# Patient Record
Sex: FEMALE | Race: BLACK OR AFRICAN AMERICAN | NOT HISPANIC OR LATINO | Employment: FULL TIME | URBAN - METROPOLITAN AREA
[De-identification: names, ages, dates, MRNs, and addresses within clinical notes are randomized per-mention and may not be internally consistent; named-entity substitution may affect disease eponyms.]

---

## 2018-12-27 ENCOUNTER — HOSPITAL ENCOUNTER (EMERGENCY)
Facility: CLINIC | Age: 28
Discharge: HOME | End: 2018-12-28
Attending: EMERGENCY MEDICINE | Admitting: EMERGENCY MEDICINE

## 2018-12-27 DIAGNOSIS — R06.02 SHORTNESS OF BREATH: ICD-10-CM

## 2018-12-27 DIAGNOSIS — Z3A.29 29 WEEKS GESTATION OF PREGNANCY: ICD-10-CM

## 2018-12-27 LAB
ALBUMIN SERPL-MCNC: 2.9 G/DL (ref 3.4–5)
ALBUMIN UR-MCNC: NEGATIVE MG/DL
ALP SERPL-CCNC: 116 U/L (ref 40–150)
ALT SERPL W P-5'-P-CCNC: 14 U/L (ref 0–50)
ANION GAP SERPL CALCULATED.3IONS-SCNC: 9 MMOL/L (ref 3–14)
APPEARANCE UR: CLEAR
AST SERPL W P-5'-P-CCNC: 18 U/L (ref 0–45)
BASOPHILS # BLD AUTO: 0 10E9/L (ref 0–0.2)
BASOPHILS NFR BLD AUTO: 0.2 %
BILIRUB SERPL-MCNC: 0.2 MG/DL (ref 0.2–1.3)
BILIRUB UR QL STRIP: NEGATIVE
BUN SERPL-MCNC: 7 MG/DL (ref 7–30)
CALCIUM SERPL-MCNC: 9.2 MG/DL (ref 8.5–10.1)
CHLORIDE SERPL-SCNC: 106 MMOL/L (ref 94–109)
CO2 SERPL-SCNC: 24 MMOL/L (ref 20–32)
COLOR UR AUTO: NORMAL
CREAT SERPL-MCNC: 0.46 MG/DL (ref 0.52–1.04)
D DIMER PPP FEU-MCNC: 1.7 UG/ML FEU (ref 0–0.5)
DIFFERENTIAL METHOD BLD: ABNORMAL
EOSINOPHIL # BLD AUTO: 0.2 10E9/L (ref 0–0.7)
EOSINOPHIL NFR BLD AUTO: 2.4 %
ERYTHROCYTE [DISTWIDTH] IN BLOOD BY AUTOMATED COUNT: 12.5 % (ref 10–15)
GFR SERPL CREATININE-BSD FRML MDRD: >90 ML/MIN/{1.73_M2}
GLUCOSE SERPL-MCNC: 84 MG/DL (ref 70–99)
GLUCOSE UR STRIP-MCNC: NEGATIVE MG/DL
HCT VFR BLD AUTO: 31.1 % (ref 35–47)
HGB BLD-MCNC: 10.3 G/DL (ref 11.7–15.7)
HGB UR QL STRIP: NEGATIVE
IMM GRANULOCYTES # BLD: 0 10E9/L (ref 0–0.4)
IMM GRANULOCYTES NFR BLD: 0.3 %
KETONES UR STRIP-MCNC: NEGATIVE MG/DL
LEUKOCYTE ESTERASE UR QL STRIP: NEGATIVE
LYMPHOCYTES # BLD AUTO: 2.1 10E9/L (ref 0.8–5.3)
LYMPHOCYTES NFR BLD AUTO: 31 %
MCH RBC QN AUTO: 30.4 PG (ref 26.5–33)
MCHC RBC AUTO-ENTMCNC: 33.1 G/DL (ref 31.5–36.5)
MCV RBC AUTO: 92 FL (ref 78–100)
MONOCYTES # BLD AUTO: 0.7 10E9/L (ref 0–1.3)
MONOCYTES NFR BLD AUTO: 10.4 %
NEUTROPHILS # BLD AUTO: 3.7 10E9/L (ref 1.6–8.3)
NEUTROPHILS NFR BLD AUTO: 55.7 %
NITRATE UR QL: NEGATIVE
NRBC # BLD AUTO: 0 10*3/UL
NRBC BLD AUTO-RTO: 0 /100
PH UR STRIP: 6.5 PH (ref 5–7)
PLATELET # BLD AUTO: 267 10E9/L (ref 150–450)
POTASSIUM SERPL-SCNC: 3.6 MMOL/L (ref 3.4–5.3)
PROT SERPL-MCNC: 6.9 G/DL (ref 6.8–8.8)
RBC # BLD AUTO: 3.39 10E12/L (ref 3.8–5.2)
SODIUM SERPL-SCNC: 139 MMOL/L (ref 133–144)
SOURCE: NORMAL
SP GR UR STRIP: 1 (ref 1–1.03)
UROBILINOGEN UR STRIP-MCNC: NORMAL MG/DL (ref 0–2)
WBC # BLD AUTO: 6.7 10E9/L (ref 4–11)

## 2018-12-27 PROCEDURE — 96360 HYDRATION IV INFUSION INIT: CPT

## 2018-12-27 PROCEDURE — 93005 ELECTROCARDIOGRAM TRACING: CPT

## 2018-12-27 PROCEDURE — 85379 FIBRIN DEGRADATION QUANT: CPT | Performed by: EMERGENCY MEDICINE

## 2018-12-27 PROCEDURE — 80053 COMPREHEN METABOLIC PANEL: CPT | Performed by: EMERGENCY MEDICINE

## 2018-12-27 PROCEDURE — 85025 COMPLETE CBC W/AUTO DIFF WBC: CPT | Performed by: EMERGENCY MEDICINE

## 2018-12-27 PROCEDURE — 99285 EMERGENCY DEPT VISIT HI MDM: CPT | Mod: 25

## 2018-12-27 PROCEDURE — 81003 URINALYSIS AUTO W/O SCOPE: CPT | Performed by: EMERGENCY MEDICINE

## 2018-12-27 PROCEDURE — 99285 EMERGENCY DEPT VISIT HI MDM: CPT | Mod: Z6 | Performed by: EMERGENCY MEDICINE

## 2018-12-27 NOTE — ED AVS SNAPSHOT
[facility], Emergency Department  [address]  [facility] [address]  Phone:  [phone]  Fax:  [phone]                                    Jenae Renee   MRN: [id number]    Department:  [facility], Emergency Department   Date of Visit:  12/27/2018           After Visit Summary Signature Page    I have received my discharge instructions, and my questions have been answered. I have discussed any challenges I see with this plan with the nurse or doctor.    ..........................................................................................................................................  Patient/Patient Representative Signature      ..........................................................................................................................................  Patient Representative Print Name and Relationship to Patient    ..................................................               ................................................  Date                                   Time    ..........................................................................................................................................  Reviewed by Signature/Title    ...................................................              ..............................................  Date                                               Time          22EPIC Rev 08/18

## 2018-12-28 ENCOUNTER — APPOINTMENT (OUTPATIENT)
Dept: CT IMAGING | Facility: CLINIC | Age: 28
End: 2018-12-28
Attending: EMERGENCY MEDICINE

## 2018-12-28 VITALS
BODY MASS INDEX: 26.73 KG/M2 | TEMPERATURE: 96.5 F | RESPIRATION RATE: 18 BRPM | OXYGEN SATURATION: 100 % | SYSTOLIC BLOOD PRESSURE: 110 MMHG | WEIGHT: 181 LBS | DIASTOLIC BLOOD PRESSURE: 65 MMHG | HEART RATE: 76 BPM

## 2018-12-28 LAB — INTERPRETATION ECG - MUSE: NORMAL

## 2018-12-28 PROCEDURE — 71260 CT THORAX DX C+: CPT

## 2018-12-28 PROCEDURE — 25000125 ZZHC RX 250: Performed by: EMERGENCY MEDICINE

## 2018-12-28 PROCEDURE — 25000128 H RX IP 250 OP 636: Performed by: EMERGENCY MEDICINE

## 2018-12-28 RX ORDER — IOPAMIDOL 755 MG/ML
100 INJECTION, SOLUTION INTRAVASCULAR ONCE
Status: COMPLETED | OUTPATIENT
Start: 2018-12-28 | End: 2018-12-28

## 2018-12-28 RX ADMIN — IOPAMIDOL 64 ML: 755 INJECTION, SOLUTION INTRAVENOUS at 01:03

## 2018-12-28 RX ADMIN — SODIUM CHLORIDE 1000 ML: 9 INJECTION, SOLUTION INTRAVENOUS at 00:24

## 2018-12-28 RX ADMIN — SODIUM CHLORIDE 84 ML: 9 INJECTION, SOLUTION INTRAVENOUS at 01:02

## 2018-12-28 NOTE — ED PROVIDER NOTES
[facility] [place] ([facility])     2018    History     Chief Complaint   Patient presents with     Shortness of Breath     reports was a passenger in a car and started having intermittent shortness of breath 30 minutes ago, 28 weeks pregnant     HPI  Jenae Renee is a 28 year old female  who is approximately 29w1d  pregnant (reported LMP of 18)  The patient states that over the last week, she has been having some intermittent episodes of shortness of breath associated with chest pressure anteriorly.  Patient states these episodes have been lasting approximately 20 minutes and then resolve on their own.  Patient states she did not think much of it because she thought it was just part of being pregnant.  The patient states she has had no vaginal bleeding, no change in fetal activity, and no significant lower extremity swelling, but states that she recently traveled via airplane to [facility].  Patient states that she went to [facility], 2 months ago, and just returned in the last week on a long flight.  The patient denies any fevers but states that she has developed a cough over the past few days as well.  Patient denies any shortness of breath at rest.    This part of the medical record was transcribed by Holland Otoole Scribe, from a dictation done by Kg Dow MD.     PAST MEDICAL HISTORY  Past Medical History:   Diagnosis Date     Ulcer, gastric, acute     Duodenal ulcer.     PAST SURGICAL HISTORY  History reviewed. No pertinent surgical history.     FAMILY HISTORY  History reviewed. No pertinent family history.     SOCIAL HISTORY  Social History     Tobacco Use     Smoking status: Never Smoker     Smokeless tobacco: Never Used   Substance Use Topics     Alcohol use: No     MEDICATIONS    Dose / Directions   PRENATAL VITAMIN PO      Dose:  1 tablet  Take 1 tablet by mouth daily  Refills:  0              ALLERGIES  Allergies   Allergen Reactions     Nsaids  Other (See Comments)     States she had a duodenal ulcer and her MD told her never to take NSAIDS.         I have reviewed the Medications, Allergies, Past Medical and Surgical History, and Social History in the Epic system.    Review of Systems   All other systems reviewed and are negative.      Physical Exam   BP: 111/67  Pulse: 74  Temp: 96  F (35.6  C)  Resp: 16  Weight: 82.1 kg (181 lb)  SpO2: 99 %      Physical Exam   Constitutional: She is oriented to person, place, and time. She appears well-nourished.   Alert and conversant   HENT:   Head: Atraumatic.   Eyes: EOM are normal. Pupils are equal, round, and reactive to light.   Neck: Neck supple. No JVD present.   Cardiovascular: Regular rhythm.   No murmur heard.  Pulmonary/Chest: Breath sounds normal. She has no wheezes. She has no rales.   Abdominal: Soft.   Fundal height consistent with 30 weeks    Fetal heart tones 150 by nursing personnel   Musculoskeletal: She exhibits no tenderness or deformity.   Trace edema bilaterally   Neurological: She is alert and oriented to person, place, and time.   Grossly intact and symmetric    No clonus   Skin: Skin is warm.   Psychiatric: She has a normal mood and affect.   Nursing note and vitals reviewed.      ED Course        Procedures          Patient's EKG revealed a normal sinus rhythm at a rate of 70 with a NY interval 0.158 and a QRS duration 0.086.  The patient had a normal axis with no acute ST or T wave changes significant for ischemia.    Results for orders placed or performed during the hospital encounter of 12/27/18   CBC with platelets differential   Result Value Ref Range    WBC 6.7 4.0 - 11.0 10e9/L    RBC Count 3.39 (L) 3.8 - 5.2 10e12/L    Hemoglobin 10.3 (L) 11.7 - 15.7 g/dL    Hematocrit 31.1 (L) 35.0 - 47.0 %    MCV 92 78 - 100 fl    MCH 30.4 26.5 - 33.0 pg    MCHC 33.1 31.5 - 36.5 g/dL    RDW 12.5 10.0 - 15.0 %    Platelet Count 267 150 - 450 10e9/L    Diff Method Automated Method     % Neutrophils  55.7 %    % Lymphocytes 31.0 %    % Monocytes 10.4 %    % Eosinophils 2.4 %    % Basophils 0.2 %    % Immature Granulocytes 0.3 %    Nucleated RBCs 0 0 /100    Absolute Neutrophil 3.7 1.6 - 8.3 10e9/L    Absolute Lymphocytes 2.1 0.8 - 5.3 10e9/L    Absolute Monocytes 0.7 0.0 - 1.3 10e9/L    Absolute Eosinophils 0.2 0.0 - 0.7 10e9/L    Absolute Basophils 0.0 0.0 - 0.2 10e9/L    Abs Immature Granulocytes 0.0 0 - 0.4 10e9/L    Absolute Nucleated RBC 0.0    Comprehensive metabolic panel   Result Value Ref Range    Sodium 139 133 - 144 mmol/L    Potassium 3.6 3.4 - 5.3 mmol/L    Chloride 106 94 - 109 mmol/L    Carbon Dioxide 24 20 - 32 mmol/L    Anion Gap 9 3 - 14 mmol/L    Glucose 84 70 - 99 mg/dL    Urea Nitrogen 7 7 - 30 mg/dL    Creatinine 0.46 (L) 0.52 - 1.04 mg/dL    GFR Estimate >90 >60 mL/min/[1.73_m2]    GFR Estimate If Black >90 >60 mL/min/[1.73_m2]    Calcium 9.2 8.5 - 10.1 mg/dL    Bilirubin Total 0.2 0.2 - 1.3 mg/dL    Albumin 2.9 (L) 3.4 - 5.0 g/dL    Protein Total 6.9 6.8 - 8.8 g/dL    Alkaline Phosphatase 116 40 - 150 U/L    ALT 14 0 - 50 U/L    AST 18 0 - 45 U/L   D dimer quantitative   Result Value Ref Range    D Dimer 1.7 (H) 0.0 - 0.50 ug/ml FEU   UA reflex to Microscopic and Culture   Result Value Ref Range    Color Urine Straw     Appearance Urine Clear     Glucose Urine Negative NEG^Negative mg/dL    Bilirubin Urine Negative NEG^Negative    Ketones Urine Negative NEG^Negative mg/dL    Specific Gravity Urine 1.004 1.003 - 1.035    Blood Urine Negative NEG^Negative    pH Urine 6.5 5.0 - 7.0 pH    Protein Albumin Urine Negative NEG^Negative mg/dL    Urobilinogen mg/dL Normal 0.0 - 2.0 mg/dL    Nitrite Urine Negative NEG^Negative    Leukocyte Esterase Urine Negative NEG^Negative    Source Midstream Urine        Labs Ordered and Resulted from Time of ED Arrival Up to the Time of Departure from the ED   CBC WITH PLATELETS DIFFERENTIAL - Abnormal; Notable for the following components:       Result Value     RBC Count 3.39 (*)     Hemoglobin 10.3 (*)     Hematocrit 31.1 (*)     All other components within normal limits   COMPREHENSIVE METABOLIC PANEL - Abnormal; Notable for the following components:    Creatinine 0.46 (*)     Albumin 2.9 (*)     All other components within normal limits   D DIMER QUANTITATIVE - Abnormal; Notable for the following components:    D Dimer 1.7 (*)     All other components within normal limits   UA MACROSCOPIC WITH REFLEX TO MICRO AND CULTURE   PULSE OXIMETRY NURSING   CARDIAC CONTINUOUS MONITORING   PERIPHERAL IV CATHETER   FETAL HEART RATE            Assessments & Plan (with Medical Decision Making)     I have reviewed the nursing notes.    While a positive d-dimer in a pregnant female is not necessarily indicative of pulmonary embolus, the d-dimer was not negative.  Combined with the patient's risk factors of pregnancy and a long with flight recently, patient will undergo chest CT pulmonary angiogram.  The risk benefit analysis was discussed with the patient.  Should this be negative the most likely etiology is esophageal reflux and the patient will be treated for this.  The case will be signed out one my partners will check the results of the CT and if negative the patient will be sent home with the instructions below.  If the chest CT is positive for any significant pathology, patient's disposition/treatment will be changed appropriately.    I have reviewed the findings, diagnosis, plan and need for follow up with the patient.       Medication List      Started    ranitidine 150 MG tablet  Commonly known as:  ZANTAC  150 mg, Oral, 2 TIMES DAILY            Final diagnoses:   Shortness of breath - possible GERD     Start Zantac and try to sleep with your head elevated 30 degrees.    Continue your prenatal vitamins    Eat smaller but more frequent meals.    Please make an appointment to follow up with Your Primary Care Provider or our [facility] (phone:  [phone]) in one week for recheck.    Routine discharge instructions were given for this diagnosis.    Kg Dow MD    12/27/2018   [facility], [place], EMERGENCY DEPARTMENT     Kg Dow MD  12/28/18 0108

## 2018-12-28 NOTE — DISCHARGE INSTRUCTIONS
Start Zantac and try to sleep with your head elevated 30 degrees.    Continue your prenatal vitamins    Eat smaller but more frequent meals.    Please make an appointment to follow up with Your Primary Care Provider or our [facility] (phone: [phone]) in one week for recheck.

## 2020-08-04 ENCOUNTER — HOSPITAL ENCOUNTER (EMERGENCY)
Facility: CLINIC | Age: 30
Discharge: HOME | End: 2020-08-05
Attending: EMERGENCY MEDICINE | Admitting: EMERGENCY MEDICINE
Payer: COMMERCIAL

## 2020-08-04 DIAGNOSIS — O21.9 NAUSEA AND VOMITING IN PREGNANCY: ICD-10-CM

## 2020-08-04 LAB
BASOPHILS # BLD AUTO: 0 10E9/L (ref 0–0.2)
BASOPHILS NFR BLD AUTO: 0.3 %
DIFFERENTIAL METHOD BLD: NORMAL
EOSINOPHIL # BLD AUTO: 0.1 10E9/L (ref 0–0.7)
EOSINOPHIL NFR BLD AUTO: 1.7 %
ERYTHROCYTE [DISTWIDTH] IN BLOOD BY AUTOMATED COUNT: 12.2 % (ref 10–15)
HCT VFR BLD AUTO: 35.8 % (ref 35–47)
HGB BLD-MCNC: 11.8 G/DL (ref 11.7–15.7)
IMM GRANULOCYTES # BLD: 0 10E9/L (ref 0–0.4)
IMM GRANULOCYTES NFR BLD: 0.3 %
LYMPHOCYTES # BLD AUTO: 2.8 10E9/L (ref 0.8–5.3)
LYMPHOCYTES NFR BLD AUTO: 41.8 %
MCH RBC QN AUTO: 30.5 PG (ref 26.5–33)
MCHC RBC AUTO-ENTMCNC: 33 G/DL (ref 31.5–36.5)
MCV RBC AUTO: 93 FL (ref 78–100)
MONOCYTES # BLD AUTO: 0.7 10E9/L (ref 0–1.3)
MONOCYTES NFR BLD AUTO: 10.4 %
NEUTROPHILS # BLD AUTO: 3 10E9/L (ref 1.6–8.3)
NEUTROPHILS NFR BLD AUTO: 45.5 %
NRBC # BLD AUTO: 0 10*3/UL
NRBC BLD AUTO-RTO: 0 /100
PLATELET # BLD AUTO: 271 10E9/L (ref 150–450)
RBC # BLD AUTO: 3.87 10E12/L (ref 3.8–5.2)
WBC # BLD AUTO: 6.6 10E9/L (ref 4–11)

## 2020-08-04 PROCEDURE — 99284 EMERGENCY DEPT VISIT MOD MDM: CPT | Mod: 25 | Performed by: EMERGENCY MEDICINE

## 2020-08-04 PROCEDURE — 25800030 ZZH RX IP 258 OP 636

## 2020-08-04 PROCEDURE — 81025 URINE PREGNANCY TEST: CPT | Performed by: EMERGENCY MEDICINE

## 2020-08-04 PROCEDURE — 80048 BASIC METABOLIC PNL TOTAL CA: CPT | Performed by: FAMILY MEDICINE

## 2020-08-04 PROCEDURE — 96374 THER/PROPH/DIAG INJ IV PUSH: CPT | Performed by: EMERGENCY MEDICINE

## 2020-08-04 PROCEDURE — 96361 HYDRATE IV INFUSION ADD-ON: CPT | Performed by: EMERGENCY MEDICINE

## 2020-08-04 PROCEDURE — 99284 EMERGENCY DEPT VISIT MOD MDM: CPT | Mod: Z6 | Performed by: EMERGENCY MEDICINE

## 2020-08-04 PROCEDURE — 25000128 H RX IP 250 OP 636: Performed by: FAMILY MEDICINE

## 2020-08-04 PROCEDURE — 83735 ASSAY OF MAGNESIUM: CPT | Performed by: FAMILY MEDICINE

## 2020-08-04 PROCEDURE — 84702 CHORIONIC GONADOTROPIN TEST: CPT | Performed by: FAMILY MEDICINE

## 2020-08-04 PROCEDURE — 85025 COMPLETE CBC W/AUTO DIFF WBC: CPT | Performed by: FAMILY MEDICINE

## 2020-08-04 RX ORDER — SODIUM CHLORIDE 9 MG/ML
INJECTION, SOLUTION INTRAVENOUS
Status: COMPLETED
Start: 2020-08-04 | End: 2020-08-05

## 2020-08-04 RX ORDER — ONDANSETRON 2 MG/ML
4 INJECTION INTRAMUSCULAR; INTRAVENOUS
Status: COMPLETED | OUTPATIENT
Start: 2020-08-04 | End: 2020-08-04

## 2020-08-04 RX ADMIN — SODIUM CHLORIDE 1000 ML: 9 INJECTION, SOLUTION INTRAVENOUS at 23:14

## 2020-08-04 RX ADMIN — Medication 1000 ML: at 23:14

## 2020-08-04 RX ADMIN — ONDANSETRON 4 MG: 2 INJECTION INTRAMUSCULAR; INTRAVENOUS at 23:14

## 2020-08-04 ASSESSMENT — ENCOUNTER SYMPTOMS
DYSURIA: 0
DIARRHEA: 0
NAUSEA: 1
FEVER: 0
ABDOMINAL PAIN: 1
VOMITING: 1
CHILLS: 0
SHORTNESS OF BREATH: 0
SORE THROAT: 0
FREQUENCY: 0
COUGH: 0

## 2020-08-04 NOTE — ED AVS SNAPSHOT
[facility], [place], Emergency Department  [address]  [facility] [address]  Phone:  [phone]  Fax:  [phone]                                    Jenae Renee   MRN: [id number]    Department:  [facility], Emergency Department   Date of Visit:  8/4/2020           After Visit Summary Signature Page    I have received my discharge instructions, and my questions have been answered. I have discussed any challenges I see with this plan with the nurse or doctor.    ..........................................................................................................................................  Patient/Patient Representative Signature      ..........................................................................................................................................  Patient Representative Print Name and Relationship to Patient    ..................................................               ................................................  Date                                   Time    ..........................................................................................................................................  Reviewed by Signature/Title    ...................................................              ..............................................  Date                                               Time          22EPIC Rev 08/18

## 2020-08-05 ENCOUNTER — TELEPHONE (OUTPATIENT)
Dept: OBGYN | Facility: CLINIC | Age: 30
End: 2020-08-05

## 2020-08-05 VITALS
BODY MASS INDEX: 29.11 KG/M2 | HEART RATE: 80 BPM | TEMPERATURE: 98.5 F | RESPIRATION RATE: 16 BRPM | OXYGEN SATURATION: 100 % | SYSTOLIC BLOOD PRESSURE: 114 MMHG | WEIGHT: 197.1 LBS | DIASTOLIC BLOOD PRESSURE: 61 MMHG

## 2020-08-05 DIAGNOSIS — O20.0 THREATENED ABORTION: Primary | ICD-10-CM

## 2020-08-05 LAB
ALBUMIN UR-MCNC: 30 MG/DL
ANION GAP SERPL CALCULATED.3IONS-SCNC: 4 MMOL/L (ref 3–14)
APPEARANCE UR: CLEAR
B-HCG SERPL-ACNC: ABNORMAL IU/L (ref 0–5)
BILIRUB UR QL STRIP: NEGATIVE
BUN SERPL-MCNC: 9 MG/DL (ref 7–30)
CALCIUM SERPL-MCNC: 9.3 MG/DL (ref 8.5–10.1)
CHLORIDE SERPL-SCNC: 110 MMOL/L (ref 94–109)
CO2 SERPL-SCNC: 24 MMOL/L (ref 20–32)
COLOR UR AUTO: YELLOW
CREAT SERPL-MCNC: 0.48 MG/DL (ref 0.52–1.04)
GFR SERPL CREATININE-BSD FRML MDRD: >90 ML/MIN/{1.73_M2}
GLUCOSE SERPL-MCNC: 82 MG/DL (ref 70–99)
GLUCOSE UR STRIP-MCNC: NEGATIVE MG/DL
HCG UR QL: POSITIVE
HGB UR QL STRIP: NEGATIVE
INTERNAL QC OK POCT: YES
KETONES UR STRIP-MCNC: 5 MG/DL
LEUKOCYTE ESTERASE UR QL STRIP: NEGATIVE
MAGNESIUM SERPL-MCNC: 2.4 MG/DL (ref 1.6–2.3)
MUCOUS THREADS #/AREA URNS LPF: PRESENT /LPF
NITRATE UR QL: NEGATIVE
PH UR STRIP: 6 PH (ref 5–7)
POTASSIUM SERPL-SCNC: 4 MMOL/L (ref 3.4–5.3)
RBC #/AREA URNS AUTO: 0 /HPF (ref 0–2)
SODIUM SERPL-SCNC: 138 MMOL/L (ref 133–144)
SOURCE: ABNORMAL
SP GR UR STRIP: 1.03 (ref 1–1.03)
SQUAMOUS #/AREA URNS AUTO: 2 /HPF (ref 0–1)
UROBILINOGEN UR STRIP-MCNC: 2 MG/DL (ref 0–2)
WBC #/AREA URNS AUTO: 3 /HPF (ref 0–5)

## 2020-08-05 PROCEDURE — 81001 URINALYSIS AUTO W/SCOPE: CPT | Performed by: EMERGENCY MEDICINE

## 2020-08-05 RX ORDER — PYRIDOXINE HCL (VITAMIN B6) 25 MG
25 TABLET ORAL EVERY 8 HOURS PRN
Qty: 30 TABLET | Refills: 0 | Status: SHIPPED | OUTPATIENT
Start: 2020-08-05 | End: 2020-09-24

## 2020-08-05 NOTE — DISCHARGE INSTRUCTIONS
You have been seen in the emergency department today for nausea and vomiting in pregnancy.  This is very common in early pregnancy.  We have given you prescriptions for pyridoxine and doxylamine which are the first-line treatment for nausea and vomiting in pregnancy.  You are not significantly dehydrated according to your lab test.  We have offered to do a pelvic exam which you have declined.  We have offered to do an ultrasound which you have declined.    At this point we advised that you call your clinic tomorrow and make an appointment to be evaluated through your clinic.  Certainly if you are noticing abdominal pain, increased bleeding, or other new concerns, come back to the emergency department at which point we can reevaluate you.

## 2020-08-05 NOTE — ED NOTES
Writer attempted to take patient to ultrasound and writer informed patient that it would be a male technician doing the ultrasound. Patient then refused due to ultrasound technician being male. Patient was informed that there were no other female technicians and patient verbalized understanding that she would be getting discharged if refusal of ultrasound.

## 2020-08-05 NOTE — TELEPHONE ENCOUNTER
Received call from Jenae who was trying to make appointment to establish prenatal care with us but mentioned she is having pink discharge so she was forwarded to the nurse.    Jenae reports she had a miscarriage on 6/18 that resolved with bleeding. Per [facility], she had bhcg 6/26 that was <1. She did not have a menses after that.  She went to ED yesterday for nausea/vomiting in pregnancy but declined ultrasound due to not having a female ultrasound tech available.  Denies pain.  Her bchg was 54,000+.  Rh+.    Discussed with Dr. Amezquita who advised ultrasound now. No need for second bhcg. Will develop plan of care when know what ultrasound shows (normal vs miscarriage vs molar).  Gave this information to Jenae who agreed with plan and was scheduled for tomorrow morning.    If ultrasound normal, will need to be scheduled for regular prenatal care.

## 2020-08-05 NOTE — ED PROVIDER NOTES
"    [facility] EMERGENCY DEPARTMENT ([facility])     2020    History     Chief Complaint   Patient presents with     Nausea & Vomiting     LMP , 2 weeks ago found out she is pregnant, for the past week she has been unable to keep anything down     Vaginal Bleeding     has noticed \" pink watery discharge, no blood for the past day\"     The history is provided by the patient and medical records.     Jenae Renee is a 29 year old female who presents to the Emergency Department today complaining of nausea and vomiting.  Patient reports that she had a positive urine pregnancy test approximately 2 weeks ago.  She is estimating that she is approximately 7 weeks pregnant.  Her last menstrual period was on , however that period was a miscarriage of a prior pregnancy.  She had not had a period since that time.  For the past 2 weeks she has had significant nausea and vomiting and for the past week states she has been unable to keep anything down.  She has vomited 2 times today.  She has constant nausea all day long.  She has noticed decrease in urination as well.  She has some upper abdominal pain which feels muscular to her which she believes is due to vomiting.  She has no lower abdominal pain.  She has no cramping.  She noticed some very light pink discharge earlier today which is now gone.  She denies dysuria or urinary frequency.  No fever or chills.  No sore throat or nasal congestion.  No cough or shortness of breath.  No chest pain.  No diarrhea, she does have problems with constipation.  Patient is .    This part of the medical record was transcribed by Holland Leon Scribariel, from a dictation done by Genia Emery MD.     PAST MEDICAL HISTORY:   Past Medical History:   Diagnosis Date     Ulcer, gastric, acute     Duodenal ulcer.       PAST SURGICAL HISTORY: No past surgical history on file.    Past medical history, past surgical history, medications, and allergies were " reviewed with the patient. Additional pertinent items: None    FAMILY HISTORY: No family history on file.    SOCIAL HISTORY:   Social History     Tobacco Use     Smoking status: Never Smoker     Smokeless tobacco: Never Used   Substance Use Topics     Alcohol use: No     Social history was reviewed with the patient. Additional pertinent items: None      Patient's Medications   New Prescriptions    No medications on file   Previous Medications    FOLIC ACID PO    Take 400 mcg by mouth daily    PRENATAL VIT-FE FUMARATE-FA (PRENATAL VITAMIN PO)    Take 1 tablet by mouth daily   Modified Medications    No medications on file   Discontinued Medications    No medications on file          Allergies   Allergen Reactions     Nsaids Other (See Comments)     States she had a duodenal ulcer and her MD told her never to take NSAIDS.        Review of Systems   Constitutional: Negative for chills and fever.   HENT: Negative for congestion and sore throat.    Respiratory: Negative for cough and shortness of breath.    Cardiovascular: Negative for chest pain.   Gastrointestinal: Positive for abdominal pain (upper), nausea and vomiting. Negative for diarrhea.   Genitourinary: Positive for decreased urine volume and vaginal discharge (resolved). Negative for dysuria and frequency.   All other systems reviewed and are negative.    A complete review of systems was performed with pertinent positives and negatives noted in the HPI, and all other systems negative.    Physical Exam   BP: 116/71  Pulse: 69  Heart Rate: 88  Temp: 98  F (36.7  C)  Resp: 16  Weight: 89.4 kg (197 lb 1.6 oz)  SpO2: 100 %      Physical Exam  Vitals signs and nursing note reviewed.   Constitutional:       General: She is not in acute distress.     Appearance: She is well-developed. She is not diaphoretic.      Comments: Alert, cooperative, NAD   HENT:      Head: Normocephalic and atraumatic.   Eyes:      Conjunctiva/sclera: Conjunctivae normal.      Pupils: Pupils  are equal, round, and reactive to light.   Cardiovascular:      Rate and Rhythm: Normal rate and regular rhythm.      Heart sounds: Normal heart sounds.   Pulmonary:      Effort: Pulmonary effort is normal. No respiratory distress.      Breath sounds: Normal breath sounds. No wheezing or rales.   Abdominal:      General: Bowel sounds are normal. There is no distension.      Palpations: Abdomen is soft.      Tenderness: There is no abdominal tenderness.      Comments: Very slight epigastric TTP without guarding or rebound   Genitourinary:     Comments: Patient declined pelvic exam  Skin:     General: Skin is warm and dry.   Neurological:      Mental Status: She is alert and oriented to person, place, and time.          Procedures                           Results for orders placed or performed during the hospital encounter of 08/04/20   HCG quantitative pregnancy     Status: Abnormal   Result Value Ref Range    HCG Quantitative Serum 54,719 (H) 0 - 5 IU/L   UA with Microscopic reflex to Culture     Status: Abnormal    Specimen: Urine clean catch; Midstream Urine   Result Value Ref Range    Color Urine Yellow     Appearance Urine Clear     Glucose Urine Negative NEG^Negative mg/dL    Bilirubin Urine Negative NEG^Negative    Ketones Urine 5 (A) NEG^Negative mg/dL    Specific Gravity Urine 1.030 1.003 - 1.035    Blood Urine Negative NEG^Negative    pH Urine 6.0 5.0 - 7.0 pH    Protein Albumin Urine 30 (A) NEG^Negative mg/dL    Urobilinogen mg/dL 2.0 0.0 - 2.0 mg/dL    Nitrite Urine Negative NEG^Negative    Leukocyte Esterase Urine Negative NEG^Negative    Source Midstream Urine     WBC Urine 3 0 - 5 /HPF    RBC Urine 0 0 - 2 /HPF    Squamous Epithelial /HPF Urine 2 (H) 0 - 1 /HPF    Mucous Urine Present (A) NEG^Negative /LPF   Basic metabolic panel     Status: Abnormal   Result Value Ref Range    Sodium 138 133 - 144 mmol/L    Potassium 4.0 3.4 - 5.3 mmol/L    Chloride 110 (H) 94 - 109 mmol/L    Carbon Dioxide 24 20 -  32 mmol/L    Anion Gap 4 3 - 14 mmol/L    Glucose 82 70 - 99 mg/dL    Urea Nitrogen 9 7 - 30 mg/dL    Creatinine 0.48 (L) 0.52 - 1.04 mg/dL    GFR Estimate >90 >60 mL/min/[1.73_m2]    GFR Estimate If Black >90 >60 mL/min/[1.73_m2]    Calcium 9.3 8.5 - 10.1 mg/dL   Magnesium     Status: Abnormal   Result Value Ref Range    Magnesium 2.4 (H) 1.6 - 2.3 mg/dL   CBC with platelets differential     Status: None   Result Value Ref Range    WBC 6.6 4.0 - 11.0 10e9/L    RBC Count 3.87 3.8 - 5.2 10e12/L    Hemoglobin 11.8 11.7 - 15.7 g/dL    Hematocrit 35.8 35.0 - 47.0 %    MCV 93 78 - 100 fl    MCH 30.5 26.5 - 33.0 pg    MCHC 33.0 31.5 - 36.5 g/dL    RDW 12.2 10.0 - 15.0 %    Platelet Count 271 150 - 450 10e9/L    Diff Method Automated Method     % Neutrophils 45.5 %    % Lymphocytes 41.8 %    % Monocytes 10.4 %    % Eosinophils 1.7 %    % Basophils 0.3 %    % Immature Granulocytes 0.3 %    Nucleated RBCs 0 0 /100    Absolute Neutrophil 3.0 1.6 - 8.3 10e9/L    Absolute Lymphocytes 2.8 0.8 - 5.3 10e9/L    Absolute Monocytes 0.7 0.0 - 1.3 10e9/L    Absolute Eosinophils 0.1 0.0 - 0.7 10e9/L    Absolute Basophils 0.0 0.0 - 0.2 10e9/L    Abs Immature Granulocytes 0.0 0 - 0.4 10e9/L    Absolute Nucleated RBC 0.0    hCG qual urine POCT     Status: Abnormal   Result Value Ref Range    HCG Qual Urine Positive neg    Internal QC OK Yes        Medications   dextrose 5% and 0.45% NaCl 1,000 mL with Infuvite Adult 10 mL, thiamine 100 mg, folic acid 1 mg infusion ( Intravenous Not Given 8/5/20 0016)   ondansetron (ZOFRAN) injection 4 mg (4 mg Intravenous Given 8/4/20 0091)   0.9% sodium chloride BOLUS (0 mLs Intravenous Stopped 8/5/20 0014)             Assessments & Plan (with Medical Decision Making)   Patient presents for the above above complaints.  On my evaluation she is alert, cooperative, no acute distress.  Vital signs are within normal limits.  Abdominal exam shows very minimal epigastric tenderness to palpation.    Likely she is  experiencing nausea and vomiting consistent with early pregnancy.  Rather doubt significant problems such as ectopic pregnancy at this point.  Metabolic derangement needs to be considered given the fact that she has been reporting she is not keeping anything down.  We did establish IV access and we did draw blood for laboratory analysis.  CBC is within normal limits, BMP is within normal limits, Magnesium 2.4.  UA is not suggestive of infection and UPT positive, beta-hCG is 54,719.  Patient was given IV fluids including normal saline here in the emergency department.  She was given a dose of Zofran for antiemetic purposes.  We did order a banana bag which the patient has declined at this point.  Upon reassessment she is feeling improved though she does reports that she started to notice some increasing pink discharge which had stopped and then started here again.  She denies any elgin bleeding and denies any cramping.  I offered to do a pelvic exam which she declined.  I offered to do ultrasound to evaluate for ectopic pregnancy versus subchorionic hemorrhage or impending spontaneous AB.  She initially agreed and then ultimately declined because the ultrasound tech who is on-call tonight is male.  Patient will be discharged at this time and she can certainly follow-up with her clinic.  If she is noticing worsening symptoms she should come back to the emergency department.    This part of the medical record was transcribed by Kassy Jara, Medical Scribe, from a dictation done by Genia Emery MD.     I have reviewed the nursing notes.    I have reviewed the findings, diagnosis, plan and need for follow up with the patient.    New Prescriptions    No medications on file       Final diagnoses:   Nausea and vomiting in pregnancy       8/4/2020   [facility], [place], EMERGENCY DEPARTMENT     Genia Emery MD  08/05/20 0106

## 2020-08-06 ENCOUNTER — TELEPHONE (OUTPATIENT)
Dept: FAMILY MEDICINE | Facility: CLINIC | Age: 30
End: 2020-08-06

## 2020-08-06 ENCOUNTER — ANCILLARY PROCEDURE (OUTPATIENT)
Dept: ULTRASOUND IMAGING | Facility: CLINIC | Age: 30
End: 2020-08-06
Attending: OBSTETRICS & GYNECOLOGY
Payer: COMMERCIAL

## 2020-08-06 DIAGNOSIS — O20.0 THREATENED ABORTION: ICD-10-CM

## 2020-08-06 DIAGNOSIS — O21.9 NAUSEA AND VOMITING IN PREGNANCY: Primary | ICD-10-CM

## 2020-08-06 PROCEDURE — 76817 TRANSVAGINAL US OBSTETRIC: CPT

## 2020-08-06 NOTE — TELEPHONE ENCOUNTER
Reason for call:  Patient reporting a symptom    Symptom or request: vomiting concerns     Duration (how long have symptoms been present): About a week and a half    Have you been treated for this before? No    Additional comments: Patient is wondering what she should do for her vomiting concerns and what she should take. She would like to speak to a nurse to discuss this. Patient went to the women's clinic at the [facility] for a pregnancy confirmation yesterday. I informed patient she would have to call OBGYN and she has already contacted them over at [place] and they told her to call our clinic to establish care. Patient states they just need to discuss her vomiting concerns and that will establish care at a later date. She is wondering what over the counter medications she can take to help the vomiting subside.    Phone Number patient can be reached at:  Cell number on file:    Telephone Information:   Mobile [phone]       Best Time:  As soon as possible     Can we leave a detailed message on this number:  YES    Call taken on 8/6/2020 at 4:33 PM by Christopher Browning

## 2020-08-06 NOTE — TELEPHONE ENCOUNTER
"She is scheduled for prenatal intake and a repeat US on 8/27/20.    Her US today indicates she is about 7 weeks' pregnant.    I see B6 and doxylamine on Epic med list.    I called patient, she says she has never seen anyone here, is transferring care from [facility].   Says she was in the ER 2 days ago and got IV fluids for vomiting.   Says she has tried the B6 and unisom for 3 days but still not keeping anything down.   States she last urinated \"a little bit\" about 6 hours ago, denies feeling dizzy, is feeling a bit weak but states she is not feeling as bad as she was when she was in ER.   She does not want to go to ER again as she has another baby to take care of and hopes she can get something else for her nausea.    She says the zofran given in the ER helped.    Pharmacy selected.   She has yet to establish care with primary care here, is scheduled for OB intake but assume that is RN?    Routed to provider who ordered the recent US to address possible Rx; also routed to RD OB triage in case this needs to go to an alternate provider who may be available aarti.    Izabel Rubio, FILOMENA  [facility]          "

## 2020-08-07 RX ORDER — ONDANSETRON 4 MG/1
4 TABLET, FILM COATED ORAL EVERY 8 HOURS PRN
Qty: 30 TABLET | Refills: 1 | Status: SHIPPED | OUTPATIENT
Start: 2020-08-07 | End: 2020-09-24

## 2020-08-07 NOTE — TELEPHONE ENCOUNTER
Spoke with Jenae and informed her will send zofran to pharmacy - she requested pill and not dissolvable.  Advised that when she is feeling better to hydrate. If feeling dizzy or light headed, go to ED.  Sometime popscicles/ice chips go down easier and can try that for added hydration.  Informed that since ultrasound was normal, no need for repeat and upcoming ultrasound will be cancelled.  She indicated understanding to all and agreed with plan.

## 2020-08-27 ENCOUNTER — OFFICE VISIT (OUTPATIENT)
Dept: OBGYN | Facility: CLINIC | Age: 30
End: 2020-08-27
Attending: MIDWIFE
Payer: COMMERCIAL

## 2020-08-27 VITALS
HEIGHT: 69 IN | BODY MASS INDEX: 27.27 KG/M2 | HEART RATE: 73 BPM | SYSTOLIC BLOOD PRESSURE: 120 MMHG | DIASTOLIC BLOOD PRESSURE: 82 MMHG | WEIGHT: 184.1 LBS

## 2020-08-27 DIAGNOSIS — Z34.80 SUPERVISION OF OTHER NORMAL PREGNANCY: Primary | ICD-10-CM

## 2020-08-27 DIAGNOSIS — R11.2 NAUSEA AND VOMITING, INTRACTABILITY OF VOMITING NOT SPECIFIED, UNSPECIFIED VOMITING TYPE: ICD-10-CM

## 2020-08-27 LAB
ABO + RH BLD: NORMAL
ABO + RH BLD: NORMAL
ALBUMIN SERPL-MCNC: 3.6 G/DL (ref 3.4–5)
ALP SERPL-CCNC: 51 U/L (ref 40–150)
ALT SERPL W P-5'-P-CCNC: 46 U/L (ref 0–50)
ANION GAP SERPL CALCULATED.3IONS-SCNC: 8 MMOL/L (ref 3–14)
AST SERPL W P-5'-P-CCNC: 28 U/L (ref 0–45)
BASOPHILS # BLD AUTO: 0 10E9/L (ref 0–0.2)
BASOPHILS NFR BLD AUTO: 0.4 %
BILIRUB SERPL-MCNC: 0.5 MG/DL (ref 0.2–1.3)
BLD GP AB SCN SERPL QL: NORMAL
BLOOD BANK CMNT PATIENT-IMP: NORMAL
BUN SERPL-MCNC: 8 MG/DL (ref 7–30)
CALCIUM SERPL-MCNC: 9.8 MG/DL (ref 8.5–10.1)
CHLORIDE SERPL-SCNC: 108 MMOL/L (ref 94–109)
CO2 SERPL-SCNC: 23 MMOL/L (ref 20–32)
CREAT SERPL-MCNC: 0.47 MG/DL (ref 0.52–1.04)
DIFFERENTIAL METHOD BLD: NORMAL
EOSINOPHIL # BLD AUTO: 0.1 10E9/L (ref 0–0.7)
EOSINOPHIL NFR BLD AUTO: 1.3 %
ERYTHROCYTE [DISTWIDTH] IN BLOOD BY AUTOMATED COUNT: 12.1 % (ref 10–15)
GFR SERPL CREATININE-BSD FRML MDRD: >90 ML/MIN/{1.73_M2}
GLUCOSE SERPL-MCNC: 87 MG/DL (ref 70–99)
HCT VFR BLD AUTO: 36.8 % (ref 35–47)
HGB BLD-MCNC: 12.4 G/DL (ref 11.7–15.7)
IMM GRANULOCYTES # BLD: 0 10E9/L (ref 0–0.4)
IMM GRANULOCYTES NFR BLD: 0.2 %
LYMPHOCYTES # BLD AUTO: 2 10E9/L (ref 0.8–5.3)
LYMPHOCYTES NFR BLD AUTO: 37.5 %
MCH RBC QN AUTO: 30.5 PG (ref 26.5–33)
MCHC RBC AUTO-ENTMCNC: 33.7 G/DL (ref 31.5–36.5)
MCV RBC AUTO: 90 FL (ref 78–100)
MONOCYTES # BLD AUTO: 0.6 10E9/L (ref 0–1.3)
MONOCYTES NFR BLD AUTO: 10.3 %
NEUTROPHILS # BLD AUTO: 2.7 10E9/L (ref 1.6–8.3)
NEUTROPHILS NFR BLD AUTO: 50.3 %
NRBC # BLD AUTO: 0 10*3/UL
NRBC BLD AUTO-RTO: 0 /100
PLATELET # BLD AUTO: 280 10E9/L (ref 150–450)
POTASSIUM SERPL-SCNC: 3.9 MMOL/L (ref 3.4–5.3)
PROT SERPL-MCNC: 7.9 G/DL (ref 6.8–8.8)
RBC # BLD AUTO: 4.07 10E12/L (ref 3.8–5.2)
SODIUM SERPL-SCNC: 139 MMOL/L (ref 133–144)
SPECIMEN EXP DATE BLD: NORMAL
T4 FREE SERPL-MCNC: 1.34 NG/DL (ref 0.76–1.46)
TSH SERPL DL<=0.005 MIU/L-ACNC: 0.19 MU/L (ref 0.4–4)
WBC # BLD AUTO: 5.3 10E9/L (ref 4–11)

## 2020-08-27 PROCEDURE — 36415 COLL VENOUS BLD VENIPUNCTURE: CPT | Performed by: MIDWIFE

## 2020-08-27 PROCEDURE — 86850 RBC ANTIBODY SCREEN: CPT | Performed by: MIDWIFE

## 2020-08-27 PROCEDURE — 84439 ASSAY OF FREE THYROXINE: CPT | Performed by: MIDWIFE

## 2020-08-27 PROCEDURE — 82306 VITAMIN D 25 HYDROXY: CPT | Performed by: MIDWIFE

## 2020-08-27 PROCEDURE — 80053 COMPREHEN METABOLIC PANEL: CPT | Performed by: MIDWIFE

## 2020-08-27 PROCEDURE — 86762 RUBELLA ANTIBODY: CPT | Performed by: MIDWIFE

## 2020-08-27 PROCEDURE — G0463 HOSPITAL OUTPT CLINIC VISIT: HCPCS

## 2020-08-27 PROCEDURE — 86780 TREPONEMA PALLIDUM: CPT | Performed by: MIDWIFE

## 2020-08-27 PROCEDURE — 87340 HEPATITIS B SURFACE AG IA: CPT | Performed by: MIDWIFE

## 2020-08-27 PROCEDURE — 84443 ASSAY THYROID STIM HORMONE: CPT | Performed by: MIDWIFE

## 2020-08-27 PROCEDURE — 87086 URINE CULTURE/COLONY COUNT: CPT | Performed by: MIDWIFE

## 2020-08-27 PROCEDURE — 86901 BLOOD TYPING SEROLOGIC RH(D): CPT | Performed by: MIDWIFE

## 2020-08-27 PROCEDURE — 85025 COMPLETE CBC W/AUTO DIFF WBC: CPT | Performed by: MIDWIFE

## 2020-08-27 PROCEDURE — 87389 HIV-1 AG W/HIV-1&-2 AB AG IA: CPT | Performed by: MIDWIFE

## 2020-08-27 PROCEDURE — 86900 BLOOD TYPING SEROLOGIC ABO: CPT | Performed by: MIDWIFE

## 2020-08-27 PROCEDURE — 86803 HEPATITIS C AB TEST: CPT | Performed by: MIDWIFE

## 2020-08-27 RX ORDER — DEXTROSE, SODIUM CHLORIDE, SODIUM LACTATE, POTASSIUM CHLORIDE, AND CALCIUM CHLORIDE 5; .6; .31; .03; .02 G/100ML; G/100ML; G/100ML; G/100ML; G/100ML
1000 INJECTION, SOLUTION INTRAVENOUS ONCE
Status: CANCELLED | OUTPATIENT
Start: 2020-08-27

## 2020-08-27 RX ORDER — METOCLOPRAMIDE 10 MG/1
10 TABLET ORAL
Qty: 30 TABLET | Refills: 1 | Status: SHIPPED | OUTPATIENT
Start: 2020-08-27 | End: 2020-09-24

## 2020-08-27 RX ORDER — ONDANSETRON 2 MG/ML
4 INJECTION INTRAMUSCULAR; INTRAVENOUS ONCE
Status: CANCELLED | OUTPATIENT
Start: 2020-08-27

## 2020-08-27 ASSESSMENT — MIFFLIN-ST. JEOR: SCORE: 1614.45

## 2020-08-27 NOTE — PATIENT INSTRUCTIONS
"Discharge Instructions for COVID-19 Patients  You have--or may have--COVID-19. Please follow the instructions listed below.   If you have a weakened immune system, discuss with your doctor any other actions you need to take.  How can I protect others?  If you have symptoms (fever, cough, body aches or trouble breathing):    Stay home and away from others (self-isolate) until:  ? At least 10 days have passed since your symptoms started. And   ? You've had no fever--and no medicine that reduces fever--for 1 full day (24 hours). And   ? Your other symptoms have resolved (gotten better).  If you don't show symptoms, but testing showed that you have COVID-19:    Stay home and away from others (self-isolate) until at least 10 days have passed since the date of your first positive COVID-19 test.  During this time    Stay in your own room, even for meals. Use your own bathroom if you can.    Stay away from others in your home. No hugging, kissing or shaking hands. No visitors.    Don't go to work, school or anywhere else.    Clean \"high touch\" surfaces often (doorknobs, counters, handles). Use household cleaning spray or wipes. You'll find a full list of  on the EPA website: www.epa.gov/pesticide-registration/list-n-disinfectants-use-against-sars-cov-2.    Cover your mouth and nose with a mask or other face covering to avoid spreading germs.    Wash your hands and face often. Use soap and water.    Caregivers in these groups are at risk for severe illness due to COVID-19:  ? People 65 years and older  ? People who live in a nursing home or long-term care facility  ? People with chronic disease (lung, heart, cancer, diabetes, kidney, liver, immunologic)  ? People who have a weakened immune system, including those who:    Are in cancer treatment    Take medicine that weakens the immune system, such as corticosteroids    Had a bone marrow or organ transplant    Have an immune deficiency    Have poorly controlled HIV or " AIDS    Are obese (body mass index of 40 or higher)    Smoke regularly    Caregivers should wear gloves while washing dishes, handling laundry and cleaning bedrooms and bathrooms.    Use caution when washing and drying laundry: Don't shake dirty laundry and use the warmest water setting that you can.    For more tips on managing your health at home, go to www.cdc.gov/coronavirus/2019-ncov/downloads/10Things.pdf.  How can I take care of myself at home?  1. Get lots of rest. Drink extra fluids (unless a doctor has told you not to).  2. Take Tylenol (acetaminophen) for fever or pain. If you have liver or kidney problems, ask your family doctor if it's okay to take Tylenol.     Adults can take either:  ? 650 mg (two 325 mg pills) every 4 to 6 hours, or   ? 1,000 mg (two 500 mg pills) every 8 hours as needed.  ? Note: Don't take more than 3,000 mg in one day. Acetaminophen is found in many medicines (both prescribed and over-the-counter medicines). Read all labels to be sure you don't take too much.   For children, check the Tylenol bottle for the right dose. The dose is based on the child's age or weight.  3. If you have other health problems (like cancer, heart failure, an organ transplant or severe kidney disease): Call your specialty clinic if you don't feel better in the next 2 days.  4. Know when to call 911. Emergency warning signs include:  ? Trouble breathing or shortness of breath  ? Pain or pressure in the chest that doesn't go away  ? Feeling confused like you haven't felt before, or not being able to wake up  ? Bluish-colored lips or face  5. Your doctor may have prescribed a blood thinner medicine. Follow their instructions.  Where can I get more information?     [facility] - About COVID-19: [email or web address]    CDC - What to Do If You're Sick: www.cdc.gov/coronavirus/2019-ncov/about/steps-when-sick.html    CDC - Ending Home Isolation:  www.cdc.gov/coronavirus/2019-ncov/hcp/disposition-in-home-patients.html    CDC - Caring for Someone: www.cdc.gov/coronavirus/2019-ncov/if-you-are-sick/care-for-someone.html    [facility] - Interim Guidance for Hospital Discharge to Home: www.[facility].[place]/diseases/coronavirus/hcp/hospdischarge.pdf    [facility] clinical trials (COVID-19 research studies): clinicalaffairs.[facility]-clinical-trials    Below are the COVID-19 hotlines at the [place] Department of Health ([facility]). Interpreters are available.  ? For health questions: Call 683-052-1667 or 1-670.447.8908 (7 a.m. to 7 p.m.)  ? For questions about schools and childcare: Call 944-896-8372 or 1-952.631.3743 (7 a.m. to 7 p.m.)    For informational purposes only. Not to replace the advice of your health care provider. Clinically reviewed by the Infection Prevention Team. Copyright   2020 [facility]. All rights reserved. [employer] [id number] - REV 08/04/20.

## 2020-08-27 NOTE — LETTER
2020       RE: Jenae Renee  [address]  [place] [address]     Dear Colleague,    Thank you for referring your patient, Jenae Renee, to the [facility] at [facility]. Please see a copy of my visit note below.    S OB Intake note  Subjective   29 year old woman presents to clinic for initiation of OB care. Patient's last menstrual period was 2020.  at 10w0d by Estimated Date of Delivery: Mar 25, 2021 based on LMP. Reviewed dating ultrasound. Pregnancy is planned.      Symptoms since LMP include nausea and vomiting she is not keeping much down including fluids are difficult  Weight loss 12 #   She has been to the ED twice for IV fluids   . Patient has tried these relief measures: Zofran, diet modification, small frequent meals, increased rest and increased fluids.    - Genetic/Infection questionnaire completed, risks include sister cleft lip palate.  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 Pt  does not have a recent known exposure to Parvo or CMV so IgG/IgM testing WILL NOT be ordered.   Have you traveled during the pregnancy?No  Have your sexual partner(s) travelled during the pregnancy?No      - Current Medications PNV, reglan     Current Outpatient Medications   Medication Sig Dispense Refill     metoclopramide (REGLAN) 10 MG tablet Take 1 tablet (10 mg) by mouth 4 times daily (before meals and nightly) 30 tablet 1     doxylamine (UNISOM) 25 MG TABS tablet Take 0.5 tablets (12.5 mg) by mouth every 8 hours as needed (nausea) 30 each 0     FOLIC ACID PO Take 400 mcg by mouth daily       ondansetron (ZOFRAN) 4 MG tablet Take 1 tablet (4 mg) by mouth every 8 hours as needed for nausea or vomiting 30 tablet 1     Prenatal Vit-Fe Fumarate-FA (PRENATAL VITAMIN PO) Take 1 tablet by mouth daily       pyridOXINE (VITAMIN B6) 25 MG tablet Take 1 tablet (25 mg) by mouth every 8 hours as needed (nausea) 30 tablet 0         - Co-morbids  nause and vomitnig with weight loss    Past Medical History:   Diagnosis Date     Ulcer, gastric, acute     Duodenal ulcer.     - Risk for GDM -  has First degree relative with GDM or DM  WILL NOT have an early GCT and possible Hgb A1C    - High Risk for Pre E-  Has No known risk factors of High risk for Pre E so WILL NOT start low dose aspirin (81mg) starting between 12 and 28 weeks to prevent early onset preeclampsia.    - Moderate risk - has moderate risk factors for Pre E including Family history of preeclampsia (mother or sister)     Since she meets one of the moderate risk facrtors for Pre E -   so WILL NOT consider starting low dose aspirin (81mg) starting between 12 and 28 weeks to prevent early onset preeclampsia.    - The patient  does not have a history of spontaneous  birth so  WILL NOT consider progesterone starting at 16-20 weeks and/or serial transvaginal cervical length ultrasounds from 16-24 weeks.     -The patient does not have a history of immunosuppresion or HIV so Toxoplasma IgG/IgM WILL NOT be ordered.     PERSONAL/SOCIAL HISTORY    lives with their family.  Employment: Full time. Her job involves homemaker   History of anxiety or depression  no  Additional items: None    Objective  -VS: reviewed and within normal limits   -General appearance: no acute distress, patient is comfortable   NEUROLOGICAL/PSYCHIATRIC   - Orientated x3,   -Mood and affect: : normal     Assessment/Plan  Jenae was seen today for prenatal care.    Diagnoses and all orders for this visit:    Nausea and vomiting, intractability of vomiting not specified, unspecified vomiting type  -     metoclopramide (REGLAN) 10 MG tablet; Take 1 tablet (10 mg) by mouth 4 times daily (before meals and nightly)    Supervision of other normal pregnancy  -     metoclopramide (REGLAN) 10 MG tablet; Take 1 tablet (10 mg) by mouth 4 times daily (before meals and nightly)        29 year old  10w0d weeks of pregnancy with DENA  of Mar 25, 2021 by LMP of Patient's last menstrual period was 06/18/2020.. Ultrasound confirms.   Outpatient Encounter Medications as of 8/27/2020   Medication Sig Dispense Refill     metoclopramide (REGLAN) 10 MG tablet Take 1 tablet (10 mg) by mouth 4 times daily (before meals and nightly) 30 tablet 1     doxylamine (UNISOM) 25 MG TABS tablet Take 0.5 tablets (12.5 mg) by mouth every 8 hours as needed (nausea) 30 each 0     FOLIC ACID PO Take 400 mcg by mouth daily       ondansetron (ZOFRAN) 4 MG tablet Take 1 tablet (4 mg) by mouth every 8 hours as needed for nausea or vomiting 30 tablet 1     Prenatal Vit-Fe Fumarate-FA (PRENATAL VITAMIN PO) Take 1 tablet by mouth daily       pyridOXINE (VITAMIN B6) 25 MG tablet Take 1 tablet (25 mg) by mouth every 8 hours as needed (nausea) 30 tablet 0     No facility-administered encounter medications on file as of 8/27/2020.     No orders of the defined types were placed in this encounter.                No orders of the defined types were placed in this encounter.      IV home infusion center contacted per RN therapy plan placed for IV fluids    Pt sent to lab   - Oriented to Practice, types of care, and how to reach a provider.  Pt prefers MD team  - Patient received 1st trimester new OB education packet complete with aide of The Expectant Family booklet including information on genetic screening test options.  - Patient desires 1st trimester screening which was ordered.  - Educational handout on the prevention of infections diseases during pregnancy provided.  - Patient was encouraged to start prenatal vitamins as tolerated.    - Patient was sent to lab for routine OB labs including .   -- Pregnancy concerns to be addressed by provider at new OB exam include: none.    Pt to RTO for NOB visit in 3 weeks and prn if questions or concerns    JANELLE Farah CNM

## 2020-08-27 NOTE — PROGRESS NOTES
[facility] OB Intake note  Subjective   29 year old woman presents to clinic for initiation of OB care. Patient's last menstrual period was 2020.  at 10w0d by Estimated Date of Delivery: Mar 25, 2021 based on LMP. Reviewed dating ultrasound. Pregnancy is planned.      Symptoms since LMP include nausea and vomiting she is not keeping much down including fluids are difficult  Weight loss 12 #   She has been to the ED twice for IV fluids   . Patient has tried these relief measures: Zofran, diet modification, small frequent meals, increased rest and increased fluids.    - Genetic/Infection questionnaire completed, risks include sister cleft lip palate.  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 Pt  does not have a recent known exposure to Parvo or CMV so IgG/IgM testing WILL NOT be ordered.   Have you traveled during the pregnancy?No  Have your sexual partner(s) travelled during the pregnancy?No      - Current Medications PNV, reglan     Current Outpatient Medications   Medication Sig Dispense Refill     metoclopramide (REGLAN) 10 MG tablet Take 1 tablet (10 mg) by mouth 4 times daily (before meals and nightly) 30 tablet 1     doxylamine (UNISOM) 25 MG TABS tablet Take 0.5 tablets (12.5 mg) by mouth every 8 hours as needed (nausea) 30 each 0     FOLIC ACID PO Take 400 mcg by mouth daily       ondansetron (ZOFRAN) 4 MG tablet Take 1 tablet (4 mg) by mouth every 8 hours as needed for nausea or vomiting 30 tablet 1     Prenatal Vit-Fe Fumarate-FA (PRENATAL VITAMIN PO) Take 1 tablet by mouth daily       pyridOXINE (VITAMIN B6) 25 MG tablet Take 1 tablet (25 mg) by mouth every 8 hours as needed (nausea) 30 tablet 0         - Co-morbids nause and vomitnig with weight loss    Past Medical History:   Diagnosis Date     Ulcer, gastric, acute     Duodenal ulcer.     - Risk for GDM -  has First degree relative with GDM or DM  WILL NOT have an early GCT and possible Hgb A1C    - High Risk for Pre E-  Has No known risk factors of  High risk for Pre E so WILL NOT start low dose aspirin (81mg) starting between 12 and 28 weeks to prevent early onset preeclampsia.    - Moderate risk - has moderate risk factors for Pre E including Family history of preeclampsia (mother or sister)     Since she meets one of the moderate risk facrtors for Pre E -   so WILL NOT consider starting low dose aspirin (81mg) starting between 12 and 28 weeks to prevent early onset preeclampsia.    - The patient  does not have a history of spontaneous  birth so  WILL NOT consider progesterone starting at 16-20 weeks and/or serial transvaginal cervical length ultrasounds from 16-24 weeks.     -The patient does not have a history of immunosuppresion or HIV so Toxoplasma IgG/IgM WILL NOT be ordered.     PERSONAL/SOCIAL HISTORY    lives with their family.  Employment: Full time. Her job involves homemaker   History of anxiety or depression  no  Additional items: None    Objective  -VS: reviewed and within normal limits   -General appearance: no acute distress, patient is comfortable   NEUROLOGICAL/PSYCHIATRIC   - Orientated x3,   -Mood and affect: : normal     Assessment/Plan  Jenae was seen today for prenatal care.    Diagnoses and all orders for this visit:    Nausea and vomiting, intractability of vomiting not specified, unspecified vomiting type  -     metoclopramide (REGLAN) 10 MG tablet; Take 1 tablet (10 mg) by mouth 4 times daily (before meals and nightly)    Supervision of other normal pregnancy  -     metoclopramide (REGLAN) 10 MG tablet; Take 1 tablet (10 mg) by mouth 4 times daily (before meals and nightly)        29 year old  10w0d weeks of pregnancy with DENA of Mar 25, 2021 by LMP of Patient's last menstrual period was 2020.. Ultrasound confirms.   Outpatient Encounter Medications as of 2020   Medication Sig Dispense Refill     metoclopramide (REGLAN) 10 MG tablet Take 1 tablet (10 mg) by mouth 4 times daily (before meals and  nightly) 30 tablet 1     doxylamine (UNISOM) 25 MG TABS tablet Take 0.5 tablets (12.5 mg) by mouth every 8 hours as needed (nausea) 30 each 0     FOLIC ACID PO Take 400 mcg by mouth daily       ondansetron (ZOFRAN) 4 MG tablet Take 1 tablet (4 mg) by mouth every 8 hours as needed for nausea or vomiting 30 tablet 1     Prenatal Vit-Fe Fumarate-FA (PRENATAL VITAMIN PO) Take 1 tablet by mouth daily       pyridOXINE (VITAMIN B6) 25 MG tablet Take 1 tablet (25 mg) by mouth every 8 hours as needed (nausea) 30 tablet 0     No facility-administered encounter medications on file as of 8/27/2020.     No orders of the defined types were placed in this encounter.                No orders of the defined types were placed in this encounter.      IV home infusion center contacted per RN therapy plan placed for IV fluids    Pt sent to lab   - Oriented to Practice, types of care, and how to reach a provider.  Pt prefers MD team  - Patient received 1st trimester new OB education packet complete with aide of The Expectant Family booklet including information on genetic screening test options.  - Patient desires 1st trimester screening which was ordered.  - Educational handout on the prevention of infections diseases during pregnancy provided.  - Patient was encouraged to start prenatal vitamins as tolerated.    - Patient was sent to lab for routine OB labs including .   -- Pregnancy concerns to be addressed by provider at new OB exam include: none.    Pt to RTO for NOB visit in 3 weeks and prn if questions or concerns    JANELLE Farah CNM

## 2020-08-28 ENCOUNTER — TRANSCRIBE ORDERS (OUTPATIENT)
Dept: MATERNAL FETAL MEDICINE | Facility: CLINIC | Age: 30
End: 2020-08-28

## 2020-08-28 DIAGNOSIS — O26.90 PREGNANCY RELATED CONDITION, ANTEPARTUM: Primary | ICD-10-CM

## 2020-08-28 PROBLEM — E05.90 SUBCLINICAL HYPERTHYROIDISM: Status: ACTIVE | Noted: 2020-08-28

## 2020-08-28 LAB
BACTERIA SPEC CULT: NORMAL
DEPRECATED CALCIDIOL+CALCIFEROL SERPL-MC: 10 UG/L (ref 20–75)
HBV SURFACE AG SERPL QL IA: NONREACTIVE
HCV AB SERPL QL IA: NONREACTIVE
HIV 1+2 AB+HIV1 P24 AG SERPL QL IA: NONREACTIVE
Lab: NORMAL
RUBV IGG SERPL IA-ACNC: 15 IU/ML
SPECIMEN SOURCE: NORMAL
T PALLIDUM AB SER QL: NONREACTIVE

## 2020-08-28 NOTE — RESULT ENCOUNTER NOTE
Informed patient of low TSH result and plan to recheck in 4-6 weeks. Pt expressed understanding and agrees with plan    Lambert Waddell RN on 8/28/2020 at 12:52 PM

## 2020-08-31 ENCOUNTER — HOME INFUSION (PRE-WILLOW HOME INFUSION) (OUTPATIENT)
Dept: PHARMACY | Facility: CLINIC | Age: 30
End: 2020-08-31

## 2020-08-31 ENCOUNTER — TELEPHONE (OUTPATIENT)
Dept: OBGYN | Facility: CLINIC | Age: 30
End: 2020-08-31

## 2020-08-31 NOTE — TELEPHONE ENCOUNTER
Cee from [facility] calling for infusion  orders.  Noted with Cee that orders were placed last week for infusion - noted in therapy    She states she was not aware of this.     She will now check to see if pts insurance has been determined

## 2020-09-01 ENCOUNTER — HOME INFUSION (PRE-WILLOW HOME INFUSION) (OUTPATIENT)
Dept: PHARMACY | Facility: CLINIC | Age: 30
End: 2020-09-01

## 2020-09-01 NOTE — PROGRESS NOTES
This is a recent snapshot of the patient's [facility] medical record.  For current drug dose and complete information and questions, call [phone] or In [facility] pool, fv home infusion ([id number])  CSN Number:  [id number]

## 2020-09-02 ENCOUNTER — HOME INFUSION (PRE-WILLOW HOME INFUSION) (OUTPATIENT)
Dept: PHARMACY | Facility: CLINIC | Age: 30
End: 2020-09-02

## 2020-09-02 ENCOUNTER — MEDICAL CORRESPONDENCE (OUTPATIENT)
Dept: HEALTH INFORMATION MANAGEMENT | Facility: CLINIC | Age: 30
End: 2020-09-02

## 2020-09-02 NOTE — PROGRESS NOTES
This is a recent snapshot of the patient's [facility] medical record.  For current drug dose and complete information and questions, call [phone] or In Basket pool, fv home infusion ([id number])  CSN Number:  [id number]

## 2020-09-03 NOTE — PROGRESS NOTES
This is a recent snapshot of the patient's [facility] medical record.  For current drug dose and complete information and questions, call [phone] or In Basket pool, fv home infusion ([id number])  CSN Number:  [id number]

## 2020-09-08 ENCOUNTER — HOME INFUSION (PRE-WILLOW HOME INFUSION) (OUTPATIENT)
Dept: PHARMACY | Facility: CLINIC | Age: 30
End: 2020-09-08

## 2020-09-09 ENCOUNTER — HOME INFUSION (PRE-WILLOW HOME INFUSION) (OUTPATIENT)
Dept: PHARMACY | Facility: CLINIC | Age: 30
End: 2020-09-09

## 2020-09-10 NOTE — PROGRESS NOTES
This is a recent snapshot of the patient's [facility] medical record.  For current drug dose and complete information and questions, call [phone] or In Basket pool, fv home infusion ([id number])  CSN Number:  [id number]

## 2020-09-11 NOTE — PROGRESS NOTES
This is a recent snapshot of the patient's [facility] medical record.  For current drug dose and complete information and questions, call [phone] or In Basket pool, fv home infusion ([id number])  CSN Number:  [id number]

## 2020-09-14 ENCOUNTER — HOME INFUSION (PRE-WILLOW HOME INFUSION) (OUTPATIENT)
Dept: PHARMACY | Facility: CLINIC | Age: 30
End: 2020-09-14

## 2020-09-15 ENCOUNTER — HOME INFUSION (PRE-WILLOW HOME INFUSION) (OUTPATIENT)
Dept: PHARMACY | Facility: CLINIC | Age: 30
End: 2020-09-15

## 2020-09-15 NOTE — PROGRESS NOTES
This is a recent snapshot of the patient's [facility] medical record.  For current drug dose and complete information and questions, call [phone] or In Basket pool, fv home infusion ([id number])  CSN Number:  [id number]

## 2020-09-17 NOTE — PROGRESS NOTES
This is a recent snapshot of the patient's [facility] medical record.  For current drug dose and complete information and questions, call [phone] or In Basket pool, fv home infusion ([id number])  CSN Number:  [id number]

## 2020-09-22 ENCOUNTER — HOME INFUSION (PRE-WILLOW HOME INFUSION) (OUTPATIENT)
Dept: PHARMACY | Facility: CLINIC | Age: 30
End: 2020-09-22

## 2020-09-24 ENCOUNTER — OFFICE VISIT (OUTPATIENT)
Dept: OBGYN | Facility: CLINIC | Age: 30
End: 2020-09-24
Attending: OBSTETRICS & GYNECOLOGY
Payer: COMMERCIAL

## 2020-09-24 ENCOUNTER — HOME INFUSION (PRE-WILLOW HOME INFUSION) (OUTPATIENT)
Dept: PHARMACY | Facility: CLINIC | Age: 30
End: 2020-09-24

## 2020-09-24 VITALS — HEIGHT: 69 IN | BODY MASS INDEX: 26.32 KG/M2 | WEIGHT: 177.7 LBS

## 2020-09-24 DIAGNOSIS — K59.01 SLOW TRANSIT CONSTIPATION: ICD-10-CM

## 2020-09-24 DIAGNOSIS — O09.892 SUPERVISION OF OTHER HIGH RISK PREGNANCIES, SECOND TRIMESTER: Primary | ICD-10-CM

## 2020-09-24 DIAGNOSIS — O21.9 NAUSEA AND VOMITING IN PREGNANCY: ICD-10-CM

## 2020-09-24 RX ORDER — SENNA AND DOCUSATE SODIUM 50; 8.6 MG/1; MG/1
1-2 TABLET, FILM COATED ORAL 2 TIMES DAILY PRN
Qty: 60 TABLET | Refills: 3 | Status: SHIPPED | OUTPATIENT
Start: 2020-09-24 | End: 2021-03-17

## 2020-09-24 RX ORDER — ONDANSETRON 4 MG/1
4 TABLET, FILM COATED ORAL EVERY 8 HOURS PRN
Qty: 30 TABLET | Refills: 1 | Status: SHIPPED | OUTPATIENT
Start: 2020-09-24 | End: 2021-03-17

## 2020-09-24 ASSESSMENT — MIFFLIN-ST. JEOR: SCORE: 1580.42

## 2020-09-24 NOTE — LETTER
2020       RE: Jenae Renee  [address]  [place] [address]     Dear Colleague,    Thank you for referring your patient, Jenae Renee, to the [facility] at [facility]. Please see a copy of my visit note below.    S:  Overall feeling some improvement in her nausea but keeping little solid food down.  She is getting 3L a week of IV fluid.  Zofran is helping but it is making her very constipated-having 1 BM a week. Her level II is scheduled on 10/23.    O: see flow    A:  31 y/o  at 14+0 weeks with ongoing n/v of pregnancy.  Slow improvement over the last few weeks.    P:  Continue to attempt PO hydration and work on increasing intake of solid food.  Scheduled new OB in the next 2 weeks, level II 10/23 as planned.  Rx for senna-s sent (declines Miralax or a suppository).      Kim Farley MD, FACOG

## 2020-09-24 NOTE — PROGRESS NOTES
S:  Overall feeling some improvement in her nausea but keeping little solid food down.  She is getting 3L a week of IV fluid.  Zofran is helping but it is making her very constipated-having 1 BM a week. Her level II is scheduled on 10/23.    O: see flow    A:  29 y/o  at 14+0 weeks with ongoing n/v of pregnancy.  Slow improvement over the last few weeks.    P:  Continue to attempt PO hydration and work on increasing intake of solid food.  Scheduled new OB in the next 2 weeks, level II 10/23 as planned.  Rx for senna-s sent (declines Miralax or a suppository).      Kim Farley MD, FACOG

## 2020-09-24 NOTE — PROGRESS NOTES
This is a recent snapshot of the patient's [facility] medical record.  For current drug dose and complete information and questions, call [phone] or In Basket pool, fv home infusion ([id number])  CSN Number:  [id number]

## 2020-09-25 NOTE — PROGRESS NOTES
This is a recent snapshot of the patient's [facility] medical record.  For current drug dose and complete information and questions, call [phone] or In Basket pool, fv home infusion ([id number])  CSN Number:  [id number]

## 2020-09-28 ENCOUNTER — HOME INFUSION (PRE-WILLOW HOME INFUSION) (OUTPATIENT)
Dept: PHARMACY | Facility: CLINIC | Age: 30
End: 2020-09-28

## 2020-09-29 ENCOUNTER — HOME INFUSION (PRE-WILLOW HOME INFUSION) (OUTPATIENT)
Dept: PHARMACY | Facility: CLINIC | Age: 30
End: 2020-09-29

## 2020-09-29 NOTE — PROGRESS NOTES
This is a recent snapshot of the patient's [facility] medical record.  For current drug dose and complete information and questions, call [phone] or In Basket pool, fv home infusion ([id number])  CSN Number:  [id number]

## 2020-10-01 NOTE — PROGRESS NOTES
This is a recent snapshot of the patient's [facility] medical record.  For current drug dose and complete information and questions, call [phone] or In Basket pool, fv home infusion ([id number])  CSN Number:  [id number]

## 2020-10-06 ENCOUNTER — HOME INFUSION (PRE-WILLOW HOME INFUSION) (OUTPATIENT)
Dept: PHARMACY | Facility: CLINIC | Age: 30
End: 2020-10-06

## 2020-10-06 ENCOUNTER — TELEPHONE (OUTPATIENT)
Dept: OBGYN | Facility: CLINIC | Age: 30
End: 2020-10-06

## 2020-10-06 NOTE — TELEPHONE ENCOUNTER
Hello!    Patient is requesting discharge from home infusion services at this time.     She has a follow up appointment this afternoon.      Please let me know if it is appropriate to proceed with discharge at this time.    Thank you!  Rachell Sarabia, PharmD [facility]   Ph: [phone]  Fax: [phone]

## 2020-10-07 NOTE — PROGRESS NOTES
This is a recent snapshot of the patient's [facility] medical record.  For current drug dose and complete information and questions, call [phone] or In Basket pool, fv home infusion ([id number])  CSN Number:  [id number]

## 2020-10-08 ENCOUNTER — TELEPHONE (OUTPATIENT)
Dept: OBGYN | Facility: CLINIC | Age: 30
End: 2020-10-08

## 2020-10-08 NOTE — TELEPHONE ENCOUNTER
Received call from [facility] stating that patient wishes to discontinue IV infusions.    Last clinic visit was 2 weeks prior and patient reported N&V, but she was able to keep a little solid food down.     Called patient to verify current symptoms and I&O. No answer. Left message asking patient to call back or send [employer] message with update.    JANELLE Archibald, LEANNAM

## 2020-10-08 NOTE — TELEPHONE ENCOUNTER
Hello!   home infusion nurse last saw Jenae 9/28/20.  Per the RN documentation, Pt reports feeling a little better, has intermittent nausea with 1-2 emesis per day.  Pt reports drinking about 1.5 cups of water daily and is trying to increase that amount.  Reports tolerating a little bit more food than previously.    Patient declined RN visit this week for placement of peripheral IV line to administer IV fluids.

## 2020-10-12 ENCOUNTER — HOME INFUSION (PRE-WILLOW HOME INFUSION) (OUTPATIENT)
Dept: PHARMACY | Facility: CLINIC | Age: 30
End: 2020-10-12

## 2020-10-12 ENCOUNTER — TELEPHONE (OUTPATIENT)
Dept: OBGYN | Facility: CLINIC | Age: 30
End: 2020-10-12

## 2020-10-12 NOTE — TELEPHONE ENCOUNTER
Per Shana, pharmacist at  home infusion, patient is no longer interested in home infusion. Attempt by Paty Eubanks to call patient to discuss on 10/8 but no return call from patient. Gave verbal order OK to discontinue home infusion if that is patient's desire.

## 2020-10-12 NOTE — TELEPHONE ENCOUNTER
----- Message from Virginia Nobles RN sent at 10/12/2020 11:27 AM CDT -----  Regarding: IV infusion  Shana ([phone]) from [facility] calling regarding discontinuing patient from services.  Wants to know if Asila has been called?

## 2020-10-13 NOTE — PROGRESS NOTES
This is a recent snapshot of the patient's [facility] medical record.  For current drug dose and complete information and questions, call [phone] or In Basket pool, fv home infusion ([id number])  CSN Number:  [id number]

## 2020-10-21 ENCOUNTER — PRE VISIT (OUTPATIENT)
Dept: MATERNAL FETAL MEDICINE | Facility: CLINIC | Age: 30
End: 2020-10-21

## 2020-10-23 ENCOUNTER — HOSPITAL ENCOUNTER (OUTPATIENT)
Dept: ULTRASOUND IMAGING | Facility: CLINIC | Age: 30
End: 2020-10-23
Attending: MIDWIFE
Payer: COMMERCIAL

## 2020-10-23 ENCOUNTER — OFFICE VISIT (OUTPATIENT)
Dept: MATERNAL FETAL MEDICINE | Facility: CLINIC | Age: 30
End: 2020-10-23
Attending: MIDWIFE
Payer: COMMERCIAL

## 2020-10-23 DIAGNOSIS — Z82.79 FAMILY HISTORY OF CONGENITAL ANOMALIES: ICD-10-CM

## 2020-10-23 DIAGNOSIS — O26.90 PREGNANCY RELATED CONDITION, ANTEPARTUM: ICD-10-CM

## 2020-10-23 PROCEDURE — 99207 PR NO CHARGE LOS: CPT | Performed by: OBSTETRICS & GYNECOLOGY

## 2020-10-23 PROCEDURE — 76811 OB US DETAILED SNGL FETUS: CPT

## 2020-10-23 PROCEDURE — 76811 OB US DETAILED SNGL FETUS: CPT | Mod: 26 | Performed by: OBSTETRICS & GYNECOLOGY

## 2020-10-23 NOTE — PROGRESS NOTES
Please refer to ultrasound report under 'Imaging' Studies of 'Chart Review' tabs.    Mukesh Barker M.D.

## 2020-11-16 ENCOUNTER — OFFICE VISIT (OUTPATIENT)
Dept: OBGYN | Facility: CLINIC | Age: 30
End: 2020-11-16
Attending: ADVANCED PRACTICE MIDWIFE
Payer: COMMERCIAL

## 2020-11-16 VITALS
WEIGHT: 187.4 LBS | HEART RATE: 83 BPM | BODY MASS INDEX: 27.76 KG/M2 | HEIGHT: 69 IN | SYSTOLIC BLOOD PRESSURE: 106 MMHG | DIASTOLIC BLOOD PRESSURE: 72 MMHG

## 2020-11-16 DIAGNOSIS — K21.00 GASTROESOPHAGEAL REFLUX DISEASE WITH ESOPHAGITIS WITHOUT HEMORRHAGE: ICD-10-CM

## 2020-11-16 DIAGNOSIS — D50.8 OTHER IRON DEFICIENCY ANEMIA: ICD-10-CM

## 2020-11-16 DIAGNOSIS — O92.29 BREAST PAIN IN PREGNANCY: ICD-10-CM

## 2020-11-16 DIAGNOSIS — N64.4 BREAST PAIN IN PREGNANCY: ICD-10-CM

## 2020-11-16 DIAGNOSIS — Z34.80 SUPERVISION OF OTHER NORMAL PREGNANCY: Primary | ICD-10-CM

## 2020-11-16 DIAGNOSIS — K59.01 SLOW TRANSIT CONSTIPATION: ICD-10-CM

## 2020-11-16 PROBLEM — Z34.03 ENCOUNTER FOR SUPERVISION OF NORMAL FIRST PREGNANCY IN THIRD TRIMESTER: Status: ACTIVE | Noted: 2019-03-04

## 2020-11-16 LAB — HEMOGLOBIN: 9.3 G/DL (ref 11.7–15.7)

## 2020-11-16 PROCEDURE — 85018 HEMOGLOBIN: CPT | Performed by: ADVANCED PRACTICE MIDWIFE

## 2020-11-16 PROCEDURE — 99212 OFFICE O/P EST SF 10 MIN: CPT | Performed by: ADVANCED PRACTICE MIDWIFE

## 2020-11-16 RX ORDER — ASPIRIN 81 MG
100 TABLET, DELAYED RELEASE (ENTERIC COATED) ORAL DAILY
Qty: 60 TABLET | Refills: 0 | Status: SHIPPED | OUTPATIENT
Start: 2020-11-16 | End: 2021-01-15

## 2020-11-16 RX ORDER — LANOLIN ALCOHOL/MO/W.PET/CERES
1000 CREAM (GRAM) TOPICAL DAILY
Qty: 60 TABLET | Refills: 0 | Status: SHIPPED | OUTPATIENT
Start: 2020-11-16 | End: 2021-01-15

## 2020-11-16 ASSESSMENT — MIFFLIN-ST. JEOR: SCORE: 1624.67

## 2020-11-16 ASSESSMENT — PAIN SCALES - GENERAL: PAINLEVEL: NO PAIN (0)

## 2020-11-16 NOTE — PROGRESS NOTES
"Subjective:      30 year old  at 21w4d presents for a routine prenatal appointment.       Denies vaginal bleeding or leakage of fluid.  Denies contractions or cramping.    Endorses fetal movement. She has increasing breast pain in the past two weeks that is worrying her on the outer aspect of her right breast and behind that nipple and on the inner aspect of the left breast. This is very different from the breast pain she had with her other pregnancy. She has tried changing bras and it is not helping.   No HA, visual changes, RUQ or epigastric pain.   The patient presents with the following concerns: lots of heartburn, would like this.    Level II US  results reviewed and follow-up imaging recommended. do to suboptimal view of heart.   Offered MSAFP - declines.     Objective:  Vitals:    20 1547   BP: 106/72   Pulse: 83   Weight: 85 kg (187 lb 6.4 oz)   Height: 1.753 m (5' 9.02\")     See OB flowsheet    Assessment/Plan     Encounter Diagnoses   Name Primary?     Supervision of other normal pregnancy Yes     Gastroesophageal reflux disease with esophagitis without hemorrhage      No orders of the defined types were placed in this encounter.    Orders Placed This Encounter   Medications     omeprazole (PRILOSEC) 20 MG DR capsule     Sig: Take 1 capsule (20 mg) by mouth 2 times daily     Dispense:  60 capsule     Refill:  0     - Reviewed total weight gain, applauded she has surpassed PPW encouraged continued healthy diet and exercise.     - Prilosec to help with acid reflux.    - Referral to breast center for pain on the outter aspect of right breast and behind the right nipple and in the medial aspect of left breast.   - Rx for iron, vit c and b12 d/t low hbg today, also reviewed iron rich food handout and provided it to pt.   - Reviewed why/how to contact provider.    Patient education/orders or handouts today:  PTL signs/symptoms, Fetal movement, Plan for EOB visit w labs and will schedule her followup " US with MFM today.    Return to clinic in 4 weeks and prn if questions or concerns.     Rachell Mcadams CNM

## 2020-11-16 NOTE — LETTER
"2020       RE: Jenae Renee  [address]  [place] [address]     Dear Colleague,    Thank you for referring your patient, Jenae Renee, to the [facility] [place] at [facility]. Please see a copy of my visit note below.    Subjective:      30 year old  at 21w4d presents for a routine prenatal appointment.       Denies vaginal bleeding or leakage of fluid.  Denies contractions or cramping.    Endorses fetal movement. She has increasing breast pain in the past two weeks that is worrying her on the outer aspect of her right breast and behind that nipple and on the inner aspect of the left breast. This is very different from the breast pain she had with her other pregnancy. She has tried changing bras and it is not helping.   No HA, visual changes, RUQ or epigastric pain.   The patient presents with the following concerns: lots of heartburn, would like this.    Level II US  results reviewed and follow-up imaging recommended. do to suboptimal view of heart.   Offered MSAFP - declines.     Objective:  Vitals:    20 1547   BP: 106/72   Pulse: 83   Weight: 85 kg (187 lb 6.4 oz)   Height: 1.753 m (5' 9.02\")     See OB flowsheet    Assessment/Plan     Encounter Diagnoses   Name Primary?     Supervision of other normal pregnancy Yes     Gastroesophageal reflux disease with esophagitis without hemorrhage      No orders of the defined types were placed in this encounter.    Orders Placed This Encounter   Medications     omeprazole (PRILOSEC) 20 MG DR capsule     Sig: Take 1 capsule (20 mg) by mouth 2 times daily     Dispense:  60 capsule     Refill:  0     - Reviewed total weight gain, applauded she has surpassed PPW encouraged continued healthy diet and exercise.     - Prilosec to help with acid reflux.    - Referral to breast center for pain on the outter aspect of right breast and behind the right nipple and in the medial aspect of " left breast.   - Rx for iron, vit c and b12 d/t low hbg today, also reviewed iron rich food handout and provided it to pt.   - Reviewed why/how to contact provider.    Patient education/orders or handouts today:  PTL signs/symptoms, Fetal movement, Plan for EOB visit w labs and will schedule her followup US with MFM today.    Return to clinic in 4 weeks and prn if questions or concerns.     Racehll Mcadams CNM

## 2020-11-17 ENCOUNTER — PATIENT OUTREACH (OUTPATIENT)
Dept: ONCOLOGY | Facility: CLINIC | Age: 30
End: 2020-11-17

## 2020-11-17 NOTE — PROGRESS NOTES
New Patient Oncology Nurse Navigator Note     Referring provider: Rachell Mcadams CNM    Referring Clinic/Organization: [facility]     Referred to: Gyn/Onc  Breast Center    Requested provider (if applicable): First available - did not specify     Referral Received: 11/16/20     Evaluation for : right breast pain during pregnancy     Clinical History (per Nurse review of records provided):      Patient 21 weeks pregnant. Reviewed provider note: She has increasing breast pain in the past two weeks that is worrying her on the outer aspect of her right breast and behind that nipple and on the inner aspect of the left breast. This is very different from the breast pain she had with her other pregnancy. She has tried changing bras and it is not helping.     No imaging ordered by referring provider.    Clinical Assessment / Barriers to Care (Per Nurse):    None      Records Location: [facility]     Records Needed:     None    Additional testing needed prior to consult:     Per breast center.    Referral updates and Plan:     Referred to DULCE Olivarez, with possible imaging to follow appt.      Helena Agustin, RN, BSN, OCN    [facility]  [phone]

## 2020-11-18 ENCOUNTER — TELEPHONE (OUTPATIENT)
Dept: OBGYN | Facility: CLINIC | Age: 30
End: 2020-11-18

## 2020-11-18 NOTE — LETTER
"    November 18, 2020       TO: Jenae Renee  [address]  [place] [address]         Dear Ms. Renee,    Our records indicate that you have not scheduled an appointment for a consult, as recommended by Rachell Mcadams. If you wish to schedule within ealth, we have several options to help you schedule your appointment:      Call [phone]    Visit our website at [email or web address] and click \"I Want To\" (in upper right) and select Request an Appointment    Request an appointment via [employer] at [email or web address]     If you have chosen to schedule elsewhere or if you have already made an appointment, please disregard this letter.    If you have any questions or concerns regarding the information above, please contact the [facility] at [phone].      Sincerely,      [facility]                  "

## 2020-11-18 NOTE — TELEPHONE ENCOUNTER
Oncology/Surgical Oncology Referral Request:     Specialty Requested: Medical Oncology     Referring Provider: Rachell Mcadams CNM    Referring Clinic/Organization: [facility]    Records location: [facility]     Requested Provider (if specified): Not Specified       Called pt x 2 LVM. Sending Letter.

## 2020-11-19 PROBLEM — D64.9 ANEMIA: Status: ACTIVE | Noted: 2020-11-19

## 2021-02-02 ENCOUNTER — PRENATAL OFFICE VISIT (OUTPATIENT)
Dept: OBGYN | Facility: CLINIC | Age: 31
End: 2021-02-02
Payer: COMMERCIAL

## 2021-02-02 VITALS
SYSTOLIC BLOOD PRESSURE: 122 MMHG | DIASTOLIC BLOOD PRESSURE: 73 MMHG | BODY MASS INDEX: 30.58 KG/M2 | TEMPERATURE: 98 F | HEART RATE: 95 BPM | WEIGHT: 207.19 LBS

## 2021-02-02 DIAGNOSIS — Z23 NEED FOR TDAP VACCINATION: ICD-10-CM

## 2021-02-02 DIAGNOSIS — Z34.03 ENCOUNTER FOR SUPERVISION OF NORMAL FIRST PREGNANCY IN THIRD TRIMESTER: Primary | ICD-10-CM

## 2021-02-02 DIAGNOSIS — R30.0 DYSURIA: ICD-10-CM

## 2021-02-02 LAB
ALBUMIN UR-MCNC: NEGATIVE MG/DL
APPEARANCE UR: CLEAR
BILIRUB UR QL STRIP: NEGATIVE
COLOR UR AUTO: NORMAL
GLUCOSE UR STRIP-MCNC: NEGATIVE MG/DL
HGB UR QL STRIP: NEGATIVE
KETONES UR STRIP-MCNC: NEGATIVE MG/DL
LEUKOCYTE ESTERASE UR QL STRIP: NEGATIVE
NITRATE UR QL: NEGATIVE
PH UR STRIP: 7 PH (ref 5–7)
SOURCE: NORMAL
SP GR UR STRIP: 1 (ref 1–1.03)
UROBILINOGEN UR STRIP-MCNC: NORMAL MG/DL (ref 0–2)

## 2021-02-02 PROCEDURE — 99207 PR PRENATAL VISIT: CPT | Performed by: ADVANCED PRACTICE MIDWIFE

## 2021-02-02 PROCEDURE — 81003 URINALYSIS AUTO W/O SCOPE: CPT | Performed by: ADVANCED PRACTICE MIDWIFE

## 2021-02-02 RX ORDER — OMEPRAZOLE 10 MG/1
20 CAPSULE, DELAYED RELEASE ORAL DAILY
COMMUNITY
End: 2021-02-22

## 2021-02-02 NOTE — PROGRESS NOTES
Transfer of care from [place].   Was seen at [facility] once in between.   Has concerns about bulging in her vagina and urination and pressure. No contra/lof./vb  Discussed use of belly band.   Exam is normal aside from sequela of female circ.   Will get urine to r/o UTI  Had COVID in first trimester.  Will get ultrasound for growth.  Discussed make up of clinic here.   RTC 2 weeks.   Margoth Montalvo, JANELLE, CNM

## 2021-02-22 ENCOUNTER — OFFICE VISIT (OUTPATIENT)
Dept: OBGYN | Facility: CLINIC | Age: 31
End: 2021-02-22
Attending: ADVANCED PRACTICE MIDWIFE
Payer: COMMERCIAL

## 2021-02-22 VITALS
HEIGHT: 69 IN | HEART RATE: 105 BPM | SYSTOLIC BLOOD PRESSURE: 96 MMHG | WEIGHT: 212 LBS | BODY MASS INDEX: 31.4 KG/M2 | DIASTOLIC BLOOD PRESSURE: 68 MMHG

## 2021-02-22 DIAGNOSIS — O09.93 HIGH-RISK PREGNANCY IN THIRD TRIMESTER: Primary | ICD-10-CM

## 2021-02-22 DIAGNOSIS — U07.1 CLINICAL DIAGNOSIS OF COVID-19: ICD-10-CM

## 2021-02-22 PROBLEM — Z34.80 ENCOUNTER FOR SUPERVISION OF NORMAL PREGNANCY IN MULTIGRAVIDA: Status: ACTIVE | Noted: 2020-08-27

## 2021-02-22 PROBLEM — Z34.03 ENCOUNTER FOR SUPERVISION OF NORMAL FIRST PREGNANCY IN THIRD TRIMESTER: Status: RESOLVED | Noted: 2019-03-04 | Resolved: 2021-02-22

## 2021-02-22 PROBLEM — R11.2 NAUSEA AND VOMITING, INTRACTABILITY OF VOMITING NOT SPECIFIED, UNSPECIFIED VOMITING TYPE: Status: RESOLVED | Noted: 2020-08-27 | Resolved: 2021-02-22

## 2021-02-22 PROCEDURE — 99207 PR PRENATAL VISIT: CPT | Performed by: ADVANCED PRACTICE MIDWIFE

## 2021-02-22 PROCEDURE — G0463 HOSPITAL OUTPT CLINIC VISIT: HCPCS

## 2021-02-22 RX ORDER — FERROUS GLUCONATE 324(38)MG
TABLET ORAL
Status: CANCELLED | OUTPATIENT
Start: 2021-02-22

## 2021-02-22 RX ORDER — FERROUS SULFATE 325(65) MG
325 TABLET ORAL
Qty: 60 TABLET | Refills: 1 | Status: SHIPPED | OUTPATIENT
Start: 2021-02-22 | End: 2021-04-29

## 2021-02-22 RX ORDER — FERROUS GLUCONATE 324(38)MG
TABLET ORAL
COMMUNITY
Start: 2020-04-09 | End: 2021-03-17

## 2021-02-22 RX ORDER — OMEPRAZOLE 10 MG/1
20 CAPSULE, DELAYED RELEASE ORAL DAILY
Qty: 60 CAPSULE | Refills: 1 | Status: SHIPPED | OUTPATIENT
Start: 2021-02-22 | End: 2021-03-05

## 2021-02-22 ASSESSMENT — MIFFLIN-ST. JEOR: SCORE: 1736.01

## 2021-02-22 ASSESSMENT — PAIN SCALES - GENERAL: PAINLEVEL: NO PAIN (0)

## 2021-02-22 NOTE — LETTER
"2021       RE: Jenae Renee  [address]  [place] [address]     Dear Colleague,    Thank you for referring your patient, Jenae Renee, to the [facility] [place] at [facility]. Please see a copy of my visit note below.    Subjective:      30 year old  at 35w4d presents for a routine prenatal appointment.         Denies vaginal bleeding,  leakage of fluid, or change in vaginal discharge.  Denies contractions.  Endorses fetal movement.    No HA, visual changes, RUQ.  Endorses wrapping epigastric pain, onset 2x weeks ago. She reports that the pain is equal bilaterally and believes it is musculoskeletal from fetal growth.    Patient concerns: Reports palpitations and tachycardia. Endorses hx of iron deficiency anemia. Hg of 9.3 on , reports resolving Hg after iron supplementation at [facility] approx 2 months ago. Is taking 2 iron supplement tablets daily per prescription. Denies bleeding from vagina, mouth, in stool. Reports pressure/bulging in vagina unchanged from last visit. Nausea and emesis improved with IV fluids and zofran. Feeling well overall.    Also reports at [facility] that she normal G&C screen, glucose challenge, and thyroid panel. History hypothyroid in first trimester.    History of uncomplicated covid in first trimester with no residual symptoms.    Objective:  Vitals:    21 1116   BP: 96/68   Pulse: 105   Weight: 96.2 kg (212 lb)   Height: 1.753 m (5' 9\")    See OB flowsheet    FHR: 156  Fundal height: 36    Assessment/Plan     Encounter Diagnoses   Name Primary?     High-risk pregnancy in third trimester Yes     Clinical diagnosis of COVID-19      Orders Placed This Encounter   Procedures     US OB >14 Weeks Follow Up     Orders Placed This Encounter   Medications     ferrous gluconate (FERGON) 324 (38 Fe) MG tablet     Sig: TAKE 1 TABLET(324 MG) BY MOUTH THREE TIMES DAILY WITH MEALS     omeprazole " (PRILOSEC) 10 MG DR capsule     Sig: Take 2 capsules (20 mg) by mouth daily     Dispense:  60 capsule     Refill:  1     ferrous sulfate (FEROSUL) 325 (65 Fe) MG tablet     Sig: Take 1 tablet (325 mg) by mouth daily (with breakfast)     Dispense:  60 tablet     Refill:  1           GBS screening: -- Plan in 1 week at f/u  Labor signs discussed. Reinforced daily fetal movement counts.  Reviewed why/how to contact provider if headache/visual changes/RUQ or epigastric pain, decreased fetal movement, vaginal bleeding, leakage of fluid.  Advised use of 1 iron supplement daily--evidence doesn't support more  Will recheck Hg at 1 week followup   Return to clinic in 1 week and prn if questions or concerns.       I, Waldemar Nunn, am serving as a scribe; to document services personally performed by  Janine Lebron based on data collection and the provider's statements to me.     Waldemar Nunn MS3    I agree with the PFSH and ROS as completed by KARLY Lopez except for changes made by me. The remainder of the encounter was performed by me and scribed by Waldemar Nunn MS3. The scribed note accurately reflects my personal services and decisions made by me.   JANELLE Glass CNM

## 2021-02-22 NOTE — PROGRESS NOTES
"Subjective:      30 year old  at 35w4d presents for a routine prenatal appointment.         Denies vaginal bleeding,  leakage of fluid, or change in vaginal discharge.  Denies contractions.  Endorses fetal movement.    No HA, visual changes, RUQ.  Endorses wrapping epigastric pain, onset 2x weeks ago. She reports that the pain is equal bilaterally and believes it is musculoskeletal from fetal growth.    Patient concerns: Reports palpitations and tachycardia. Endorses hx of iron deficiency anemia. Hg of 9.3 on , reports resolving Hg after iron supplementation at [facility] approx 2 months ago. Is taking 2 iron supplement tablets daily per prescription. Denies bleeding from vagina, mouth, in stool. Reports pressure/bulging in vagina unchanged from last visit. Nausea and emesis improved with IV fluids and zofran. Feeling well overall.    Also reports at [facility] that she normal G&C screen, glucose challenge, and thyroid panel. History hypothyroid in first trimester.    History of uncomplicated covid in first trimester with no residual symptoms.    Objective:  Vitals:    21 1116   BP: 96/68   Pulse: 105   Weight: 96.2 kg (212 lb)   Height: 1.753 m (5' 9\")    See OB flowsheet    FHR: 156  Fundal height: 36    Assessment/Plan     Encounter Diagnoses   Name Primary?     High-risk pregnancy in third trimester Yes     Clinical diagnosis of COVID-19      Orders Placed This Encounter   Procedures     US OB >14 Weeks Follow Up     Orders Placed This Encounter   Medications     ferrous gluconate (FERGON) 324 (38 Fe) MG tablet     Sig: TAKE 1 TABLET(324 MG) BY MOUTH THREE TIMES DAILY WITH MEALS     omeprazole (PRILOSEC) 10 MG DR capsule     Sig: Take 2 capsules (20 mg) by mouth daily     Dispense:  60 capsule     Refill:  1     ferrous sulfate (FEROSUL) 325 (65 Fe) MG tablet     Sig: Take 1 tablet (325 mg) by mouth daily (with breakfast)     Dispense:  60 tablet     Refill:  1           GBS screening: -- Plan in 1 " week at f/u  Labor signs discussed. Reinforced daily fetal movement counts.  Reviewed why/how to contact provider if headache/visual changes/RUQ or epigastric pain, decreased fetal movement, vaginal bleeding, leakage of fluid.  Advised use of 1 iron supplement daily--evidence doesn't support more  Will recheck Hg at 1 week followup   Return to clinic in 1 week and prn if questions or concerns.       I, Waldemar Nunn, am serving as a scribe; to document services personally performed by  Janine Lebron based on data collection and the provider's statements to me.     Waldemar Nunn MS3    I agree with the PFSH and ROS as completed by Waldemar Nunn MS3 except for changes made by me. The remainder of the encounter was performed by me and scribed by Waldemar Nunn MS3. The scribed note accurately reflects my personal services and decisions made by me.   JANELLE Glass CNM

## 2021-02-25 ENCOUNTER — TELEPHONE (OUTPATIENT)
Dept: OBGYN | Facility: CLINIC | Age: 31
End: 2021-02-25

## 2021-02-25 DIAGNOSIS — K21.00 GASTROESOPHAGEAL REFLUX DISEASE WITH ESOPHAGITIS WITHOUT HEMORRHAGE: Primary | ICD-10-CM

## 2021-03-03 ENCOUNTER — ANCILLARY PROCEDURE (OUTPATIENT)
Dept: ULTRASOUND IMAGING | Facility: CLINIC | Age: 31
End: 2021-03-03
Attending: ADVANCED PRACTICE MIDWIFE
Payer: COMMERCIAL

## 2021-03-03 DIAGNOSIS — U07.1 CLINICAL DIAGNOSIS OF COVID-19: ICD-10-CM

## 2021-03-03 PROBLEM — O36.63X0 EXCESSIVE FETAL GROWTH AFFECTING MANAGEMENT OF PREGNANCY IN THIRD TRIMESTER: Status: ACTIVE | Noted: 2021-03-03

## 2021-03-03 PROCEDURE — 76816 OB US FOLLOW-UP PER FETUS: CPT

## 2021-03-03 PROCEDURE — 76816 OB US FOLLOW-UP PER FETUS: CPT | Mod: 26 | Performed by: OBSTETRICS & GYNECOLOGY

## 2021-03-05 ENCOUNTER — OFFICE VISIT (OUTPATIENT)
Dept: OBGYN | Facility: CLINIC | Age: 31
End: 2021-03-05
Attending: ADVANCED PRACTICE MIDWIFE
Payer: COMMERCIAL

## 2021-03-05 VITALS — BODY MASS INDEX: 32.24 KG/M2 | HEIGHT: 69 IN | WEIGHT: 217.7 LBS

## 2021-03-05 DIAGNOSIS — O09.93 HIGH-RISK PREGNANCY IN THIRD TRIMESTER: Primary | ICD-10-CM

## 2021-03-05 PROCEDURE — 87653 STREP B DNA AMP PROBE: CPT | Performed by: OBSTETRICS & GYNECOLOGY

## 2021-03-05 PROCEDURE — 99207 PR PRENATAL VISIT: CPT | Performed by: OBSTETRICS & GYNECOLOGY

## 2021-03-05 PROCEDURE — G0463 HOSPITAL OUTPT CLINIC VISIT: HCPCS

## 2021-03-05 ASSESSMENT — MIFFLIN-ST. JEOR: SCORE: 1761.86

## 2021-03-05 NOTE — PROGRESS NOTES
31 yo  at 37+1 weeks here for SALINA  Doing well.   Patient Active Problem List   Diagnosis     Multip CNM     Subclinical hyperthyroidism     Anemia     Need for Tdap vaccination     Overweight (BMI 25.0-29.9)     Preventative health care     Tinihsan howard     Clinical diagnosis of COVID-19     LGA at 36 weeks 90th %ile and AC >97th %ile     Pregnancy complicated by:  Late transfer  LGA    Briefly discussed delivery risks given EFW and AC especially in setting of one previous pre-term delivery of 5lbs.     States labor when fast once active labor began. IOL for nonreassuring fetal status, 3 days induction, 4 hours painful labor.   Jeanie Amezquita MD

## 2021-03-05 NOTE — LETTER
3/5/2021       RE: Jenae Renee  [address]  [place] [address]     Dear Colleague,    Thank you for referring your patient, Jenae Renee, to the [facility] [place] at [facility]. Please see a copy of my visit note below.    29 yo  at 37+1 weeks here for SALINA  Doing well.   Patient Active Problem List   Diagnosis     Multip CNM     Subclinical hyperthyroidism     Anemia     Need for Tdap vaccination     Overweight (BMI 25.0-29.9)     Preventative health care     Tinea cruris     Clinical diagnosis of COVID-19     LGA at 36 weeks 90th %ile and AC >97th %ile     Pregnancy complicated by:  Late transfer  LGA    Briefly discussed delivery risks given EFW and AC especially in setting of one previous pre-term delivery of 5lbs.     States labor when fast once active labor began. IOL for nonreassuring fetal status, 3 days induction, 4 hours painful labor.   Jeanie Amezquita MD

## 2021-03-06 LAB
GP B STREP DNA SPEC QL NAA+PROBE: POSITIVE
SPECIMEN SOURCE: ABNORMAL

## 2021-03-08 ENCOUNTER — TELEPHONE (OUTPATIENT)
Dept: OBGYN | Facility: CLINIC | Age: 31
End: 2021-03-08

## 2021-03-08 NOTE — TELEPHONE ENCOUNTER
Received call from patient through emergency line with concern for onset of labor at 37w4d.     Patient states she noted a clear mucousy discharge around 12:00 pm. This caused some wetness in her underwear but patient denies gush of fluid. States that she noted some increase in rectal pressure following squatting down to the floor earlier today.     She denies painful contractions. States baby is active. Has noted some painless uterine tightening late in this pregnancy and does not think that she is noticing any more frequent tightening or that it is associated with an increase in rectal pressure.     Patient states she has concern for fast labor as this happened to her mother and sister and at last exam she was told baby was low in pelvis.    Advised patient that her current symptoms of clear mucous discharge and pressure could be start of labor. Also could be consistent with baby low in the pelvis and pressure associated with this. Advised patient that if she is comfortable, she can monitor these symptoms.     Discussed signs of labor and ROM. Patient states she is comfortable monitoring for painful contractions, LOF, and will have a low threshold for seeking evaluation if pelvic pressure continues.

## 2021-03-10 ENCOUNTER — OFFICE VISIT (OUTPATIENT)
Dept: OBGYN | Facility: CLINIC | Age: 31
End: 2021-03-10
Attending: ADVANCED PRACTICE MIDWIFE
Payer: COMMERCIAL

## 2021-03-10 VITALS
BODY MASS INDEX: 31.68 KG/M2 | HEIGHT: 69 IN | HEART RATE: 87 BPM | SYSTOLIC BLOOD PRESSURE: 102 MMHG | WEIGHT: 213.9 LBS | DIASTOLIC BLOOD PRESSURE: 69 MMHG

## 2021-03-10 DIAGNOSIS — O36.63X0 EXCESSIVE FETAL GROWTH AFFECTING MANAGEMENT OF PREGNANCY IN THIRD TRIMESTER, SINGLE OR UNSPECIFIED FETUS: ICD-10-CM

## 2021-03-10 DIAGNOSIS — Z34.80 ENCOUNTER FOR SUPERVISION OF NORMAL PREGNANCY IN MULTIGRAVIDA: Primary | ICD-10-CM

## 2021-03-10 DIAGNOSIS — O99.013 ANEMIA DURING PREGNANCY IN THIRD TRIMESTER: ICD-10-CM

## 2021-03-10 DIAGNOSIS — E05.90 SUBCLINICAL HYPERTHYROIDISM: ICD-10-CM

## 2021-03-10 DIAGNOSIS — O99.820 GBS (GROUP B STREPTOCOCCUS CARRIER), +RV CULTURE, CURRENTLY PREGNANT: ICD-10-CM

## 2021-03-10 LAB — HEMOGLOBIN: 10.8 G/DL (ref 11.7–15.7)

## 2021-03-10 PROCEDURE — 99207 PR PRENATAL VISIT: CPT | Performed by: ADVANCED PRACTICE MIDWIFE

## 2021-03-10 PROCEDURE — G0463 HOSPITAL OUTPT CLINIC VISIT: HCPCS

## 2021-03-10 PROCEDURE — 85018 HEMOGLOBIN: CPT | Performed by: ADVANCED PRACTICE MIDWIFE

## 2021-03-10 ASSESSMENT — MIFFLIN-ST. JEOR: SCORE: 1744.62

## 2021-03-10 NOTE — PROGRESS NOTES
"Subjective:     30 year old  at 37w6d presents for routine prenatal visit.   no vaginal bleeding or leakage of fluid.  +Irregular contractions.  + fetal movement.      No HA, visual changes, RUQ or epigastric pain.   No s/s of COVID, wearing a mask.     Patient concerns:   Feeling well overall.  - lost mucus plug last week, now just discharge. No concerns for leakage of fluid.   - last IOL was for fetal distress, plans to start doing daily fetal movement counts.   - Prefers MD care in labor  - Desires SVE today.    - Aware of GBS positive, open to IV PCN in labor.   - Open to Iron check today, prefers POCT Hemoglobin instead of full CBC at lab.      Objective:  Vitals:    03/10/21 1010   BP: 102/69   BP Location: Left arm   Patient Position: Chair   Pulse: 87   Weight: 97 kg (213 lb 14.4 oz)   Height: 1.753 m (5' 9\")    See OB flowsheet  1cm/50/-1 posterior, medium    Assessment/Plan     Encounter Diagnoses   Name Primary?     Encounter for supervision of normal pregnancy in multigravida - [facility] MD pt Yes     Excessive fetal growth affecting management of pregnancy in third trimester, single or unspecified fetus      Subclinical hyperthyroidism      GBS (group B Streptococcus carrier), +RV culture, currently pregnant      Anemia during pregnancy in third trimester      Orders Placed This Encounter   Procedures     Hemoglobin POCT       -  Reinforced COVID-19 precautions including: social distancing of at least 6 feet, avoiding gatherings with large numbers of people, frequent hand hygiene, avoiding discretionary travel, avoiding touching of face, and cleaning and disinfecting frequently touched surfaces daily.  Encouraged household members to follow the same guidelines.    - If suspected exposure to COVID-19 or onset of fever and symptoms, such as cough or difficulty breathing visit [email or web address] promptly to be directed to the appropriate care and, if pregnant, call [phone] to notify your healthcare team. "       - Reinforced current hospital policy restricting visitors to one healthy adult (age >18) on labor and delivery, postpartum, and in the NICU.   - Reviewed routine COVID testing at time of hospital admission or just prior to scheduled procedure including induction of labor or . Discussed need for patient and partner to wear mask at hospital while staff is in the room. Reviewed option to use of nitrous oxide and have waterbirth if COVID negative.     - Reviewed why/how to contact provider if headache/visual changes/RUQ or epigastric pain, decreased fetal movement, vaginal bleeding, leakage of fluid or strong/regular contractions.   Patient education/orders or handouts today:Sign/symptoms of labor and When to call for labor or other concerns  Return to clinic in 1 week and prn if questions or concerns.  - scheduled already [facility] Dr Milan Early, JANELLE RAMM

## 2021-03-12 ENCOUNTER — HOSPITAL ENCOUNTER (EMERGENCY)
Facility: CLINIC | Age: 31
Discharge: HOME | End: 2021-03-12
Attending: EMERGENCY MEDICINE | Admitting: EMERGENCY MEDICINE
Payer: COMMERCIAL

## 2021-03-12 ENCOUNTER — NURSE TRIAGE (OUTPATIENT)
Dept: NURSING | Facility: CLINIC | Age: 31
End: 2021-03-12

## 2021-03-12 ENCOUNTER — APPOINTMENT (OUTPATIENT)
Dept: ULTRASOUND IMAGING | Facility: CLINIC | Age: 31
End: 2021-03-12
Attending: EMERGENCY MEDICINE
Payer: COMMERCIAL

## 2021-03-12 ENCOUNTER — APPOINTMENT (OUTPATIENT)
Dept: GENERAL RADIOLOGY | Facility: CLINIC | Age: 31
End: 2021-03-12
Attending: EMERGENCY MEDICINE
Payer: COMMERCIAL

## 2021-03-12 VITALS
WEIGHT: 213 LBS | BODY MASS INDEX: 31.45 KG/M2 | SYSTOLIC BLOOD PRESSURE: 124 MMHG | OXYGEN SATURATION: 98 % | RESPIRATION RATE: 16 BRPM | DIASTOLIC BLOOD PRESSURE: 77 MMHG | HEART RATE: 90 BPM | TEMPERATURE: 99.1 F

## 2021-03-12 DIAGNOSIS — O99.891 MATERNAL BLOOD TRANSFUSION: ICD-10-CM

## 2021-03-12 DIAGNOSIS — M79.671 RIGHT FOOT PAIN: ICD-10-CM

## 2021-03-12 DIAGNOSIS — M79.661 PAIN OF RIGHT LOWER LEG: ICD-10-CM

## 2021-03-12 DIAGNOSIS — Z3A.38 38 WEEKS GESTATION OF PREGNANCY: ICD-10-CM

## 2021-03-12 PROCEDURE — 99284 EMERGENCY DEPT VISIT MOD MDM: CPT | Mod: 25

## 2021-03-12 PROCEDURE — 250N000013 HC RX MED GY IP 250 OP 250 PS 637: Performed by: EMERGENCY MEDICINE

## 2021-03-12 PROCEDURE — 93971 EXTREMITY STUDY: CPT | Mod: RT

## 2021-03-12 PROCEDURE — 73630 X-RAY EXAM OF FOOT: CPT | Mod: RT

## 2021-03-12 PROCEDURE — 99284 EMERGENCY DEPT VISIT MOD MDM: CPT | Performed by: EMERGENCY MEDICINE

## 2021-03-12 RX ORDER — ACETAMINOPHEN 325 MG/1
975 TABLET ORAL ONCE
Status: COMPLETED | OUTPATIENT
Start: 2021-03-12 | End: 2021-03-12

## 2021-03-12 RX ORDER — CYCLOBENZAPRINE HCL 10 MG
10 TABLET ORAL 3 TIMES DAILY PRN
Qty: 30 TABLET | Refills: 1 | Status: SHIPPED | OUTPATIENT
Start: 2021-03-12 | End: 2021-04-29

## 2021-03-12 RX ADMIN — ACETAMINOPHEN 975 MG: 325 TABLET, FILM COATED ORAL at 02:10

## 2021-03-12 ASSESSMENT — ENCOUNTER SYMPTOMS
ABDOMINAL PAIN: 0
MYALGIAS: 1
SHORTNESS OF BREATH: 0
FEVER: 0

## 2021-03-12 NOTE — ED PROVIDER NOTES
History     Chief Complaint   Patient presents with     Foot Pain     Root foot pain moving up leg. Intermittent leg pain on calf.     HPI  Jenae Renee is a 30 year old female who presents to us with a chief complaint of right foot and right calf pain.  The pain came on suddenly while the patient was laying her bed while on her phone.  She notes no obvious trauma and has been walking on it without issues previously.  No previous symptoms like this.  Patient is 38 weeks pregnant.  No history of blood clots.  No other known medical issues.    I have reviewed the Medications, Allergies, Past Medical and Surgical History, and Social History in the [employer] system.  PAST MEDICAL HISTORY:   Past Medical History:   Diagnosis Date     Ulcer, gastric, acute     Duodenal ulcer.       PAST SURGICAL HISTORY: History reviewed. No pertinent surgical history.    Past medical history, past surgical history, medications, and allergies were reviewed with the patient. Additional pertinent items: None    FAMILY HISTORY:   Family History   Problem Relation Age of Onset     Diabetes Father      Hypertension Father        SOCIAL HISTORY:   Social History     Tobacco Use     Smoking status: Never Smoker     Smokeless tobacco: Never Used   Substance Use Topics     Alcohol use: No     Social history was reviewed with the patient. Additional pertinent items: None          Discharge Medication List as of 3/12/2021  3:40 AM      START taking these medications    Details   cyclobenzaprine (FLEXERIL) 10 MG tablet Take 1 tablet (10 mg) by mouth 3 times daily as needed for muscle spasms, Disp-30 tablet, R-1, Local Print         CONTINUE these medications which have NOT CHANGED    Details   ferrous sulfate (FEROSUL) 325 (65 Fe) MG tablet Take 1 tablet (325 mg) by mouth daily (with breakfast), Disp-60 tablet, R-1, E-Prescribe      omeprazole (PRILOSEC) 20 MG DR capsule Take 1 capsule (20 mg) by mouth daily, Disp-90 capsule, R-3, E-Prescribe       Prenatal Vit-Fe Fumarate-FA (PRENATAL VITAMIN PO) Take 1 tablet by mouth daily, Historical      ferrous gluconate (FERGON) 324 (38 Fe) MG tablet TAKE 1 TABLET(324 MG) BY MOUTH THREE TIMES DAILY WITH MEALS, Historical      ondansetron (ZOFRAN) 4 MG tablet Take 1 tablet (4 mg) by mouth every 8 hours as needed for nausea or vomiting, Disp-30 tablet,R-1, E-Prescribe      SENNA-docusate sodium (SENNA S) 8.6-50 MG tablet Take 1-2 tablets by mouth 2 times daily as needed (conmstipation), Disp-60 tablet,R-3, E-Prescribe                Allergies   Allergen Reactions     Nsaids Other (See Comments)     States she had a duodenal ulcer and her MD told her never to take NSAIDS.        Review of Systems   Constitutional: Negative for fever.   Respiratory: Negative for shortness of breath.    Cardiovascular: Negative for chest pain.   Gastrointestinal: Negative for abdominal pain.   Musculoskeletal: Positive for myalgias.   All other systems reviewed and are negative.      Physical Exam   BP: 124/77  Pulse: 90  Temp: 99.1  F (37.3  C)  Resp: 16  Weight: 96.6 kg (213 lb)  SpO2: 98 %      Physical Exam  Vitals signs and nursing note reviewed.   Constitutional:       General: She is not in acute distress.     Appearance: She is well-developed.   HENT:      Head: Normocephalic.   Eyes:      Extraocular Movements: Extraocular movements intact.   Neck:      Musculoskeletal: Neck supple.   Pulmonary:      Effort: No respiratory distress.   Abdominal:      Comments: Gravid uterus consistent with stated pregnancy status   Musculoskeletal:         General: No deformity.      Comments: Mild right calf tenderness, distal right foot tenderness.  Pain on active and passive range of motion of the ankle joint.  Mild erythema of the foot.  Pulses normal intact   Skin:     General: Skin is dry.   Neurological:      Mental Status: She is alert.      Comments: alert   Psychiatric:         Behavior: Behavior normal.         ED Course         Procedures           Results for orders placed or performed during the hospital encounter of 03/12/21 (from the past 24 hour(s))   US Lower Extremity Venous Duplex Right    Narrative    EXAM: US LOWER EXTREMITY VENOUS DUPLEX RIGHT  LOCATION: [facility]  DATE/TIME: 3/12/2021 2:30 AM    INDICATION: Calf and foot pain. Pregnancy.  COMPARISON: None.  TECHNIQUE: Venous Duplex ultrasound of the right lower extremity with and without compression, augmentation and duplex. Color flow and spectral Doppler with waveform analysis performed.    FINDINGS: Exam includes the common femoral, femoral, popliteal, and contralateral common femoral veins as well as segmentally visualized deep calf veins and greater saphenous vein.     RIGHT: No deep vein thrombosis. No superficial thrombophlebitis. No popliteal cyst.      Impression    IMPRESSION:  1.  No deep venous thrombosis in the right lower extremity.   XR Foot Right G/E 3 Views    Narrative    EXAM: XR FOOT RT G/E 3 VW  LOCATION: [facility]  DATE/TIME: 3/12/2021 2:50 AM    INDICATION: Foot pain on dorsiflexion.  COMPARISON: None.      Impression    IMPRESSION: Normal joint spaces and alignment. No fracture.     Medications   acetaminophen (TYLENOL) tablet 975 mg (975 mg Oral Given 3/12/21 0210)      Results for orders placed or performed during the hospital encounter of 03/12/21   US Lower Extremity Venous Duplex Right     Status: None    Narrative    EXAM: US LOWER EXTREMITY VENOUS DUPLEX RIGHT  LOCATION: [facility]  DATE/TIME: 3/12/2021 2:30 AM    INDICATION: Calf and foot pain. Pregnancy.  COMPARISON: None.  TECHNIQUE: Venous Duplex ultrasound of the right lower extremity with and without compression, augmentation and duplex. Color flow and spectral Doppler with waveform analysis performed.    FINDINGS: Exam includes the common femoral, femoral, popliteal, and contralateral common femoral veins as well as segmentally visualized deep  calf veins and greater saphenous vein.     RIGHT: No deep vein thrombosis. No superficial thrombophlebitis. No popliteal cyst.      Impression    IMPRESSION:  1.  No deep venous thrombosis in the right lower extremity.   XR Foot Right G/E 3 Views     Status: None    Narrative    EXAM: XR FOOT RT G/E 3 VW  LOCATION: [facility]  DATE/TIME: 3/12/2021 2:50 AM    INDICATION: Foot pain on dorsiflexion.  COMPARISON: None.      Impression    IMPRESSION: Normal joint spaces and alignment. No fracture.            Assessments & Plan (with Medical Decision Making)   30-year-old female presents to us with a chief complaint of foot pain.  Differential includes but not limited to DVT, musculoskeletal injury, muscle spasm.  X-ray and ultrasound were unremarkable today.  Pain was somewhat improved with Tylenol.  We will give the patient prescription for Flexeril.  Recommend she follow-up with primary care and return to us as needed    I have reviewed the nursing notes.    I have reviewed the findings, diagnosis, plan and need for follow up with the patient.    Discharge Medication List as of 3/12/2021  3:40 AM      START taking these medications    Details   cyclobenzaprine (FLEXERIL) 10 MG tablet Take 1 tablet (10 mg) by mouth 3 times daily as needed for muscle spasms, Disp-30 tablet, R-1, Local Print             Final diagnoses:   Right foot pain       3/12/2021   [facility] EMERGENCY DEPARTMENT     Farshad Sullivan,   03/13/21 0015

## 2021-03-12 NOTE — TELEPHONE ENCOUNTER
38w1d pregnant   Was seeing the Midwives, but has recently changed to the doctor team  Planning to deliver at [place]   2nd baby    Patient called and states that she has been having cramps in her right leg recently    However, 2 hours ago, she started getting pain on inside of right foot that was intense.     When she looked at the area there was Swelling and redness. There was also a golf ball size lump.     Felt like a cramp that would go away but instead it keeps getting worse and worse  -Patient had her sister Massage it, but that only made everything worse    Now whole foot is swollen and red. It is constantly getting bigger, becoming more painful and even seems to be changing colors  -color is now changing to more of a purple    Currently rates pain 5/10 at rest, but when tries to stand it is 10/10 and now cannot stand or walk on that foot  -unable to put any pressure on it    Tried massage, ice, and tylenol  -it only keeps getting worse    No fever  No injury to foot or leg  No wound  No difficulty breathing  No chest pain  No abdominal pain  No contractions  No vaginal bleeding  No leaking of fluid  Baby is moving well     Triaged to a disposition of Go to ED. Patient is agreeable.     COVID 19 Nurse Triage Plan/Patient Instructions    Please be aware that novel coronavirus (COVID-19) may be circulating in the community. If you develop symptoms such as fever, cough, or SOB or if you have concerns about the presence of another infection including coronavirus (COVID-19), please contact your health care provider or visit [email or web address].     Disposition/Instructions    ED Visit recommended. Follow protocol based instructions.     Bring Your Own Device:  Please also bring your smart device(s) (smart phones, tablets, laptops) and their charging cables for your personal use and to communicate with your care team during your visit.    Thank you for taking steps to prevent the spread of this  virus.  o Limit your contact with others.  o Wear a simple mask to cover your cough.  o Wash your hands well and often.    Resources    [facility]: About COVID-19: [email or web address]    CDC: What to Do If You're Sick: www.cdc.gov/coronavirus/2019-ncov/about/steps-when-sick.html    CDC: Ending Home Isolation: www.cdc.gov/coronavirus/2019-ncov/hcp/disposition-in-home-patients.html     CDC: Caring for Someone: www.cdc.gov/coronavirus/2019-ncov/if-you-are-sick/care-for-someone.html     [facility]: Interim Guidance for Hospital Discharge to Home: [email or web address]/coronavirus/hcp/hospdischarge.pdf    [facility] clinical trials (COVID-19 research studies): [email or web address]-clinical-trials     Below are the COVID-19 hotlines at the [place] Department of Health ([facility]). Interpreters are available.   o For health questions: Call 509-039-0949 or 1-225.539.3072 (7 a.m. to 7 p.m.)  o For questions about schools and childcare: Call 985-976-6502 or 1-914.644.9155 (7 a.m. to 7 p.m.)     Reason for Disposition    Unable to walk    Purple or black skin on foot or toe    Additional Information    Negative: Sounds like a life-threatening emergency to the triager    Negative: Followed a leg injury    Negative: Leg swelling is main symptom    Negative: Back pain radiating (shooting) into leg(s)    Negative: Knee pain is main symptom    Negative: Ankle pain is main symptom    Negative: Chest pain    Negative: Difficulty breathing    Negative: Followed a foot injury    Negative: Diabetes    Negative: Ankle pain is main symptom    Negative: Thigh or calf pain is main symptom    Negative: Entire foot is cool or blue in comparison to other foot    Protocols used: PREGNANCY - LEG PAIN-A-AH, FOOT PAIN-A-AH    Maura Garrett RN on 3/12/2021 at 12:52 AM

## 2021-03-17 ENCOUNTER — HOSPITAL ENCOUNTER (OUTPATIENT)
Facility: CLINIC | Age: 31
End: 2021-03-17
Admitting: OBSTETRICS & GYNECOLOGY
Payer: COMMERCIAL

## 2021-03-17 ENCOUNTER — HOSPITAL ENCOUNTER (OUTPATIENT)
Facility: CLINIC | Age: 31
Discharge: HOME | End: 2021-03-17
Attending: OBSTETRICS & GYNECOLOGY | Admitting: OBSTETRICS & GYNECOLOGY
Payer: COMMERCIAL

## 2021-03-17 ENCOUNTER — OFFICE VISIT (OUTPATIENT)
Dept: OBGYN | Facility: CLINIC | Age: 31
End: 2021-03-17
Attending: OBSTETRICS & GYNECOLOGY
Payer: COMMERCIAL

## 2021-03-17 VITALS — SYSTOLIC BLOOD PRESSURE: 126 MMHG | DIASTOLIC BLOOD PRESSURE: 65 MMHG | TEMPERATURE: 98.7 F | RESPIRATION RATE: 16 BRPM

## 2021-03-17 VITALS
BODY MASS INDEX: 31.45 KG/M2 | DIASTOLIC BLOOD PRESSURE: 76 MMHG | HEART RATE: 101 BPM | WEIGHT: 213 LBS | SYSTOLIC BLOOD PRESSURE: 114 MMHG

## 2021-03-17 DIAGNOSIS — Z34.80 ENCOUNTER FOR SUPERVISION OF NORMAL PREGNANCY IN MULTIGRAVIDA: Primary | ICD-10-CM

## 2021-03-17 PROBLEM — Z36.89 ENCOUNTER FOR TRIAGE IN PREGNANT PATIENT: Status: ACTIVE | Noted: 2021-03-17

## 2021-03-17 PROCEDURE — 59025 FETAL NON-STRESS TEST: CPT | Mod: 26 | Performed by: OBSTETRICS & GYNECOLOGY

## 2021-03-17 PROCEDURE — 99207 PR PRENATAL VISIT: CPT | Performed by: OBSTETRICS & GYNECOLOGY

## 2021-03-17 PROCEDURE — G0463 HOSPITAL OUTPT CLINIC VISIT: HCPCS

## 2021-03-17 PROCEDURE — 99212 OFFICE O/P EST SF 10 MIN: CPT | Mod: 25 | Performed by: OBSTETRICS & GYNECOLOGY

## 2021-03-17 RX ORDER — ASPIRIN 81 MG
100 TABLET, DELAYED RELEASE (ENTERIC COATED) ORAL DAILY
COMMUNITY
Start: 2021-03-07 | End: 2021-04-29

## 2021-03-17 ASSESSMENT — PAIN SCALES - GENERAL: PAINLEVEL: NO PAIN (0)

## 2021-03-17 NOTE — LETTER
3/17/2021       RE: Jenae Renee  [address]  [place] [address]     Dear Colleague,    Thank you for referring your patient, Jenae Renee, to the [facility] [place] at [facility]. Please see a copy of my visit note below.    Fetal movement is decreased over past 2 days. Today notes 3-4 movements per last several hours, even after eating.  No ctx, LOF or bleeding.     /76   Pulse 101   Wt 96.6 kg (213 lb)   LMP 2020   Breastfeeding No   BMI 31.45 kg/m    See flow    A/p: 31 yo  at 38+ 6wks, decreased FM.     1. PNC utd. GBS +     2. Decrease FM.  To L and D for NST now. Charge nurse aware.    3. If still pregnant, f/u weekly until delivery.    Livier Yates MD, FACOG  (she/her/hers)    Department of Ob/Gyn/Women's Health  [facility]  [facility]  [address] [place] [address]  [email or web address]  p. [phone]  f. [phone]

## 2021-03-17 NOTE — PROGRESS NOTES
31yo  at 38+6 weeks presents from clinic for NST due to decreased FM for 2 days  States now feeling movement.  No N, V, F, C, D. No contractions or bleeding  Declines induction today, desires spontaneous labor    O: Temp: 98.7  F (37.1  C) Temp src: Oral BP: 126/65     Resp: 16        Appears well   Gravid c/w DENA  EFM reactive and category I  TOCO rare    A/P:   Decreased fetal movement - improved and normal antepartum testing  RTC one week or prn fetal movement or other concerns    Jeanie Amezquita MD

## 2021-03-17 NOTE — PROGRESS NOTES
Fetal movement is decreased over past 2 days. Today notes 3-4 movements per last several hours, even after eating.  No ctx, LOF or bleeding.     /76   Pulse 101   Wt 96.6 kg (213 lb)   LMP 2020   Breastfeeding No   BMI 31.45 kg/m    See flow    A/p: 29 yo  at 38+ 6wks, decreased FM.     1. PNC utd. GBS +     2. Decrease FM.  To L and D for NST now. Charge nurse aware.    3. If still pregnant, f/u weekly until delivery.    Livier Yates MD, FACOG  (she/her/hers)    Department of Ob/Gyn/Women's Health  [employer] [place] [employer]  [facility]  [address] [place], [place] [address]. [place] [address]  [email or web address]  p. [phone]  f. [phone]

## 2021-03-17 NOTE — PLAN OF CARE
Dr. Amezquita to bedside to see pt. Discussed reactive NST and OK to discharge home. , accels, no decels. Fetus moving a lot with nurse at bedside. No LOF, bleeding, cramping, back pain, no other complaints. Occas ctx, but not feeling. Gave pt discharge paperwork and pamphlets on kick counts. Pt discharged ambulatory at 1744.

## 2021-03-17 NOTE — NURSING NOTE
Chief Complaint   Patient presents with     Prenatal Care     SALINA 38 weeks and 6 days   Marisa Torrez LPN

## 2021-03-17 NOTE — PLAN OF CARE
Patient presented from clinic for an NST after reporting decreased fetal movement to provider.  Patient escorted to room 462 for triage.  Verbal consent for external monitoring

## 2021-03-19 ENCOUNTER — OFFICE VISIT (OUTPATIENT)
Dept: FAMILY MEDICINE | Facility: CLINIC | Age: 31
End: 2021-03-19
Payer: COMMERCIAL

## 2021-03-19 VITALS
RESPIRATION RATE: 20 BRPM | WEIGHT: 213 LBS | TEMPERATURE: 98 F | HEART RATE: 80 BPM | HEIGHT: 69 IN | DIASTOLIC BLOOD PRESSURE: 58 MMHG | SYSTOLIC BLOOD PRESSURE: 110 MMHG | OXYGEN SATURATION: 98 % | BODY MASS INDEX: 31.55 KG/M2

## 2021-03-19 DIAGNOSIS — R76.0 HIGH SERUM VARICELLA ZOSTER VIRUS (VZV) ANTIBODY TITER: ICD-10-CM

## 2021-03-19 DIAGNOSIS — Z11.1 SCREENING EXAMINATION FOR PULMONARY TUBERCULOSIS: Primary | ICD-10-CM

## 2021-03-19 DIAGNOSIS — Z78.9 HEPATITIS B VACCINATION STATUS UNKNOWN: ICD-10-CM

## 2021-03-19 PROCEDURE — 86481 TB AG RESPONSE T-CELL SUSP: CPT | Performed by: FAMILY MEDICINE

## 2021-03-19 PROCEDURE — 86762 RUBELLA ANTIBODY: CPT | Performed by: FAMILY MEDICINE

## 2021-03-19 PROCEDURE — 86706 HEP B SURFACE ANTIBODY: CPT | Performed by: FAMILY MEDICINE

## 2021-03-19 PROCEDURE — 86787 VARICELLA-ZOSTER ANTIBODY: CPT | Performed by: FAMILY MEDICINE

## 2021-03-19 PROCEDURE — 86735 MUMPS ANTIBODY: CPT | Mod: 90 | Performed by: FAMILY MEDICINE

## 2021-03-19 PROCEDURE — 36415 COLL VENOUS BLD VENIPUNCTURE: CPT | Performed by: FAMILY MEDICINE

## 2021-03-19 PROCEDURE — 99000 SPECIMEN HANDLING OFFICE-LAB: CPT | Performed by: FAMILY MEDICINE

## 2021-03-19 PROCEDURE — 99203 OFFICE O/P NEW LOW 30 MIN: CPT | Performed by: FAMILY MEDICINE

## 2021-03-19 PROCEDURE — 86765 RUBEOLA ANTIBODY: CPT | Performed by: FAMILY MEDICINE

## 2021-03-19 ASSESSMENT — MIFFLIN-ST. JEOR: SCORE: 1732.66

## 2021-03-19 ASSESSMENT — PAIN SCALES - GENERAL: PAINLEVEL: NO PAIN (0)

## 2021-03-19 NOTE — PROGRESS NOTES
"    Assessment & Plan     Screening examination for pulmonary tuberculosis  Unknown status of immunization.  She needs to update her status in order for her to be able to work.  No previous history of tuberculosis, no history of exposure.  Never had a positive test, never been treated  - Quantiferon TB Gold Plus    High serum varicella zoster virus (VZV) antibody titer    - Varicella Zoster Virus Antibody IgG  - Rubeola Antibody IgG  - Mumps Immune Status, IgG  - Rubella Antibody IgG Quantitative    Hepatitis B vaccination status unknown    - Hepatitis B Surface Antibody        BMI:   Estimated body mass index is 31.91 kg/m  as calculated from the following:    Height as of this encounter: 1.74 m (5' 8.5\").    Weight as of this encounter: 96.6 kg (213 lb).   Weight management plan: Discussed healthy diet and exercise guidelines    There are no Patient Instructions on file for this visit.    No follow-ups on file.    Jose Raul San MD  [facility]    Radha Emanuel is a 30 year old who presents for the following health issues  Patient is here for antibodies for TB, Hep B, MMR, DTP, and Varicella     Review of Systems   Constitutional, HEENT, cardiovascular, pulmonary, gi and gu systems are negative, except as otherwise noted.      Objective    LMP 06/18/2020   There is no height or weight on file to calculate BMI.  Physical Exam   GENERAL: healthy, alert and no distress  PSYCH: mentation appears normal, affect normal/bright    Orders Placed This Encounter   Procedures     Varicella Zoster Virus Antibody IgG     Rubeola Antibody IgG     Mumps Immune Status, IgG     Rubella Antibody IgG Quantitative     Hepatitis B Surface Antibody               "

## 2021-03-21 LAB
GAMMA INTERFERON BACKGROUND BLD IA-ACNC: 0.03 IU/ML
M TB IFN-G CD4+ BCKGRND COR BLD-ACNC: 1.45 IU/ML
M TB TUBERC IFN-G BLD QL: NEGATIVE
MITOGEN IGNF BCKGRD COR BLD-ACNC: 0 IU/ML
MITOGEN IGNF BCKGRD COR BLD-ACNC: 0 IU/ML

## 2021-03-22 ENCOUNTER — TELEPHONE (OUTPATIENT)
Dept: FAMILY MEDICINE | Facility: CLINIC | Age: 31
End: 2021-03-22

## 2021-03-22 LAB
HBV SURFACE AB SERPL IA-ACNC: 0.44 M[IU]/ML
MEV IGG SER QL IA: 2.7 AI (ref 0–0.8)
MUV IGG SER QL IA: 1.6 AI (ref 0–0.8)
RUBV IGG SERPL IA-ACNC: 12 IU/ML
VZV IGG SER QL IA: 0.4 AI (ref 0–0.8)

## 2021-03-22 NOTE — LETTER
March 24, 2021      Jenae Renee  [address]   [place] [address]        To Whom It May Concern:    Jenae Renee : had blood tests with Negative for TB screening, negative ventura interferons.  Positive immunity for MMR   Negative Immunity for Varicella and Hep B  Need to have up date immunizations, after she delivery her baby.      Sincerely,        [facility]

## 2021-03-22 NOTE — TELEPHONE ENCOUNTER
Attempt #1 to call patient.     Patient did not answer, RN left voicemail at home number. RN requested patient return call to [facility] at [phone].     Sophia Coelho RN, BSN, PHN  [facility]: [place]    ----- Message from Jose Raul San MD sent at 3/22/2021 12:56 PM CDT -----  Please call pt. Of her lab result  Negative for TB screening  Negative for Varicella and Hep B  She need to have immunizations up dated  2 doses of Varicella, 4 weeks a part  And Hep B 2-3 doses, 4 weeks a part    MMR, positive, Suggested of immunizations  Once she get these vaccinations done, I will fill her form  thanks

## 2021-03-24 ENCOUNTER — OFFICE VISIT (OUTPATIENT)
Dept: OBGYN | Facility: CLINIC | Age: 31
End: 2021-03-24
Attending: OBSTETRICS & GYNECOLOGY
Payer: COMMERCIAL

## 2021-03-24 VITALS
BODY MASS INDEX: 32.47 KG/M2 | SYSTOLIC BLOOD PRESSURE: 116 MMHG | DIASTOLIC BLOOD PRESSURE: 76 MMHG | HEART RATE: 93 BPM | HEIGHT: 69 IN | WEIGHT: 219.2 LBS

## 2021-03-24 DIAGNOSIS — O99.013 ANEMIA DURING PREGNANCY IN THIRD TRIMESTER: Primary | ICD-10-CM

## 2021-03-24 DIAGNOSIS — Z34.80 ENCOUNTER FOR SUPERVISION OF NORMAL PREGNANCY IN MULTIGRAVIDA: ICD-10-CM

## 2021-03-24 PROCEDURE — G0463 HOSPITAL OUTPT CLINIC VISIT: HCPCS

## 2021-03-24 PROCEDURE — 59425 ANTEPARTUM CARE ONLY: CPT | Performed by: OBSTETRICS & GYNECOLOGY

## 2021-03-24 ASSESSMENT — MIFFLIN-ST. JEOR: SCORE: 1760.72

## 2021-03-24 NOTE — CONFIDENTIAL NOTE
"SUBJECTIVE   Seen at L&D on 3/17 for decreased FM, EFM was Cat I and reactive. Since then FM have been consistent (~10/2h).  She is having some contractions, but nothing regular.  Taking oral iron supplements.    denies LOF/vb/preE sx    OBJECTIVE   /76 (BP Location: Right arm, Patient Position: Chair)   Pulse 93   Ht 1.74 m (5' 8.5\")   Wt 99.4 kg (219 lb 3.2 oz)   LMP 2020   BMI 32.84 kg/m      See Ob Flowsheet    ASSESSMENT & PLAN   30 year old  at 39w6d, SALINA.     Late DAYDAY  Suspected LGA  Anemia: last hgb 10.8; repeat Hb today  GBS pos  Labor precautions reviewed     RTC 1 week, with BPP    Carina Marshall MD MPH              "

## 2021-03-24 NOTE — TELEPHONE ENCOUNTER
Routing to Provider to review and advise.  Willing to write letter    Patients due date is tomorrow and unable to get needed vaccinations.  Requesting letter stating results of lab work were negative and unable to vaccinate until after delivery.       Patient returned call to clinic.  RN reviewed labs results and provider message.  Patient states her due date is tomorrow and she is unable to get vaccinated.  Patient inquiring if provider would write letter indicating results of lab tests and reason patient unable to get vaccinated at this time.  RN stated she would rout request to provider.    Schuyler Vázquez, RN, BSN, PHN  [facility]

## 2021-03-24 NOTE — TELEPHONE ENCOUNTER
Wrote a letter with result  Negative for TB   Positive , immune for MMR  Please print and send to pt.  thanks

## 2021-03-24 NOTE — TELEPHONE ENCOUNTER
Attempt #2 to call patient.     Patient did not answer, RN left voicemail at home number. RN requested patient return call to [facility] at [phone].     Sophia Coelho RN, BSN, PHN  [facility]: [place]

## 2021-03-24 NOTE — LETTER
"3/24/2021       RE: Jenae Renee  [address]  [place] [address]     Dear Colleague,    Thank you for referring your patient, Jenae Renee, to the [facility] [place] at [facility]. Please see a copy of my visit note below.    SUBJECTIVE   Feeling pretty good, occ ctx  Less FM overall (compared to 2 weeks ago) but kick counts ok  denies LOF/vb/preE sx    OBJECTIVE   /76 (BP Location: Right arm, Patient Position: Chair)   Pulse 93   Ht 1.74 m (5' 8.5\")   Wt 99.4 kg (219 lb 3.2 oz)   LMP 2020   BMI 32.84 kg/m      See Ob Flowsheet  3, post, soft    ASSESSMENT & PLAN   30 year old  at 39w6d, SALINA.    Late DAYDAY  COVID + first trimester, no residual sx  Suspected LGA  Anemia: last hgb 10.8  GBS pos  Labor precautions reviewed, strict FM precautions (declined L&D for NST today)    IOL scheduled today, 3/31/21 at 8 pm (pt aware) (Wed preferred v. )    Carina Marshall MD MPH  "

## 2021-03-24 NOTE — PROGRESS NOTES
"SUBJECTIVE   Feeling pretty good, occ ctx  Less FM overall (compared to 2 weeks ago) but kick counts ok  denies LOF/vb/preE sx    OBJECTIVE   /76 (BP Location: Right arm, Patient Position: Chair)   Pulse 93   Ht 1.74 m (5' 8.5\")   Wt 99.4 kg (219 lb 3.2 oz)   LMP 2020   BMI 32.84 kg/m      See Ob Flowsheet  3, post, soft    ASSESSMENT & PLAN   30 year old  at 39w6d, SALINA.    Late DAYDAY  COVID + first trimester, no residual sx  Suspected LGA  Anemia: last hgb 10.8  GBS pos  Labor precautions reviewed, strict FM precautions (declined L&D for NST today)    IOL scheduled today, 3/31/21 at 8 pm (pt aware) (Wed preferred v. )    Carina Marshall MD MPH              "

## 2021-03-29 DIAGNOSIS — Z34.80 ENCOUNTER FOR SUPERVISION OF NORMAL PREGNANCY IN MULTIGRAVIDA: ICD-10-CM

## 2021-03-29 LAB
SARS-COV-2 RNA RESP QL NAA+PROBE: NORMAL
SPECIMEN SOURCE: NORMAL

## 2021-03-29 PROCEDURE — U0005 INFEC AGEN DETEC AMPLI PROBE: HCPCS | Performed by: OBSTETRICS & GYNECOLOGY

## 2021-03-29 PROCEDURE — U0003 INFECTIOUS AGENT DETECTION BY NUCLEIC ACID (DNA OR RNA); SEVERE ACUTE RESPIRATORY SYNDROME CORONAVIRUS 2 (SARS-COV-2) (CORONAVIRUS DISEASE [COVID-19]), AMPLIFIED PROBE TECHNIQUE, MAKING USE OF HIGH THROUGHPUT TECHNOLOGIES AS DESCRIBED BY CMS-2020-01-R: HCPCS | Performed by: OBSTETRICS & GYNECOLOGY

## 2021-03-30 ENCOUNTER — HOSPITAL ENCOUNTER (OUTPATIENT)
Facility: CLINIC | Age: 31
End: 2021-03-30
Attending: OBSTETRICS & GYNECOLOGY | Admitting: OBSTETRICS & GYNECOLOGY
Payer: COMMERCIAL

## 2021-03-30 ENCOUNTER — HOSPITAL ENCOUNTER (INPATIENT)
Facility: CLINIC | Age: 31
LOS: 3 days | Discharge: HOME | End: 2021-04-02
Attending: OBSTETRICS & GYNECOLOGY | Admitting: OBSTETRICS & GYNECOLOGY
Payer: COMMERCIAL

## 2021-03-30 ENCOUNTER — NURSE TRIAGE (OUTPATIENT)
Dept: NURSING | Facility: CLINIC | Age: 31
End: 2021-03-30

## 2021-03-30 LAB
ERYTHROCYTE [DISTWIDTH] IN BLOOD BY AUTOMATED COUNT: 15.1 % (ref 10–15)
HCT VFR BLD AUTO: 37.4 % (ref 35–47)
HGB BLD-MCNC: 11.7 G/DL (ref 11.7–15.7)
LABORATORY COMMENT REPORT: NORMAL
MCH RBC QN AUTO: 29 PG (ref 26.5–33)
MCHC RBC AUTO-ENTMCNC: 31.3 G/DL (ref 31.5–36.5)
MCV RBC AUTO: 93 FL (ref 78–100)
PLATELET # BLD AUTO: 212 10E9/L (ref 150–450)
RBC # BLD AUTO: 4.04 10E12/L (ref 3.8–5.2)
SARS-COV-2 RNA RESP QL NAA+PROBE: NEGATIVE
SPECIMEN SOURCE: NORMAL
WBC # BLD AUTO: 9.3 10E9/L (ref 4–11)

## 2021-03-30 PROCEDURE — 86780 TREPONEMA PALLIDUM: CPT | Performed by: STUDENT IN AN ORGANIZED HEALTH CARE EDUCATION/TRAINING PROGRAM

## 2021-03-30 PROCEDURE — 85027 COMPLETE CBC AUTOMATED: CPT | Performed by: STUDENT IN AN ORGANIZED HEALTH CARE EDUCATION/TRAINING PROGRAM

## 2021-03-30 PROCEDURE — 120N000002 HC R&B MED SURG/OB UMMC

## 2021-03-30 PROCEDURE — 86900 BLOOD TYPING SEROLOGIC ABO: CPT | Performed by: STUDENT IN AN ORGANIZED HEALTH CARE EDUCATION/TRAINING PROGRAM

## 2021-03-30 PROCEDURE — 36415 COLL VENOUS BLD VENIPUNCTURE: CPT | Performed by: STUDENT IN AN ORGANIZED HEALTH CARE EDUCATION/TRAINING PROGRAM

## 2021-03-30 PROCEDURE — G0463 HOSPITAL OUTPT CLINIC VISIT: HCPCS | Mod: 25

## 2021-03-30 PROCEDURE — 86901 BLOOD TYPING SEROLOGIC RH(D): CPT | Performed by: STUDENT IN AN ORGANIZED HEALTH CARE EDUCATION/TRAINING PROGRAM

## 2021-03-30 PROCEDURE — 86850 RBC ANTIBODY SCREEN: CPT | Performed by: STUDENT IN AN ORGANIZED HEALTH CARE EDUCATION/TRAINING PROGRAM

## 2021-03-30 RX ORDER — SODIUM CHLORIDE, SODIUM LACTATE, POTASSIUM CHLORIDE, CALCIUM CHLORIDE 600; 310; 30; 20 MG/100ML; MG/100ML; MG/100ML; MG/100ML
INJECTION, SOLUTION INTRAVENOUS CONTINUOUS
Status: DISCONTINUED | OUTPATIENT
Start: 2021-03-30 | End: 2021-03-31

## 2021-03-30 RX ORDER — OXYCODONE AND ACETAMINOPHEN 5; 325 MG/1; MG/1
1 TABLET ORAL
Status: DISCONTINUED | OUTPATIENT
Start: 2021-03-30 | End: 2021-03-31

## 2021-03-30 RX ORDER — PENICILLIN G POTASSIUM 5000000 [IU]/1
5 INJECTION, POWDER, FOR SOLUTION INTRAMUSCULAR; INTRAVENOUS ONCE
Status: COMPLETED | OUTPATIENT
Start: 2021-03-30 | End: 2021-03-31

## 2021-03-30 RX ORDER — PROCHLORPERAZINE 25 MG
25 SUPPOSITORY, RECTAL RECTAL EVERY 12 HOURS PRN
Status: DISCONTINUED | OUTPATIENT
Start: 2021-03-30 | End: 2021-03-31

## 2021-03-30 RX ORDER — CARBOPROST TROMETHAMINE 250 UG/ML
250 INJECTION, SOLUTION INTRAMUSCULAR
Status: DISCONTINUED | OUTPATIENT
Start: 2021-03-30 | End: 2021-03-31

## 2021-03-30 RX ORDER — ONDANSETRON 2 MG/ML
4 INJECTION INTRAMUSCULAR; INTRAVENOUS EVERY 6 HOURS PRN
Status: DISCONTINUED | OUTPATIENT
Start: 2021-03-30 | End: 2021-03-31

## 2021-03-30 RX ORDER — NALOXONE HYDROCHLORIDE 0.4 MG/ML
0.4 INJECTION, SOLUTION INTRAMUSCULAR; INTRAVENOUS; SUBCUTANEOUS
Status: DISCONTINUED | OUTPATIENT
Start: 2021-03-30 | End: 2021-03-31

## 2021-03-30 RX ORDER — FENTANYL CITRATE 50 UG/ML
50-100 INJECTION, SOLUTION INTRAMUSCULAR; INTRAVENOUS
Status: DISCONTINUED | OUTPATIENT
Start: 2021-03-30 | End: 2021-03-31

## 2021-03-30 RX ORDER — OXYTOCIN 10 [USP'U]/ML
10 INJECTION, SOLUTION INTRAMUSCULAR; INTRAVENOUS
Status: DISCONTINUED | OUTPATIENT
Start: 2021-03-30 | End: 2021-03-31

## 2021-03-30 RX ORDER — METHYLERGONOVINE MALEATE 0.2 MG/ML
200 INJECTION INTRAVENOUS
Status: DISCONTINUED | OUTPATIENT
Start: 2021-03-30 | End: 2021-03-31

## 2021-03-30 RX ORDER — NALOXONE HYDROCHLORIDE 0.4 MG/ML
0.2 INJECTION, SOLUTION INTRAMUSCULAR; INTRAVENOUS; SUBCUTANEOUS
Status: DISCONTINUED | OUTPATIENT
Start: 2021-03-30 | End: 2021-03-31

## 2021-03-30 RX ORDER — OXYTOCIN/0.9 % SODIUM CHLORIDE 30/500 ML
100-340 PLASTIC BAG, INJECTION (ML) INTRAVENOUS CONTINUOUS PRN
Status: COMPLETED | OUTPATIENT
Start: 2021-03-30 | End: 2021-03-31

## 2021-03-30 RX ORDER — ACETAMINOPHEN 325 MG/1
650 TABLET ORAL EVERY 4 HOURS PRN
Status: DISCONTINUED | OUTPATIENT
Start: 2021-03-30 | End: 2021-03-31

## 2021-03-30 ASSESSMENT — ACTIVITIES OF DAILY LIVING (ADL)
FALL_HISTORY_WITHIN_LAST_SIX_MONTHS: NO
TOILETING_ISSUES: NO

## 2021-03-30 ASSESSMENT — MIFFLIN-ST. JEOR: SCORE: 1740.54

## 2021-03-30 NOTE — TELEPHONE ENCOUNTER
Triage Call: OB Triage Call    Reason for call: Back pain with mild bleeding    Assessment: 40w5d, 2nd pregnancy, constant 6-7/10 back pain, mild vaginal bleeding, pink mucus noted, 10 kicks/movements in 30 minutes but feels like baby is moving less today, back pain started about 40 minutes ago along with the bleeding. Pain becomes more sever when up and walking.     Plan: ED/L&D    Patient plans to deliver at [facility]  Patient's primary OB Provider is Craina Marshall.    Patient's primary OB provider is a Physician.  Labor and delivery at [facility] ([phone]) notified of patient's pending arrival.  Report given to Rachell.      40w5d  Estimated Date of Delivery: Mar 25, 2021        OB History    Para Term  AB Living   4 1 1 0 2 1   SAB TAB Ectopic Multiple Live Births   2 0 0 0 1      # Outcome Date GA Lbr Usman/2nd Weight Sex Delivery Anes PTL Lv   4 Current            3 2020           2 Term 19 39w0d 04:59 / 00:38 2.76 kg (6 lb 1.4 oz) F Vag-Spont EPI N KELECHI      Name: Mitzi       Apgar1: 9  Apgar5: 9   1 SAB  7w0d             Obstetric Comments   IOL due to abnormal FHT cat 2  Baby did well        Lab Results   Component Value Date    GBS Positive (A) 2021        Yudith Merlos, RN Nursing Advisor 3/30/2021 6:42 PM     Reason for Disposition    MILD-MODERATE vaginal bleeding (i.e., small to medium clots; like mild menstrual period)    Additional Information    Negative: Shock suspected (e.g., cold/pale/clammy skin, too weak to stand, low BP, rapid pulse)    Negative: SEVERE abdominal pain    Negative: Sounds like a life-threatening emergency to the triager    Negative: Major injury to the back (e.g., MVA, fall > 10 feet or 3 meters, penetrating injury, etc.)    Negative: Followed a tailbone injury    Negative: [1] Having contractions or other symptoms of labor AND [2] >= 37 weeks pregnant (i.e., term pregnancy)    Negative: [1] Having contractions or other symptoms of labor  AND [2] < 37 weeks pregnant (i.e., )    Negative: [1] Abdominal pain AND [2] pregnant > 20 weeks    Negative: [1] Abdominal pain AND [2] pregnant < 20 weeks    Negative: [1] Pain in the upper back AND [2] overlies the rib cage    Negative: [1] Pain in the upper back AND [2] worsened by coughing (or clearly increases with breathing)    Negative: [1] Unable to urinate (or only a few drops) > 4 hours AND     [2] bladder feels very full (e.g., palpable bladder or strong urge to urinate)    Negative: Numbness in groin or rectal area (i.e., loss of sensation)    Negative: Weakness of a leg or foot (e.g., unable to bear weight, dragging foot)    Negative: Unable to walk    Negative: Patient sounds very sick or weak to the triager    Negative: Passed out (i.e., lost consciousness, collapsed and was not responding)    Negative: Shock suspected (e.g., cold/pale/clammy skin, too weak to stand, low BP, rapid pulse)    Negative: Difficult to awaken or acting confused (e.g., disoriented, slurred speech)    Negative: SEVERE vaginal bleeding (e.g., continuous red blood from vagina, large blood clots)    Negative: [1] SEVERE abdominal pain (e.g., excruciating) AND [2] constant AND [3] present > 1 hour    Negative: Sounds like a life-threatening emergency to the triager    Negative: [1] Vaginal bleeding AND [2] pregnant < 20 weeks    Negative: [1] SEVERE back pain (e.g., excruciating, unable to do any normal activities) AND [2] not improved 2 hours after pain medicine    Negative: [1] Pain radiates into the thigh or further down the leg AND [2] both legs    Negative: [1] Flank pain (i.e., in side, below ribs and above hip) AND [2] fever > 100.5 F (38.1 C)    Negative: Pain or burning with passing urine (urination)    Protocols used: PREGNANCY - VAGINAL BLEEDING GREATER THAN 20 WEEKS EGA-A-, PREGNANCY - BACK PAIN-A-AH

## 2021-03-31 ENCOUNTER — ANESTHESIA (OUTPATIENT)
Dept: OBGYN | Facility: CLINIC | Age: 31
End: 2021-03-31
Payer: COMMERCIAL

## 2021-03-31 ENCOUNTER — ANESTHESIA EVENT (OUTPATIENT)
Dept: OBGYN | Facility: CLINIC | Age: 31
End: 2021-03-31
Payer: COMMERCIAL

## 2021-03-31 ENCOUNTER — TRANSFERRED RECORDS (OUTPATIENT)
Dept: HEALTH INFORMATION MANAGEMENT | Facility: CLINIC | Age: 31
End: 2021-03-31

## 2021-03-31 LAB
ABO + RH BLD: NORMAL
ABO + RH BLD: NORMAL
BLD GP AB SCN SERPL QL: NORMAL
BLOOD BANK CMNT PATIENT-IMP: NORMAL
SPECIMEN EXP DATE BLD: NORMAL
T PALLIDUM AB SER QL: NONREACTIVE

## 2021-03-31 PROCEDURE — 00HU33Z INSERTION OF INFUSION DEVICE INTO SPINAL CANAL, PERCUTANEOUS APPROACH: ICD-10-PCS | Performed by: ANESTHESIOLOGY

## 2021-03-31 PROCEDURE — 120N000002 HC R&B MED SURG/OB UMMC

## 2021-03-31 PROCEDURE — 250N000013 HC RX MED GY IP 250 OP 250 PS 637: Performed by: STUDENT IN AN ORGANIZED HEALTH CARE EDUCATION/TRAINING PROGRAM

## 2021-03-31 PROCEDURE — 250N000009 HC RX 250: Performed by: STUDENT IN AN ORGANIZED HEALTH CARE EDUCATION/TRAINING PROGRAM

## 2021-03-31 PROCEDURE — 250N000011 HC RX IP 250 OP 636: Performed by: STUDENT IN AN ORGANIZED HEALTH CARE EDUCATION/TRAINING PROGRAM

## 2021-03-31 PROCEDURE — 722N000001 HC LABOR CARE VAGINAL DELIVERY SINGLE

## 2021-03-31 PROCEDURE — 59409 OBSTETRICAL CARE: CPT | Performed by: OBSTETRICS & GYNECOLOGY

## 2021-03-31 PROCEDURE — 3E0R3BZ INTRODUCTION OF ANESTHETIC AGENT INTO SPINAL CANAL, PERCUTANEOUS APPROACH: ICD-10-PCS | Performed by: ANESTHESIOLOGY

## 2021-03-31 PROCEDURE — 250N000009 HC RX 250: Performed by: NURSE ANESTHETIST, CERTIFIED REGISTERED

## 2021-03-31 PROCEDURE — 0KQM0ZZ REPAIR PERINEUM MUSCLE, OPEN APPROACH: ICD-10-PCS | Performed by: OBSTETRICS & GYNECOLOGY

## 2021-03-31 PROCEDURE — 10907ZC DRAINAGE OF AMNIOTIC FLUID, THERAPEUTIC FROM PRODUCTS OF CONCEPTION, VIA NATURAL OR ARTIFICIAL OPENING: ICD-10-PCS | Performed by: OBSTETRICS & GYNECOLOGY

## 2021-03-31 PROCEDURE — 258N000003 HC RX IP 258 OP 636: Performed by: STUDENT IN AN ORGANIZED HEALTH CARE EDUCATION/TRAINING PROGRAM

## 2021-03-31 RX ORDER — LIDOCAINE HCL/EPINEPHRINE/PF 2%-1:200K
VIAL (ML) INJECTION PRN
Status: DISCONTINUED | OUTPATIENT
Start: 2021-03-31 | End: 2021-03-31

## 2021-03-31 RX ORDER — LIDOCAINE HYDROCHLORIDE AND EPINEPHRINE 15; 5 MG/ML; UG/ML
INJECTION, SOLUTION EPIDURAL PRN
Status: DISCONTINUED | OUTPATIENT
Start: 2021-03-31 | End: 2021-05-03 | Stop reason: HOSPADM

## 2021-03-31 RX ORDER — NALOXONE HYDROCHLORIDE 0.4 MG/ML
0.4 INJECTION, SOLUTION INTRAMUSCULAR; INTRAVENOUS; SUBCUTANEOUS
Status: DISCONTINUED | OUTPATIENT
Start: 2021-03-31 | End: 2021-04-02 | Stop reason: HOSPADM

## 2021-03-31 RX ORDER — CEFAZOLIN SODIUM 1 G/3ML
1 INJECTION, POWDER, FOR SOLUTION INTRAMUSCULAR; INTRAVENOUS SEE ADMIN INSTRUCTIONS
Status: DISCONTINUED | OUTPATIENT
Start: 2021-03-31 | End: 2021-03-31 | Stop reason: HOSPADM

## 2021-03-31 RX ORDER — AZITHROMYCIN 500 MG/5ML
500 INJECTION, POWDER, LYOPHILIZED, FOR SOLUTION INTRAVENOUS
Status: DISCONTINUED | OUTPATIENT
Start: 2021-03-31 | End: 2021-03-31 | Stop reason: HOSPADM

## 2021-03-31 RX ORDER — OXYTOCIN/0.9 % SODIUM CHLORIDE 30/500 ML
100 PLASTIC BAG, INJECTION (ML) INTRAVENOUS CONTINUOUS
Status: DISCONTINUED | OUTPATIENT
Start: 2021-03-31 | End: 2021-04-02 | Stop reason: HOSPADM

## 2021-03-31 RX ORDER — AMOXICILLIN 250 MG
2 CAPSULE ORAL 2 TIMES DAILY
Status: DISCONTINUED | OUTPATIENT
Start: 2021-03-31 | End: 2021-04-02 | Stop reason: HOSPADM

## 2021-03-31 RX ORDER — LIDOCAINE 40 MG/G
CREAM TOPICAL
Status: DISCONTINUED | OUTPATIENT
Start: 2021-03-31 | End: 2021-03-31

## 2021-03-31 RX ORDER — MODIFIED LANOLIN
OINTMENT (GRAM) TOPICAL
Status: DISCONTINUED | OUTPATIENT
Start: 2021-03-31 | End: 2021-04-02 | Stop reason: HOSPADM

## 2021-03-31 RX ORDER — AMOXICILLIN 250 MG
1 CAPSULE ORAL 2 TIMES DAILY
Status: DISCONTINUED | OUTPATIENT
Start: 2021-03-31 | End: 2021-04-02 | Stop reason: HOSPADM

## 2021-03-31 RX ORDER — ACETAMINOPHEN 325 MG/1
975 TABLET ORAL ONCE
Status: DISCONTINUED | OUTPATIENT
Start: 2021-03-31 | End: 2021-03-31 | Stop reason: HOSPADM

## 2021-03-31 RX ORDER — MISOPROSTOL 200 UG/1
400 TABLET ORAL
Status: DISCONTINUED | OUTPATIENT
Start: 2021-03-31 | End: 2021-04-02 | Stop reason: HOSPADM

## 2021-03-31 RX ORDER — OXYTOCIN/0.9 % SODIUM CHLORIDE 30/500 ML
340 PLASTIC BAG, INJECTION (ML) INTRAVENOUS CONTINUOUS PRN
Status: DISCONTINUED | OUTPATIENT
Start: 2021-03-31 | End: 2021-04-02 | Stop reason: HOSPADM

## 2021-03-31 RX ORDER — HYDROCORTISONE 2.5 %
CREAM (GRAM) TOPICAL 3 TIMES DAILY PRN
Status: DISCONTINUED | OUTPATIENT
Start: 2021-03-31 | End: 2021-04-02 | Stop reason: HOSPADM

## 2021-03-31 RX ORDER — OXYTOCIN/0.9 % SODIUM CHLORIDE 30/500 ML
PLASTIC BAG, INJECTION (ML) INTRAVENOUS
Status: DISCONTINUED
Start: 2021-03-31 | End: 2021-03-31 | Stop reason: HOSPADM

## 2021-03-31 RX ORDER — SODIUM CHLORIDE, SODIUM LACTATE, POTASSIUM CHLORIDE, CALCIUM CHLORIDE 600; 310; 30; 20 MG/100ML; MG/100ML; MG/100ML; MG/100ML
INJECTION, SOLUTION INTRAVENOUS CONTINUOUS
Status: DISCONTINUED | OUTPATIENT
Start: 2021-03-31 | End: 2021-03-31 | Stop reason: HOSPADM

## 2021-03-31 RX ORDER — NALOXONE HYDROCHLORIDE 0.4 MG/ML
0.2 INJECTION, SOLUTION INTRAMUSCULAR; INTRAVENOUS; SUBCUTANEOUS
Status: DISCONTINUED | OUTPATIENT
Start: 2021-03-31 | End: 2021-04-02 | Stop reason: HOSPADM

## 2021-03-31 RX ORDER — FENTANYL CITRATE 50 UG/ML
INJECTION, SOLUTION INTRAMUSCULAR; INTRAVENOUS PRN
Status: DISCONTINUED | OUTPATIENT
Start: 2021-03-31 | End: 2021-05-03 | Stop reason: HOSPADM

## 2021-03-31 RX ORDER — NALBUPHINE HYDROCHLORIDE 10 MG/ML
2.5-5 INJECTION, SOLUTION INTRAMUSCULAR; INTRAVENOUS; SUBCUTANEOUS EVERY 6 HOURS PRN
Status: DISCONTINUED | OUTPATIENT
Start: 2021-03-31 | End: 2021-03-31

## 2021-03-31 RX ORDER — OXYCODONE HYDROCHLORIDE 5 MG/1
5 TABLET ORAL EVERY 6 HOURS PRN
Status: COMPLETED | OUTPATIENT
Start: 2021-03-31 | End: 2021-04-01

## 2021-03-31 RX ORDER — OXYTOCIN/0.9 % SODIUM CHLORIDE 30/500 ML
1-24 PLASTIC BAG, INJECTION (ML) INTRAVENOUS CONTINUOUS
Status: DISCONTINUED | OUTPATIENT
Start: 2021-03-31 | End: 2021-03-31

## 2021-03-31 RX ORDER — FENTANYL/BUPIVACAINE/NS/PF 2-1250MCG
PLASTIC BAG, INJECTION (ML) INJECTION
Status: DISPENSED
Start: 2021-03-31 | End: 2021-03-31

## 2021-03-31 RX ORDER — ACETAMINOPHEN 325 MG/1
650 TABLET ORAL EVERY 4 HOURS PRN
Status: DISCONTINUED | OUTPATIENT
Start: 2021-03-31 | End: 2021-04-02 | Stop reason: HOSPADM

## 2021-03-31 RX ORDER — EPHEDRINE SULFATE 50 MG/ML
5 INJECTION, SOLUTION INTRAMUSCULAR; INTRAVENOUS; SUBCUTANEOUS
Status: DISCONTINUED | OUTPATIENT
Start: 2021-03-31 | End: 2021-03-31

## 2021-03-31 RX ORDER — LIDOCAINE HYDROCHLORIDE 10 MG/ML
INJECTION, SOLUTION EPIDURAL; INFILTRATION; INTRACAUDAL; PERINEURAL
Status: DISCONTINUED
Start: 2021-03-31 | End: 2021-03-31 | Stop reason: HOSPADM

## 2021-03-31 RX ORDER — MISOPROSTOL 200 UG/1
TABLET ORAL
Status: DISCONTINUED
Start: 2021-03-31 | End: 2021-03-31 | Stop reason: HOSPADM

## 2021-03-31 RX ORDER — BISACODYL 10 MG
10 SUPPOSITORY, RECTAL RECTAL DAILY PRN
Status: DISCONTINUED | OUTPATIENT
Start: 2021-04-02 | End: 2021-04-02 | Stop reason: HOSPADM

## 2021-03-31 RX ORDER — CITRIC ACID/SODIUM CITRATE 334-500MG
30 SOLUTION, ORAL ORAL
Status: DISCONTINUED | OUTPATIENT
Start: 2021-03-31 | End: 2021-03-31 | Stop reason: HOSPADM

## 2021-03-31 RX ORDER — CEFAZOLIN SODIUM 2 G/100ML
2 INJECTION, SOLUTION INTRAVENOUS
Status: DISCONTINUED | OUTPATIENT
Start: 2021-03-31 | End: 2021-03-31 | Stop reason: HOSPADM

## 2021-03-31 RX ORDER — OXYTOCIN 10 [USP'U]/ML
INJECTION, SOLUTION INTRAMUSCULAR; INTRAVENOUS
Status: DISCONTINUED
Start: 2021-03-31 | End: 2021-03-31 | Stop reason: HOSPADM

## 2021-03-31 RX ORDER — OXYTOCIN 10 [USP'U]/ML
10 INJECTION, SOLUTION INTRAMUSCULAR; INTRAVENOUS
Status: DISCONTINUED | OUTPATIENT
Start: 2021-03-31 | End: 2021-04-02 | Stop reason: HOSPADM

## 2021-03-31 RX ADMIN — Medication 2 MILLI-UNITS/MIN: at 10:15

## 2021-03-31 RX ADMIN — Medication 340 ML/HR: at 11:51

## 2021-03-31 RX ADMIN — Medication 10 ML/HR: at 01:40

## 2021-03-31 RX ADMIN — SODIUM CHLORIDE, POTASSIUM CHLORIDE, SODIUM LACTATE AND CALCIUM CHLORIDE 1000 ML: 600; 310; 30; 20 INJECTION, SOLUTION INTRAVENOUS at 01:18

## 2021-03-31 RX ADMIN — Medication 5 MG: at 02:27

## 2021-03-31 RX ADMIN — SODIUM CHLORIDE 2.5 MILLION UNITS: 9 INJECTION, SOLUTION INTRAVENOUS at 08:02

## 2021-03-31 RX ADMIN — OXYCODONE HYDROCHLORIDE 5 MG: 5 TABLET ORAL at 19:51

## 2021-03-31 RX ADMIN — LIDOCAINE HYDROCHLORIDE,EPINEPHRINE BITARTRATE 5 ML: 20; .005 INJECTION, SOLUTION EPIDURAL; INFILTRATION; INTRACAUDAL; PERINEURAL at 08:30

## 2021-03-31 RX ADMIN — LIDOCAINE HYDROCHLORIDE,EPINEPHRINE BITARTRATE 5 ML: 20; .005 INJECTION, SOLUTION EPIDURAL; INFILTRATION; INTRACAUDAL; PERINEURAL at 08:22

## 2021-03-31 RX ADMIN — LIDOCAINE HYDROCHLORIDE,EPINEPHRINE BITARTRATE 3 ML: 15; .005 INJECTION, SOLUTION EPIDURAL; INFILTRATION; INTRACAUDAL; PERINEURAL at 01:35

## 2021-03-31 RX ADMIN — LIDOCAINE HYDROCHLORIDE,EPINEPHRINE BITARTRATE 5 ML: 20; .005 INJECTION, SOLUTION EPIDURAL; INFILTRATION; INTRACAUDAL; PERINEURAL at 08:26

## 2021-03-31 RX ADMIN — PENICILLIN G POTASSIUM 5 MILLION UNITS: 5000000 POWDER, FOR SOLUTION INTRAMUSCULAR; INTRAPLEURAL; INTRATHECAL; INTRAVENOUS at 00:30

## 2021-03-31 RX ADMIN — FENTANYL CITRATE 16 MCG: 50 INJECTION, SOLUTION INTRAMUSCULAR; INTRAVENOUS at 01:40

## 2021-03-31 RX ADMIN — Medication: at 08:43

## 2021-03-31 RX ADMIN — ACETAMINOPHEN 650 MG: 325 TABLET, FILM COATED ORAL at 19:00

## 2021-03-31 RX ADMIN — SODIUM CHLORIDE 2.5 MILLION UNITS: 9 INJECTION, SOLUTION INTRAVENOUS at 04:31

## 2021-03-31 RX ADMIN — ACETAMINOPHEN 650 MG: 325 TABLET, FILM COATED ORAL at 23:53

## 2021-03-31 RX ADMIN — Medication 5 MG: at 02:41

## 2021-03-31 RX ADMIN — Medication 10 ML: at 01:40

## 2021-03-31 RX ADMIN — DOCUSATE SODIUM 50MG AND SENNOSIDES 8.6MG 2 TABLET: 8.6; 5 TABLET, FILM COATED ORAL at 19:41

## 2021-03-31 RX ADMIN — ACETAMINOPHEN 650 MG: 325 TABLET, FILM COATED ORAL at 13:12

## 2021-03-31 RX ADMIN — Medication: at 01:58

## 2021-03-31 NOTE — PROGRESS NOTES
Brief Progress Note    Paged by RN that patient is feeling pressure. SVE 7/80/-2. FHT reactive and reassuring, cat I. Will start pitocin at this time given unchanged cervix. Patient agreeable with this plan. Comfortable with epidural.     Temp: 97.7  F (36.5  C) Temp src: Oral BP: 114/69 Pulse: 90   Resp: 16 SpO2: 99 %         Blayne Shanks MD  OB/GYN PGY-2  03/31/21 8:06 AM

## 2021-03-31 NOTE — H&P
L&D History and Physical   2021  Jenae Renee  [id number]    CC: Vaginal bleeding, back pain    HPI:   Jenae Renee is a 30 year old  at 40w5d by LMP c/w 7w0d US who presents for vaginal bleeding and back pain. She has been having cramping back pain since this morning, which has continued into this evening. The pain comes in regular intervals like contractions but does not extend to her abdomen like the contractions she had with her first labor. She had a couple episodes of diarrhea earlier today which have since stopped, also noticed a moderate amount of bloody mucoid discharge around 1400, followed by about 30 minutes of spotting. She had not had any bleeding or change in other symptoms since that time. She reports normal fetal movement. Pt was scheduled for induction on 21 for late term gestation. Pregnancy complicated as below. She denies fever, chills, headache, vision changes, chest pain, shortness of breath, RUQ pain, nausea/vomiting, dysuria, constipation, increased edema.    Her pregnancy has been complicated by:  - GBS +  - Suspected LGA fetus, US at 36 weeks with EFW 3694g (>90%ile; BPD, HC, AC >97%ile)  - Anemia during third trimester, on PO iron  - COVID+ during first trimester per patient report  - Late transfer of care    OBHX:   OB History    Para Term  AB Living   4 1 1 0 2 1   SAB TAB Ectopic Multiple Live Births   2 0 0 0 1      # Outcome Date GA Lbr Usman/2nd Weight Sex Delivery Anes PTL Lv   4 Current            3 2020           2 Term 19 39w0d 04:59 / 00:38 2.76 kg (6 lb 1.4 oz) F Vag-Spont EPI N KELECHI      Name: Mitzi       Apgar1: 9  Apgar5: 9   1 SAB  7w0d             Obstetric Comments   IOL due to abnormal FHT cat 2  Baby did well        Past Medical History:   Diagnosis Date     Ulcer, gastric, acute     Duodenal ulcer.       History reviewed. No pertinent surgical history.    Medications:   No current facility-administered medications on file  "prior to encounter.   docusate sodium (COLACE) 100 MG tablet, Take 100 mg by mouth daily  ferrous sulfate (FEROSUL) 325 (65 Fe) MG tablet, Take 1 tablet (325 mg) by mouth daily (with breakfast)  omeprazole (PRILOSEC) 20 MG DR capsule, Take 1 capsule (20 mg) by mouth daily  Prenatal Vit-Fe Fumarate-FA (PRENATAL VITAMIN PO), Take 1 tablet by mouth daily  cyclobenzaprine (FLEXERIL) 10 MG tablet, Take 1 tablet (10 mg) by mouth 3 times daily as needed for muscle spasms (Patient not taking: Reported on 3/24/2021)      Allergies   Allergen Reactions     Nsaids Other (See Comments)     States she had a duodenal ulcer and her MD told her never to take NSAIDS.       Family History   Problem Relation Age of Onset     Diabetes Father      Hypertension Father        Social History     Tobacco Use     Smoking status: Never Smoker     Smokeless tobacco: Never Used   Substance Use Topics     Alcohol use: No     Drug use: No       ROS: 10-point ROS negative except as indicated in HPI.    Physical Exam:  Vitals:    03/30/21 2150 03/30/21 2154   BP: 118/74    Pulse: 90    Resp: 16    Temp: 98.6  F (37  C)    TempSrc: Oral    Weight:  96.6 kg (213 lb)   Height:  1.753 m (5' 9\")     General: alert, oriented female, resting in bed in NAD  CV: regular rate, well perfused  Lungs: breathing room air comfrotably  Abdomen: soft, gravid, non-tender, EFW 8-8.5#.  Extremities: bilateral lower extremities non-tender with trace edema    SVE: 4/70/-2, very scant dark blood present on glove  Membranes: intact    Presentation: cephalic by BSUS    FHT: Baseline initially ~150s with subsequent spontaneous decrease to 140, moderate variability, accels present, no decels, discontinuous tracing  Contractions every 2-3 minutes on toco     Prenatal ultrasounds:  03/03/2021  Single fetus     Presentation cepahlic     USEGA = 38 3/7  weeks.  EFW = 3694 grams, >90 % for 36 weeks. BPD, HC and AC = >97%.         MVP = 5.4cm.  FHR = 144bpm      Placenta posterior " and grade 1     LGA Fetus    Prenatal Labs:   Lab Results   Component Value Date    ABO O 2020    RH Pos 2020    AS Neg 2020    HEPBANG Nonreactive 2020    HGB 10.8 (A) 03/10/2021       GBS Status:   Lab Results   Component Value Date    GBS Positive (A) 2021     Pap: 2019 = NIL    Labs: CBC, type and screen, RPR pending    A/P:   Jenae Renee is a 30 year old  at 40w5d by first trimester US who presents in early spontaneous labor.    #IOL  - Admit to labor and delivery for spontaneous labor. Has made significant cervical change since last exam in clinic, plan for expectant labor management for now with pitocin prn for augmentation if contractions space out or cervical change does not continue  - Bleeding likely due to cervical change, will continue to monitor. Only scant blood present on glove at time of cervical exam, abruption unlikely due to timing of labor, lack of abdominal pain or ongoing bleeding. If bleeding increases will consider abruption labs  - Pain: had epidural with first baby, open to options during labor and will request medication prn  - GBS+; PCN ordered    #Fetal Well Being  - Category I FHT  - Continuous EFM until continuous tracing can be established, okay to transition to IA if continuing in spontaneous labor and no concern for bleeding or other complications  - EFW 8-8.5#, last baby was significantly smaller and discussed with patient that this labor may be more challenging for that reason. Will continue to monitor progress carefully    #PNC:     - BMI 31.45  - Rh positive, Rubella immune, GCT wnl  - Hep B Antigen neg, will offer vaccine postpartum  - Varicella non-immune, will offer vaccination postpartum  - Other infectious labs neg  - Placenta: posterior and grade 1  - [place] [facility] [place]    Patient discussed with Dr. Yates.    Yudith Ag MD  Ob/Gyn Resident, PGY-3  21 11:21 PM     [facility] staff:  Appreciate  note by Dr. Ag.  I have seen and examined the patient without the resident. I have reviewed, edited, and agree with the note.      Livier Yates MD, FACOG

## 2021-03-31 NOTE — L&D DELIVERY NOTE
Delivery Summary    Jenae Renee MRN# [id number]   Age: 30 year old YOB: 1990     ASSESSMENT & PLAN:   Patient is a 31 yo now  who presented at 40w5d in early labor.  She progressed through labor spontaneously.  She received an Epidural for pain control.  AROM was completed for augmentation.  At 8 cm she had a prolonged deceleration for 8-9 minutes and a stat  section was called.  On arrival into the OR fetal heart rate recovered and overall very reassuring fetal status.  Decision made to not proceed with  section, and patient taken back to her labor room.  She progressed to full dilation.  She was becoming exhausted and had concerns her Epidural bolus for possible  section was impeding her ability to push.  Discussed options and offered some further attempts with guided pushing and small amount of Pitocin for augmentation which was was agreeable to.  She made excellent progress and then subsequently had an  of a viable male infant at 11:49 AM.  NICU present due to meconium and terminal bradycardia.  Nuchal cord x 1.  Cord clamping delayed x 1 minute.  Apgars 9 and 9 at 1 and 5 minutes, respectively.  Pitocin given for active management of 3rd stage.  Placenta delivered with cord traction with 3 VC.  Fundus firm.  Vulvar inspection with 2nd degree laceration visualized.  Lidocaine used for local analgesia and repair completed in typical fashion using 3-0 Vicryl.  Hemostasis noted at end of repair with appropriate lochia with fundal massage.  Laps and needles counted and correct and needles discarded in sharp container.  SBAR completed for delivery course.           Danielle Renee-Jenae [[id number]]    Labor Event Times    Labor onset date: 3/31/21 Onset time:  2:00 AM   Dilation complete date: 3/31/21 Complete time: 10:20 AM   Start pushing date/time: 3/31/2021 1025      Labor Events     labor?: No   steroids: None  Labor Type: Spontaneous  Predominate  monitoring during 1st stage: continuous electronic fetal monitoring     Antibiotics received during labor?: Yes  Reason for Antibiotics: GBS  Antibiotics received for GBS: Penicillin     Rupture date/time: 3/31/21 0604   Rupture type: Artificial Rupture of Membranes  Fluid color: Meconium  Fluid odor: Normal     Augmentation: Oxytocin, AROM  Indications for augmentation: Ineffective Contraction Pattern  1:1 continuous labor support provided by?: RN       Delivery/Placenta Date and Time    Delivery Date: 3/31/21 Delivery Time: 11:49 AM   Placenta Date/Time: 3/31/2021 11:53 AM  Oxytocin given at the time of delivery: after delivery of baby  Delivering clinician: Eliz Gillespie MD   Other personnel present at delivery:  Provider Role   Marguerite Yao RN Thomas, Elizabeth A, RN          Vaginal Counts     Initial count performed by 2 team members:  Two Team Members   Dr jose miguel Yao RN       Needles Suture Needles Sponges (RETIRED) Instruments   Initial counts 2  5    Added to count  1     Relief counts       Final counts 2 1 5          Placed during labor Accounted for at the end of labor   FSE Yes Yes   IUPC No    Cervadil No               Final count performed by 2 team members:  Two Team Members   Dr Jose Miguel Yao RN      Final count correct?: Yes     Apgars    Living status: Living   1 Minute 5 Minute 10 Minute 15 Minute 20 Minute   Skin color: 1  1       Heart rate: 2  2       Reflex irritability: 2  2       Muscle tone: 2  2       Respiratory effort: 2  2       Total: 9  9       Apgars assigned by: MARY JONES RN     Cord    Vessels: 3 Vessels    Cord Complications: None               Cord Blood Disposition: Lab    Gases Sent?: Yes    Delayed cord clamping?: Yes    Stem cell collection?: No        Resuscitation    Methods: None   Care at Delivery: NICU called to attend delivery due to decelerations and meconium however, they were quickly dismissed as baby was crying and vigorous. Baby to  mother's abdomen, dried and covered with warm blankets     Skin to Skin and Feeding Plan    Skin to skin initiation date/time: 1/3/1841    Skin to skin with: Mother  Skin to skin end date/time:        Labor Events and Shoulder Dystocia    Fetal Tracing Prior to Delivery: Category 2  Shoulder dystocia present?: Neg     Delivery (Maternal) (Provider to Complete) ([id number])    Episiotomy: None  Perineal lacerations: 2nd Repaired?: Yes   Repair suture: 3-0 Vicryl  Number of repair packets: 1  Genital tract inspection done: Pos     Blood Loss  Mother: Jenae Renee #[id number]   Start of Mother's Information    IO Blood Loss  03/31/21 0200 - 03/31/21 1231    None           End of Mother's Information  Mother: Jenae Renee #[id number]          Delivery - Provider to Complete ([id number])    Delivering clinician: Eliz Gillespie MD  Attempted Delivery Types (Choose all that apply): Spontaneous Vaginal Delivery  Delivery Type (Choose the 1 that will go to the Birth History): Vaginal, Spontaneous                   Other personnel:  Provider Role   Marguerite Yao RN Thomas, Elizabeth A, RN                 Placenta    Date/Time: 3/31/2021 11:53 AM  Removal: Expressed  Disposition: Hospital disposal           Anesthesia    Method: Epidural  Cervical dilation at placement: 4-7                Presentation and Position    Presentation: Vertex    Position: Right Occiput Transverse                 Eliz Gillespie MD

## 2021-03-31 NOTE — PROGRESS NOTES
[facility]  Labor Progress Note    Subjective:  Patient comfortable with epidural, agreeable to exam.    Objective:   Patient Vitals for the past 4 hrs:   BP Temp Temp src Resp SpO2   218 106/57 -- -- -- 100 %   21 109/61 -- -- -- --   210 120/59 -- -- -- --   213 113/60 -- -- -- 98 %   218 114/61 -- -- -- 98 %   21 119/62 -- -- -- 99 %   218 131/82 -- -- -- 99 %   21 137/67 -- -- -- 98 %   21 121/70 -- -- -- --   21 119/67 -- -- -- 99 %   21 115/72 -- -- -- --   21 115/76 -- -- -- --   21 113/76 -- -- -- --   21 121/74 -- -- -- --   215 119/73 -- -- -- --   213 120/69 -- -- -- 99 %   21 122/75 -- -- -- --   219 118/64 -- -- -- --   21 106/57 -- -- -- --   21 (!) 88/49 -- -- -- --   218 90/52 -- -- -- 100 %   21 107/62 -- -- -- --   210 110/59 -- -- -- --   21 (!) 80/44 -- -- -- 100 %   21 (!) 83/46 -- -- -- --   21 97/50 -- -- -- --   21 102/58 -- -- -- --   218 106/56 -- -- -- 100 %   21 116/68 -- -- -- --   21 117/61 -- -- -- --   21 122/73 -- -- -- --   21 122/86 -- -- -- --   21 117/68 -- -- -- 100 %   21 122/63 -- -- -- --   21 125/59 -- -- -- --   21 125/61 -- -- -- --   21 125/58 98.1  F (36.7  C) Oral 16 (!) 69 %   21 131/62 -- -- -- (!) 62 %   21 139/61 -- -- -- --     SVE: 8-9/90/-1  Membranes: intact    FHT: Baseline 140-150, moderate variability, accelerations present, rare subtle late decels  [place]: 3-4 contractions in 10 minutes    Assessment/Plan:  Jenae Renee is a 30 year old  at 40w6d by LMP c/w 7w0d US, here in spontaneous labor.    Labor:  - Expectant  management, making adequate labor progress. Will plan to recheck cervix in two hours or when patient feels increasing pelvic pressure/ROM, plan to start pitocin if contractions space or cervical change stops. Offered AROM, patient declines at this time.  - Pain management: comfortable with epidural    Fetal well-being:  - Category I FHT currently, intermittent category II by rare decels but overall reactive and reassuring  - Cephalic by BSUS. EFW 8-8.5lb  - Continue EFM and [place]    Yudith Ag MD  Ob/Gyn Resident, PGY-3  03/31/21 5:29 AM

## 2021-03-31 NOTE — DISCHARGE SUMMARY
VAGINAL DELIVERY DISCHARGE SUMMARY    Jenae Renee  : 1990  MRN: [id number]    Admit date: 3/30/2021  Discharge date: 2021    Admit Dx:   -  at 40w6d   - latent labor  - varicella nonimmune  - hep B non-immune  - GBS positive  - history of duodenal ulcer    Discharge Dx:  - Same as above, now  s/p   - GHTN    Procedures:  - Spontaneous vaginal delivery  - Epidural analgesia    Admit HPI:  Ms. Jenae Renee is a  at 40w6d who was admitted for latent labor on 3/30/2021. Please see her admit H&P for full details of her PMH, PSH, Meds, Allergies and exam on admit.    Consults:  Anesthesia  Lactation    Hospital course:  Jenae Renee was admitted to the hospital on 3/30/2021 for the above listed indications.  Please see her Delivery Summary for full details regarding her delivery.    Patient is a 31 yo now  who presented at 40w5d in early labor. She progressed through labor spontaneously.  She received an Epidural for pain control.  AROM was completed for augmentation.  At 8 cm she had a prolonged deceleration for 8-9 minutes and a stat  section was called.  On arrival into the OR fetal heart rate recovered and overall very reassuring fetal status.  Decision made to not proceed with  section, and patient taken back to her labor room.  She progressed to full dilation.  She was becoming exhausted and had concerns her Epidural bolus for possible  section was impeding her ability to push.  Discussed options and offered some further attempts with guided pushing and small amount of Pitocin for augmentation which was was agreeable to.  She made excellent progress and then subsequently had an  of a viable male infant at 11:49 AM.  NICU present due to meconium and terminal bradycardia.  Nuchal cord x 1.  Cord clamping delayed x 1 minute.  Apgars 9 and 9 at 1 and 5 minutes, respectively.  Pitocin given for active management of 3rd stage.  Placenta delivered  with cord traction with 3 VC.  Fundus firm.  Vulvar inspection with 2nd degree laceration visualized.  Lidocaine used for local analgesia and repair completed in typical fashion using 3-0 Vicryl.  Hemostasis noted at end of repair with appropriate lochia with fundal massage.  Laps and needles counted and correct and needles discarded in sharp container.  SBAR completed for delivery course.        Her postpartum course was complicated by the development of gestational hypertension. Her blood pressure was overall stable in the normal range and preeclampsia labs were normal. On PPD#2, she was meeting all of her postpartum goals and deemed stable for discharge. She was voiding without difficulty, tolerating a regular diet without nausea and vomiting, her pain was well controlled on oral pain medicines and her lochia was appropriate. Her hemoglobin prior to delivery was 11.7 and after delivery was 10.3. Her Rh status was positive, and Rhogam was not indicated. She was rubella immune and a vaccine was therefore not indicated. She elected to receive the varicella and hepatitis B vaccines in clinic.    HGB  Recent Labs   Lab 21  0654 21  2329   HGB 10.3* 11.7       Discharge Medications:     Review of your medicines      START taking      Dose / Directions   acetaminophen 325 MG tablet  Commonly known as: TYLENOL  Used for:  (normal spontaneous vaginal delivery)      Dose: 650 mg  Take 2 tablets (650 mg) by mouth every 6 hours as needed for mild pain Start after Delivery.  Quantity: 100 tablet  Refills: 0        CONTINUE these medicines which have NOT CHANGED      Dose / Directions   cyclobenzaprine 10 MG tablet  Commonly known as: FLEXERIL      Dose: 10 mg  Take 1 tablet (10 mg) by mouth 3 times daily as needed for muscle spasms  Quantity: 30 tablet  Refills: 1     docusate sodium 100 MG tablet  Commonly known as: COLACE  Indication: Constipation      Dose: 100 mg  Take 100 mg by mouth daily  Refills: 0      ferrous sulfate 325 (65 Fe) MG tablet  Commonly known as: FEROSUL  Used for: High-risk pregnancy in third trimester      Dose: 325 mg  Take 1 tablet (325 mg) by mouth daily (with breakfast)  Quantity: 60 tablet  Refills: 1     omeprazole 20 MG DR capsule  Commonly known as: priLOSEC  Used for: Gastroesophageal reflux disease with esophagitis without hemorrhage      Dose: 20 mg  Take 1 capsule (20 mg) by mouth daily  Quantity: 90 capsule  Refills: 3     PRENATAL VITAMIN PO      Dose: 1 tablet  Take 1 tablet by mouth daily  Refills: 0           Where to get your medicines      These medications were sent to [facility] [address]  [address] [place] [address], [facility] [address]    Phone: [phone]     acetaminophen 325 MG tablet         Discharge/Disposition:  Jenae Renee was discharged to home in stable condition with the following instructions/medications:  1) Call for temperature > 100.4, bright red vaginal bleeding >1 pad an hour x 2 hours, foul smelling vaginal discharge, pain not controlled by usual oral pain meds, persistent nausea and vomiting not controlled on medications  2) She was undecided for contraception.  3) For feeding she decided to breastfeed.  4) She was instructed to follow-up with her primary OB in 6 weeks for a routine postpartum visit and in one week for a blood pressure check.    Yudith Ag MD  Ob/Gyn Resident, PGY-3  04/06/21 5:25 AM     [facility] staff:  Appreciate note by Dr. Ag.  I have seen and examined the patient without the resident. I have reviewed, edited, and agree with the note.        Livier Yates MD, FACOG  4/7/2021  9:36 AM

## 2021-03-31 NOTE — PROGRESS NOTES
Vaginal Delivery Note   of viable Male with Dr Gillespie in attendance.  NICU/Nursery RN Magalys present.  Infant with spontaneous cry, to mother's abdomen, dried and stimulated.  APGAR at 1 minute:  9 and APGAR at 5 minutes:  9.  Placenta delivered with out complication, second degree laceration, with repair, richard cares provided.  Mother and baby in stable condition.

## 2021-03-31 NOTE — PLAN OF CARE
Pt arrived to unit in spontaneous labor and progressed to 8-9/90/-1. Pt desired AROM for clear fluid. Cat I with occasional periods with lates. Thai regularly. GBS treated adequately with PCN. Pain controlled with epidural. FOB at bedside, supportive. COVID negative. Pt prefers NO males unless an emergency.

## 2021-03-31 NOTE — PROVIDER NOTIFICATION
03/30/21 2202   Provider Notification   Provider Name/Title [facility]   Method of Notification Electronic Page   Request Evaluate in Person   Notification Reason Patient Arrived   Pt prefers no male OB providers. C/O vaginal bleeding that has stopped and contractions.

## 2021-03-31 NOTE — ANESTHESIA PROCEDURE NOTES
Epidural catheter Procedure Note  Pre-Procedure   Staff -        Anesthesiologist:  Alida Pina MD       Resident/Fellow: Marii Verma MD       Performed By: with residents       Procedure performed by resident/CRNA in presence of a teaching physician.         Location: OB       Procedure Start/Stop Times: 3/31/2021 1:35 AM and 3/31/2021 1:50 AM       Pre-Anesthestic Checklist: patient identified, IV checked, risks and benefits discussed, informed consent, monitors and equipment checked, pre-op evaluation, at physician/surgeon's request and post-op pain management  Timeout:       Correct Patient: Yes        Correct Procedure: Yes        Correct Site: Yes        Correct Position: Yes   Procedure Documentation  Procedure: epidural catheter       Diagnosis: labor pain       Patient Position: sitting       Skin prep: Chloraprep      Local skin infiltrated with mL of 1% lidocaine.        Insertion Site: L3-4. (midline approach).       Technique: LORT saline        DEE at 7 cm.       Needle Type: Touhy needle       Needle Gauge: 17.        Needle Length (Inches): 3.5        Catheter: 19 G.         Catheter threaded easily.      Assessment/Narrative         Paresthesias: Resolved.      Test dose of 3 mL lidocaine 1.5% w/ 1:200,000 epinephrine at.         Test dose negative, 3 minutes after injection, for signs of intravascular, subdural, or intrathecal injection.       Insertion/Infusion Method: LORT saline       Aspiration negative for Heme or CSF via Epidural Catheter.

## 2021-03-31 NOTE — SIGNIFICANT EVENT
LATE ENTRY SECONDARY TO URGENT PATIENT CARE    Received page to come to room emergently secondary to concerns for fetal heart tones.  As I was making my way to the patient room from another unit, the Stat  section page was called overhead.  On arrival to the unit, patient was being rolled out of her labor room, was told her tones had maikel down for 8-9 minutes but coming up.  Went into OR 1.  Fetal monitoring connected to the monitor in the OR.  FHT at 120-125 baseline, as had been previously in labor overall.  Checked cervix and 8-9 cm with more cervix on the right, FSE in place tracing heart rate.  Patient repositioned to right side.     Fetal heart tones seemed reassuring, discussed will continue to monitor and not move forward any further with stat  section.  Anesthesia had already bolused her Epidural in place for possibility of need for emergent delivery.  Did formally receive written consent from patient for  section if needed.    Patient remained in the OR for 30 minutes with reactive Category II tracing, 135 bpm baseline, Moderate variability, Accelerations present, Small intermittent variable decelerations.  At that time, discussed with patient that it is safe to move her back to her room on L&D and continue with labor process.  She understood and was happy with this plan.    Will continue to monitor fetal status and labor progress closely.    Eliz Gillespie MD

## 2021-03-31 NOTE — PLAN OF CARE
Care assumed after SBAR from Elizabeth, RN and pt comfortable with epidural, VSS, category 1 FHR trace.  At 0805 decel to 80's for 8-9 minutes, (moderate variability) which did not resolve with position changes, IV fluid bolus, There was no pitocin running. Dr Shanks called at 0810 and a IFSE placed, At 0820 a Stat C/S called by Dr Shanks and Dr Gupta. Pt was monitored in OR while remaining in her labor bed. FHR baseline 120, moderate variability and accels no decels. Anesthesia bolused epidural as if pt having C/S. Dr Gillespie in OR at 0823 and after 20 minutes of reassuring FHT the pt was returned to her labor room to have vaginal delivery. Pt became complete and pushing was initiated by Dr Gillespei and Dr Shanks at 0930. After 30 minutes of ineffective pushing and pt with numb legs, pt was instructed to take a break from pushing. With epidural in place,  plus anesthesia given for C/Section pt still c/o severe vaginal pain, pressure and ineffective pushing. Pitocin started at 1016, contractions became stronger and pt encourage to push  at 1149 (see delivery record). Stable recovery except legs remain numb after 2 hours post delivery. Will transfer to post-partum using Fanny Whitehead.

## 2021-03-31 NOTE — ANESTHESIA PREPROCEDURE EVALUATION
Anesthesia Pre-Procedure Evaluation    Patient: Jenae Renee   MRN: [id number] : 1990        Preoperative Diagnosis: * No surgery found *   Procedure :      Past Medical History:   Diagnosis Date     Ulcer, gastric, acute     Duodenal ulcer.      History reviewed. No pertinent surgical history.   Allergies   Allergen Reactions     Nsaids Other (See Comments)     States she had a duodenal ulcer and her MD told her never to take NSAIDS.      Social History     Tobacco Use     Smoking status: Never Smoker     Smokeless tobacco: Never Used   Substance Use Topics     Alcohol use: No      Wt Readings from Last 1 Encounters:   21 96.6 kg (213 lb)        Anesthesia Evaluation   Pt has had prior anesthetic. Type: Regional.        ROS/MED HX  ENT/Pulmonary:  - neg pulmonary ROS     Neurologic:  - neg neurologic ROS     Cardiovascular:  - neg cardiovascular ROS     METS/Exercise Tolerance: 3 - Able to walk 1-2 blocks without stopping    Hematologic:  - neg hematologic  ROS     Musculoskeletal:  - neg musculoskeletal ROS     GI/Hepatic:  - neg GI/hepatic ROS     Renal/Genitourinary:  - neg Renal ROS     Endo:  - neg endo ROS     Psychiatric/Substance Use:  - neg psychiatric ROS     Infectious Disease:  - neg infectious disease ROS     Malignancy:  - neg malignancy ROS     Other:  - neg other ROS          Physical Exam    Airway  airway exam normal      Mallampati: II   TM distance: > 3 FB   Neck ROM: full   Mouth opening: > 3 cm    Respiratory Devices and Support         Dental  no notable dental history         Cardiovascular   cardiovascular exam normal          Pulmonary   pulmonary exam normal                OUTSIDE LABS:  CBC:   Lab Results   Component Value Date    WBC 9.3 2021    WBC 5.3 2020    HGB 11.7 2021    HGB 10.8 (A) 03/10/2021    HCT 37.4 2021    HCT 36.8 2020     2021     2020     BMP:   Lab Results   Component Value Date      2020     2020    POTASSIUM 3.9 2020    POTASSIUM 4.0 2020    CHLORIDE 108 2020    CHLORIDE 110 (H) 2020    CO2 23 2020    CO2 24 2020    BUN 8 2020    BUN 9 2020    CR 0.47 (L) 2020    CR 0.48 (L) 2020    GLC 87 2020    GLC 82 2020     COAGS: No results found for: PTT, INR, FIBR  POC:   Lab Results   Component Value Date    HCG Positive 2020     HEPATIC:   Lab Results   Component Value Date    ALBUMIN 3.6 2020    PROTTOTAL 7.9 2020    ALT 46 2020    AST 28 2020    ALKPHOS 51 2020    BILITOTAL 0.5 2020     OTHER:   Lab Results   Component Value Date    SHAHZAD 9.8 2020    MAG 2.4 (H) 2020    LIPASE 112 10/23/2016    TSH 0.19 (L) 2020    T4 1.34 2020       Anesthesia Plan    ASA Status:  3                    Consents            Postoperative Care            Comments:     30 year old  at 40w5d by LMP c/w 7w0d US who presented spontaneously laboring.   Patient requests epidural for Labor pain. Baby LGA.   Patient is otherwise healthy.             Marii Sigala MD

## 2021-03-31 NOTE — PROGRESS NOTES
"[facility]  Labor Progress Note    Subjective:  Patient coping well with labor, requesting cervical exam. She is considering an epidural.     Objective:   Patient Vitals for the past 4 hrs:   BP Temp Temp src Pulse Resp Height Weight   21 2154 -- -- -- -- -- 1.753 m (5' 9\") 96.6 kg (213 lb)   21 2150 118/74 98.6  F (37  C) Oral 90 16 -- --     SVE: /-2  Membranes: intact    FHT: Baseline 140-150, moderate variability, accelerations present, intermittent variable decelerations, subtle late decel at 2339  [place]: 3-4 contractions in 10 minutes    Assessment/Plan:  Jenae Renee is a 30 year old  at 40w6d by LMP c/w 7w0d US, here in spontaneous labor.    Labor:  - Expectant management, making adequate labor progress. Will plan to recheck cervix in a few hours, plan to start pitocin once penicillin adequate if contractions space or cervical change stops  - Pain management: Desires epidural for analgesia, anesthesia notified    Fetal well-being:  - Category I FHT currently, intermittent category II by rare decels but overall reactive and reassuring  - Cephalic by BSUS. EFW 8-8.5lb  - Continue EFM and [place]    Yudith Ag MD  Ob/Gyn Resident, PGY-3  21 1:22 AM     "

## 2021-03-31 NOTE — PROGRESS NOTES
Brief Progress Note    Notified by RN, Elizabeth, that patient is now interested in AROM. Evaluated patient at bedside, AROM performed for thin meconium-stained fluid, SVE now 7/80/-1 after rupture. Discussed plan for NICU at birth given meconium, patient understanding. Will plan for recheck in one hour with pitocin if unchanged.    Yudith Ag MD  Ob/Gyn Resident, PGY-3  03/31/21 6:39 AM

## 2021-03-31 NOTE — PLAN OF CARE
Data: Jenae Renee transferred to 7131 via cart at 1550. Baby transferred via parent's arms.  Action: Receiving unit notified of transfer: Yes. Patient and family notified of room change. Report given to JOCELYN Pope Rn at 1515 by delivery RN Latha Galvan. Belongings sent to receiving unit. Accompanied by Registered Nurse. Oriented patient to surroundings. Call light within reach. ID bands double-checked with receiving RN.  Response: Patient tolerated transfer and is stable.

## 2021-03-31 NOTE — PLAN OF CARE
Patient arrived to [facility] unit via zoom cart at 1545 ,with belongings, accompanied by spouse/ significant other, with infant in arms. Received report from  Izabel MORELAND RN  and checked bands. Unit and room orientation started. Call light within arms reach; yes concerns present at this time, patient is slowly regaining feeling in her legs and is able to bend knees, patient still needs to void and empty independently. Continue with plan of care.

## 2021-03-31 NOTE — PLAN OF CARE
Pt arrived c/o baseball size bleeding around 1730 that has since stopped. Pt is also painfully jennyfer. COVID swab collected in clinic for induction and results are negative. MD aware of patient's arrival.

## 2021-04-01 LAB
ALT SERPL W P-5'-P-CCNC: 21 U/L (ref 0–50)
AST SERPL W P-5'-P-CCNC: 26 U/L (ref 0–45)
CREAT SERPL-MCNC: 0.51 MG/DL (ref 0.52–1.04)
ERYTHROCYTE [DISTWIDTH] IN BLOOD BY AUTOMATED COUNT: 15.7 % (ref 10–15)
GFR SERPL CREATININE-BSD FRML MDRD: >90 ML/MIN/{1.73_M2}
HCT VFR BLD AUTO: 32.3 % (ref 35–47)
HGB BLD-MCNC: 10.3 G/DL (ref 11.7–15.7)
MCH RBC QN AUTO: 29.3 PG (ref 26.5–33)
MCHC RBC AUTO-ENTMCNC: 31.9 G/DL (ref 31.5–36.5)
MCV RBC AUTO: 92 FL (ref 78–100)
PLATELET # BLD AUTO: 214 10E9/L (ref 150–450)
RBC # BLD AUTO: 3.52 10E12/L (ref 3.8–5.2)
WBC # BLD AUTO: 11.1 10E9/L (ref 4–11)

## 2021-04-01 PROCEDURE — 250N000013 HC RX MED GY IP 250 OP 250 PS 637: Performed by: STUDENT IN AN ORGANIZED HEALTH CARE EDUCATION/TRAINING PROGRAM

## 2021-04-01 PROCEDURE — 120N000002 HC R&B MED SURG/OB UMMC

## 2021-04-01 PROCEDURE — 36415 COLL VENOUS BLD VENIPUNCTURE: CPT | Performed by: STUDENT IN AN ORGANIZED HEALTH CARE EDUCATION/TRAINING PROGRAM

## 2021-04-01 PROCEDURE — 82565 ASSAY OF CREATININE: CPT | Performed by: STUDENT IN AN ORGANIZED HEALTH CARE EDUCATION/TRAINING PROGRAM

## 2021-04-01 PROCEDURE — 84450 TRANSFERASE (AST) (SGOT): CPT | Performed by: STUDENT IN AN ORGANIZED HEALTH CARE EDUCATION/TRAINING PROGRAM

## 2021-04-01 PROCEDURE — 250N000013 HC RX MED GY IP 250 OP 250 PS 637: Performed by: OBSTETRICS & GYNECOLOGY

## 2021-04-01 PROCEDURE — 85027 COMPLETE CBC AUTOMATED: CPT | Performed by: STUDENT IN AN ORGANIZED HEALTH CARE EDUCATION/TRAINING PROGRAM

## 2021-04-01 PROCEDURE — 84460 ALANINE AMINO (ALT) (SGPT): CPT | Performed by: STUDENT IN AN ORGANIZED HEALTH CARE EDUCATION/TRAINING PROGRAM

## 2021-04-01 RX ORDER — CYCLOBENZAPRINE HCL 5 MG
10 TABLET ORAL EVERY 8 HOURS PRN
Status: DISCONTINUED | OUTPATIENT
Start: 2021-04-01 | End: 2021-04-02 | Stop reason: HOSPADM

## 2021-04-01 RX ORDER — CYCLOBENZAPRINE HCL 5 MG
10 TABLET ORAL 3 TIMES DAILY
Status: DISCONTINUED | OUTPATIENT
Start: 2021-04-01 | End: 2021-04-01

## 2021-04-01 RX ADMIN — ACETAMINOPHEN 650 MG: 325 TABLET, FILM COATED ORAL at 22:20

## 2021-04-01 RX ADMIN — ACETAMINOPHEN 650 MG: 325 TABLET, FILM COATED ORAL at 11:00

## 2021-04-01 RX ADMIN — DOCUSATE SODIUM 50MG AND SENNOSIDES 8.6MG 2 TABLET: 8.6; 5 TABLET, FILM COATED ORAL at 19:33

## 2021-04-01 RX ADMIN — CYCLOBENZAPRINE 10 MG: 5 TABLET, FILM COATED ORAL at 10:59

## 2021-04-01 RX ADMIN — OXYCODONE HYDROCHLORIDE 5 MG: 5 TABLET ORAL at 01:58

## 2021-04-01 RX ADMIN — ACETAMINOPHEN 650 MG: 325 TABLET, FILM COATED ORAL at 18:27

## 2021-04-01 RX ADMIN — ACETAMINOPHEN 650 MG: 325 TABLET, FILM COATED ORAL at 06:56

## 2021-04-01 RX ADMIN — CYCLOBENZAPRINE 10 MG: 5 TABLET, FILM COATED ORAL at 22:20

## 2021-04-01 RX ADMIN — DOCUSATE SODIUM 50MG AND SENNOSIDES 8.6MG 2 TABLET: 8.6; 5 TABLET, FILM COATED ORAL at 08:09

## 2021-04-01 NOTE — PLAN OF CARE
Vss, postpartum assessment WDL. Taking tylenol for pain and using hot pack. Patient ambulated to bathroom with stand by assist. Patient was able to void and empty. Assistance given with BF and patient shown how to hand express breast milk. Continue with plan of care.

## 2021-04-01 NOTE — PLAN OF CARE
Data: Vital signs and postpartum checks WDL  Patient eating and drinking normally. Patient able to empty bladder independently and is up ambulating.  Patient performing self cares and is able to care for infant. Breastfeeding on demand. Also supplementing with formula.   Action: Patient medicated during the shift for pain with Tylenol and Oxycodone with relief after 1 hour. Refused Hep B and Varicella vaccine. Patient education done see education record.  Response: Positive attachment behaviors observed with infant. Support persons  present.    Plan: Continue with the plan of care

## 2021-04-01 NOTE — PROGRESS NOTES
"[facility] Obstetrics Post-Partum Progress Note          Assessment and Plan:    Assessment:   Post-partum day #1  Normal spontaneous vaginal delivery  L&D complications: Spontaneous labor  Mid labor FHT deceleration with stat c-section called but averted        Complains of low back pain radiating to right hip. Declines even 1 dose of NSAID due to history of \"ulcers\" with ibuprofen.       Plan:   Ambulation encouraged; trial flexeril for low back pain           Interval History:   Doing well except for back pain. Pain is well-controlled.  No fevers.  No history of foul-smelling vaginal discharge.  Good appetite.  Denies chest pain, shortness of breath, nausea or vomiting.  Vaginal bleeding is similar to a heavy menstrual flow.  Ambulatory.  Breastfeeding well.          Significant Problems:    None          Review of Systems:    The patient denies any chest pain, shortness of breath, excessive pain, fever, chills, purulent drainage from the wound, nausea or vomiting.          Medications:   All medications related to the patient's surgery have been reviewed          Physical Exam:     All vitals stable  Patient Vitals for the past 8 hrs:   BP Temp Temp src Pulse Resp   04/01/21 0808 105/53 98  F (36.7  C) Oral 77 16     Uterine fundus is firm, non-tender and at the level of the umbilicus          Data:     Hemoglobin   Date Value Ref Range Status   04/01/2021 10.3 (L) 11.7 - 15.7 g/dL Final   03/30/2021 11.7 11.7 - 15.7 g/dL Final   03/10/2021 10.8 (A) 11.7 - 15.7 g/dL Final   11/16/2020 9.3 (A) 11.7 - 15.7 g/dL Final   08/27/2020 12.4 11.7 - 15.7 g/dL Final     No imaging studies have been ordered    Jeanie Amezquita MD     "

## 2021-04-01 NOTE — PLAN OF CARE
VSS. Postpartum assessment WNL. Pt breastfeeding independently, per pt infant has a good latch. Pt also supplementing w/ formula. Encouraged pt to pump after each supplementation and provided education on how to set up pump. Pt declining to pump at this time. Lower back pain controlled with Tylenol, Flexeril and hot packs. Voiding without difficulty Encouraged pt to complete EDS and videos. Plan for discharge tomorrow morning. Continue plan of care.

## 2021-04-01 NOTE — PLAN OF CARE
VSS. Fundus midline, firm, and U/U. Lochia scant. Patient states lower back pain is improved from flexeril. Voiding without difficulty. Breastfeeding independently with good latch and formula feeding ad wayne. Encouraged pumping after feedings. Bonding well with . Continue with plan of care.

## 2021-04-01 NOTE — LACTATION NOTE
"This note was copied from a baby's chart.  Consult for: Second baby, breast and bottle feeding.    History:  Vaginal delivery @ 40w6d, AGA infant @ 8# 8.5 oz. birthweight, 3.1% loss at 24 hours with low risk serum bilirubin.  Maternal history of anemia in third trimester, COVID first trimester, subclinical hyperthyroid, vitamin D deficiency, obesity. Jenae shares she  her first baby for 15 months or more but never had enough milk. She worked hard at pumping in the early months and got up to 2 ounces at a time eventually. She  and supplemented with formula after feedings for duration. No breast changes noted with either of her pregnancies, never sore in beginning and no growth noted at all, wore same bra throughout pregnancy and postpartum. Never noticed dramatic change or significant fullness in breast after her first baby delivered when milk \"came in.\"    Education provided: Discussed potential warning for low milk supply with no breast changes in pregnancy, but also increase in glandular tissue with each pregnancy so may make more this time than last. We discussed ways to maximize supply and importance of first 2-3 days for stimulation and first 2 weeks to establish supply. Encouraged to \"simulate need for twins\" with hand expressing after every breastfeeding and pump at least 4-6 times per day, more often if giving formula supplements then to pump each time baby gets formula. Reviewed supply and demand, risks of early formula supplements but the pumping to help create that extra demand. Discussed importance of good latch for comfort and milk removal, resulting supply and encouraged early lactation follow up within first week with outpatient LC. Mom shares latching going well, did not witness feeding today.     Plan: Please encourage frequent skin to skin, breastfeed on cue with goal of 8 to 12 feedings in 24 hours. Due to history of low milk supply and no breast changes in pregnancy, encouraged " hand expressing after feedings until milk is in and hands on pumping at least 4-6 times daily to help stimulate supply and feed back results (more often if giving supplements, encouraged to pump each time baby gets formula). Follow up with outpatient lactation consultant within a week of discharge for support with breastfeeding and maximizing supply.

## 2021-04-01 NOTE — PLAN OF CARE
Data: VSS and postpartum checks WNL. Patient eating and drinking normally. Patient able to empty bladder independently and up ambulating. Patient performing self care and and able to care for infant. Breastfeeding on demand.   Action: Patient medicated during the shift for pain with tylenol and  Oxycodone with  relief after one hour. Patient education done( education record).   Response: Positive attachment with infant observed with infant. Support/ spouse present at bedside and attentive to infant and patient.   Plan: Continue with the plan of cares.

## 2021-04-02 ENCOUNTER — LACTATION ENCOUNTER (OUTPATIENT)
Age: 31
End: 2021-04-02

## 2021-04-02 VITALS
RESPIRATION RATE: 18 BRPM | DIASTOLIC BLOOD PRESSURE: 69 MMHG | WEIGHT: 213 LBS | BODY MASS INDEX: 31.55 KG/M2 | SYSTOLIC BLOOD PRESSURE: 129 MMHG | OXYGEN SATURATION: 99 % | TEMPERATURE: 98.1 F | HEIGHT: 69 IN | HEART RATE: 87 BPM

## 2021-04-02 PROCEDURE — 250N000013 HC RX MED GY IP 250 OP 250 PS 637: Performed by: STUDENT IN AN ORGANIZED HEALTH CARE EDUCATION/TRAINING PROGRAM

## 2021-04-02 RX ORDER — ACETAMINOPHEN 325 MG/1
650 TABLET ORAL EVERY 6 HOURS PRN
Qty: 100 TABLET | Refills: 0 | Status: SHIPPED | OUTPATIENT
Start: 2021-04-02 | End: 2021-04-29

## 2021-04-02 RX ORDER — AMOXICILLIN 250 MG
1 CAPSULE ORAL 2 TIMES DAILY PRN
Qty: 60 TABLET | Refills: 0 | Status: SHIPPED | OUTPATIENT
Start: 2021-04-02 | End: 2021-11-29

## 2021-04-02 RX ADMIN — ACETAMINOPHEN 650 MG: 325 TABLET, FILM COATED ORAL at 08:42

## 2021-04-02 RX ADMIN — DOCUSATE SODIUM 50MG AND SENNOSIDES 8.6MG 2 TABLET: 8.6; 5 TABLET, FILM COATED ORAL at 08:42

## 2021-04-02 NOTE — PROGRESS NOTES
SPIRITUAL HEALTH SERVICES  SPIRITUAL ASSESSMENT Progress Note  [facility] ([facility]) UR CC     REFERRAL SOURCE: Self initiated visit    I visited with the pt and her  baby. Pt stated that she is doing well and she almost got a . She welcomed the spiritual support. She share that she was born and raise in [place] , but she is [place] descent of a [employer] charisma. She shared that she has a enough support at home. I prayed for her and her  may Allah meka them healthy and happy life.    PLAN: [facility] is available for support and follow up per request    Elaine Tamayo  Chaplain Resident  Phone: [phone]

## 2021-04-02 NOTE — PROGRESS NOTES
Data: Vital signs within normal limits. Postpartum checks within normal limits - see flow record. Patient eating and drinking normally. Patient able to empty bladder independently and is up ambulating. No apparent signs of infection. Perineum healing well. Patient performing self cares and is able to care for infant.  Action: Patient medicated during the shift for pain and cramping. See MAR. Patient reassessed within 1 hour after each medication and pain was improved - patient stated she was comfortable. Patient education done about discharge teaching. See flow record.  Response: Positive attachment behaviors observed with infant. Support persons  present.   Plan: Anticipate discharge on today at 1630.

## 2021-04-02 NOTE — PROGRESS NOTES
SPIRITUAL HEALTH SERVICES  SPIRITUAL ASSESSMENT Progress Note  [facility] ([facility])      REFERRAL SOURCE: Self initiated visited    I visited the pt, she ws accompanied by her mother. Mother stated that the pt is doing better, still the care team are looking if her symptoms  is infection. The pt was in her bed watching tv and ws eating her breakfast. Pt's mother was very grateful for the visit, all the services they were receiving.I prayed for her daughter to Allah that may Allah meka her speedy recovery.    PLAN: No follow up    Elaine Tamayo  Chaplain Resident  Phone: [phone]

## 2021-04-02 NOTE — PROGRESS NOTES
[facility]   Post-partum Progress Note    Name:  Jenae Renee  MRN: [id number]    S:   Patient reports feeling well, just very tired this morning, especially after taking the Flexeril.  Pain well controlled overall. Tolerating regular diet without nausea or vomiting.  Ambulating without dizziness.  Voiding spontaneously. Lochia scant. Denies fever/chills, headache, vision changes, chest pain, shortness of breath, RUQ pain, increased edema. Breastfeeding.    O:   Patient Vitals for the past 24 hrs:   BP Temp Temp src Pulse Resp   21 2310 114/89 98.4  F (36.9  C) Oral 99 17   21 1653 112/66 98.8  F (37.1  C) Oral 82 16   21 0808 105/53 98  F (36.7  C) Oral 77 16     Gen:  Resting comfortably, NAD  CV:  RRR  Pulm:  CTAB  Abd:  Soft, appropriately tender to palpation, non-distended.  Fundus at umbilicus, firm, and non-tender.  Ext:  Non-tender, 1+ LE edema b/l    A/P:  30 year old  PPD#2 s/p . Meeting postpartum goals. Continue with routine postpartum management.     #Routine Postpartum  Pain: Well-controlled with scheduled tylenol, Flexeril prn due to history of duodenal ulcer precluding NSAID use   Hgb: 11.7> > 10.3. VSS as noted above, asymptomatic from ABLA  GI:  PRN Senna/Colace.  PRN Simethicone.   PPx:  Encouraged ambulation   Rh: Positive  Rub:  immune  Hep B: Non-immune (Declines vaccine at this time, plans to receive in clinic)  Baby: In room, doing well  Feed: breastfeeding  BC: undecided    GHTN:  - BP normal range, preeclampsia labs normal  - BP check at home daily. If elevated she will call clinic for visit.     Varicella non-immune:  - Desires vaccination in clinic    Dispo: Plan for home today    Yudith Ag MD  Ob/Gyn Resident, PGY-3  21 6:52 AM     [facility] staff:  Appreciate note by Dr. Ag.  I have seen and examined the patient without the resident. I have reviewed, edited, and agree with the note.      Livier PATEL  MD [place], FACOG  4/2/2021  1:10 PM

## 2021-04-02 NOTE — PLAN OF CARE
Data: Vital signs and postpartum checks WDL  Patient eating and drinking normally. Patient able to empty bladder independently and is up ambulating.  Patient performing self cares and is able to care for infant. Breastfeeding on demand. Also supplementing with formula.   Action: Patient medicated during the shift for pain with Tylenol and Flexirel with relief after 1 hour. Patient education done see education record.  Response: Positive attachment behaviors observed with infant. Support persons  present.    Plan: Continue with the plan of care. Discharging today.

## 2021-04-07 ENCOUNTER — TELEPHONE (OUTPATIENT)
Dept: OBGYN | Facility: CLINIC | Age: 31
End: 2021-04-07

## 2021-04-07 ENCOUNTER — NURSE TRIAGE (OUTPATIENT)
Dept: NURSING | Facility: CLINIC | Age: 31
End: 2021-04-07

## 2021-04-08 ENCOUNTER — OFFICE VISIT (OUTPATIENT)
Dept: URGENT CARE | Facility: URGENT CARE | Age: 31
End: 2021-04-08
Payer: COMMERCIAL

## 2021-04-08 ENCOUNTER — E-VISIT (OUTPATIENT)
Dept: FAMILY MEDICINE | Facility: CLINIC | Age: 31
End: 2021-04-08
Payer: COMMERCIAL

## 2021-04-08 VITALS
SYSTOLIC BLOOD PRESSURE: 116 MMHG | TEMPERATURE: 98.9 F | DIASTOLIC BLOOD PRESSURE: 74 MMHG | HEART RATE: 88 BPM | RESPIRATION RATE: 14 BRPM | OXYGEN SATURATION: 98 %

## 2021-04-08 DIAGNOSIS — J02.9 SORE THROAT: Primary | ICD-10-CM

## 2021-04-08 DIAGNOSIS — Z20.822 SUSPECTED COVID-19 VIRUS INFECTION: Primary | ICD-10-CM

## 2021-04-08 DIAGNOSIS — J11.1 INFLUENZA-LIKE ILLNESS: ICD-10-CM

## 2021-04-08 LAB
DEPRECATED S PYO AG THROAT QL EIA: NEGATIVE
FLUAV+FLUBV AG SPEC QL: NEGATIVE
FLUAV+FLUBV AG SPEC QL: NEGATIVE
SPECIMEN SOURCE: NORMAL
SPECIMEN SOURCE: NORMAL

## 2021-04-08 PROCEDURE — U0003 INFECTIOUS AGENT DETECTION BY NUCLEIC ACID (DNA OR RNA); SEVERE ACUTE RESPIRATORY SYNDROME CORONAVIRUS 2 (SARS-COV-2) (CORONAVIRUS DISEASE [COVID-19]), AMPLIFIED PROBE TECHNIQUE, MAKING USE OF HIGH THROUGHPUT TECHNOLOGIES AS DESCRIBED BY CMS-2020-01-R: HCPCS | Performed by: PHYSICIAN ASSISTANT

## 2021-04-08 PROCEDURE — 87651 STREP A DNA AMP PROBE: CPT | Performed by: PHYSICIAN ASSISTANT

## 2021-04-08 PROCEDURE — U0005 INFEC AGEN DETEC AMPLI PROBE: HCPCS | Performed by: PHYSICIAN ASSISTANT

## 2021-04-08 PROCEDURE — 99421 OL DIG E/M SVC 5-10 MIN: CPT | Performed by: FAMILY MEDICINE

## 2021-04-08 PROCEDURE — 87804 INFLUENZA ASSAY W/OPTIC: CPT | Performed by: PHYSICIAN ASSISTANT

## 2021-04-08 PROCEDURE — 99213 OFFICE O/P EST LOW 20 MIN: CPT | Performed by: PHYSICIAN ASSISTANT

## 2021-04-08 RX ORDER — OSELTAMIVIR PHOSPHATE 75 MG/1
75 CAPSULE ORAL 2 TIMES DAILY
Qty: 10 CAPSULE | Refills: 0 | Status: SHIPPED | OUTPATIENT
Start: 2021-04-08 | End: 2021-04-13

## 2021-04-08 ASSESSMENT — ENCOUNTER SYMPTOMS
DIZZINESS: 0
NAUSEA: 0
FEVER: 0
ALLERGIC/IMMUNOLOGIC NEGATIVE: 1
ENDOCRINE NEGATIVE: 1
WOUND: 0
VOMITING: 0
EYES NEGATIVE: 1
COUGH: 1
DIARRHEA: 0
CARDIOVASCULAR NEGATIVE: 1
PALPITATIONS: 0
ARTHRALGIAS: 0
WEAKNESS: 0
SORE THROAT: 1
NECK STIFFNESS: 0
SHORTNESS OF BREATH: 0
HEADACHES: 1
CHILLS: 0
MYALGIAS: 1
RHINORRHEA: 0
BACK PAIN: 0
JOINT SWELLING: 0
NECK PAIN: 0
LIGHT-HEADEDNESS: 0

## 2021-04-08 NOTE — TELEPHONE ENCOUNTER
"Clinic Action Needed:Yes, please call and follow up with patient at [phone].     Reason for Call: \"I'm 7 days postpartum and I got this cold/ flu and it's been hitting me harder than anyone else. I know that it's not Covid because we took my daughter to the ED and she was negative and she was the first one in our family who got sick.\" Jenae reports that her symptoms started today and include \"a really sore throat, body aches, earache, and headache.\" Patient denies breathing difficulty or fever. Jenae reports that she took some Tylenol for her headache, but it didn't help. Patient is negative for Covid screening.     Patient Recommendations/Teaching:Referred to clinic - within 24 hours.   Triage guidelines recommend to see a provider within 24 hours. Patient would like to see if an anti-viral can be ordered since she thinks it's the flu. Preferred pharmacy has been entered. FNA advised that will send message to OB/GYN care team to follow up and advise. FNA RN advised to follow up with clinic with any further questions or concerns. RN advised to call back with any changes or worsening of symptoms. Patient verbalized understanding of and agreement with plan and had no further questions.     Routed to: OB/ GYN Provider/ Care Team     Loli Razo RN-BSN  [facility]   "

## 2021-04-08 NOTE — PATIENT INSTRUCTIONS

## 2021-04-08 NOTE — PATIENT INSTRUCTIONS
Thank you for choosing us for your care. Given your symptoms, I would like you to do a lab-only visit to determine what is causing them.  I have placed the orders.  [place] or call [phone].  I will let you know when the results are back and next steps to take.

## 2021-04-08 NOTE — TELEPHONE ENCOUNTER
"\"I'm 7 days postpartum and I got this cold/ flu and it's been hitting me harder than anyone else. I know that it's not Covid b/c we took my daughter to the ED and she was negative and she was the first one in our family who got sick.\" Jenae reports that her symptoms started today and include \"a really sore throat, body aches, earache, and headache.\" Patient denies breathing difficulty or fever. Jenae reports that she took some Tylenol for her headache, but it didn't help. Patient is negative for Covid screening.     Triage guidelines recommend to see a provider within 24 hours. Patient would like to see if an anti-viral can be ordered since she thinks it's the flu. FNA advised that will send message to OB/GYN care team to follow up and advise. FNA RN advised to follow up with clinic with any further questions or concerns. RN advised to call back with any changes or worsening of symptoms. Patient verbalized understanding of and agreement with plan and had no further questions.     Additional Information    Negative: Severe difficulty breathing (e.g., struggling for each breath, speaks in single words)    Negative: Difficult to awaken or acting confused (e.g., disoriented, slurred speech)    Negative: Bluish (or gray) lips or face now    Negative: Shock suspected (e.g., cold/pale/clammy skin, too weak to stand, low BP, rapid pulse)    Negative: Sounds like a life-threatening emergency to the triager    Negative: Chest pain  (Exception: MILD central chest pain, present only when coughing)    Negative: [1] Headache AND [2] stiff neck (can't touch chin to chest)    Negative: Fever > 104 F (40 C)    Negative: [1] Difficulty breathing AND [2] not severe AND [3] not from stuffy nose (e.g., not relieved by cleaning out the nose)    Negative: Patient sounds very sick or weak to the triager    Negative: [1] Fever > 101 F (38.3 C) AND [2] age > 60    Negative: [1] Fever > 100.0 F (37.8 C) AND [2] bedridden (e.g., nursing home " patient, CVA, chronic illness, recovering from surgery)    Negative: [1] Fever > 100.0 F (37.8 C) AND [2] diabetes mellitus or weak immune system (e.g., HIV positive, cancer chemo, splenectomy, organ transplant, chronic steroids)    Negative: Patient is HIGH RISK (e.g., age > 64 years, pregnant, HIV+, or chronic medical condition)    Earache    Protocols used: INFLUENZA - SEASONAL-A-    Loli Razo RN-BSN  [facility]

## 2021-04-08 NOTE — TELEPHONE ENCOUNTER
Attempted to reach patient. Called and left voicemail for patient to call back. Also recommended trying [email or web address]. or her PCP.

## 2021-04-08 NOTE — PROGRESS NOTES
Chief Complaint:     Chief Complaint   Patient presents with     Pharyngitis     Sx for 2 days. Had a baby 8 days ago.     Headache     Generalized Body Aches     Allergies     Cough     Nasal Congestion       Results for orders placed or performed in visit on 04/08/21   Streptococcus A Rapid Scr w Reflx to PCR     Status: None    Specimen: Throat   Result Value Ref Range    Strep Specimen Description Throat     Streptococcus Group A Rapid Screen Negative NEG^Negative   Influenza A/B antigen     Status: None   Result Value Ref Range    Influenza A/B Agn Specimen Nasal     Influenza A Negative NEG^Negative    Influenza B Negative NEG^Negative       Medical Decision Making:    Differential Diagnosis:  URI Adult/Peds:  Sinusitis, Strep pharyngitis, Viral pharyngitis and Viral upper respiratory illness, COVID        ASSESSMENT    1. Suspected COVID-19 virus infection    2. Influenza-like illness        PLAN    Patient is in no acute distress.    Temp is 98.9 F in clinic today, lung sounds were clear, and O2 sats at 98% on RA.    RST was negative.  We will call with culture results only if positive.  COVID test ordered and swab collected in clinic today.  Rx for Tamiflu given today due to symptoms and sudden onset. Influenza swab was negative.  Rest, Push fluids, vaporizer, elevation of head of bed.  Ibuprofen and or Tylenol for any fever or body aches.  Over the counter cough suppressant- PRN- as discussed.     If symptoms worsen, recheck immediately otherwise follow up with your PCP in 1 week if symptoms are not improving.  Worrisome symptoms discussed with instructions to go to the ED.  Patient given COVID isolation instructions.  Patient verbalized understanding and agreed with this plan.    Droplet precautions were observed during this visit.  PPE was worn by me during the visit.  PPE included gown, double gloves, surgical mask, and face shield.  Vital signs were collected by me as well as any NP, or OP swabs if  needed.      Labs:    Results for orders placed or performed in visit on 04/08/21   Streptococcus A Rapid Scr w Reflx to PCR     Status: None    Specimen: Throat   Result Value Ref Range    Strep Specimen Description Throat     Streptococcus Group A Rapid Screen Negative NEG^Negative   Influenza A/B antigen     Status: None   Result Value Ref Range    Influenza A/B Agn Specimen Nasal     Influenza A Negative NEG^Negative    Influenza B Negative NEG^Negative        Current Meds      Current Outpatient Medications:      docusate sodium (COLACE) 100 MG tablet, Take 100 mg by mouth daily, Disp: , Rfl:      oseltamivir (TAMIFLU) 75 MG capsule, Take 1 capsule (75 mg) by mouth 2 times daily for 5 days, Disp: 10 capsule, Rfl: 0     Prenatal Vit-Fe Fumarate-FA (PRENATAL VITAMIN PO), Take 1 tablet by mouth daily, Disp: , Rfl:      senna-docusate (SENOKOT-S/PERICOLACE) 8.6-50 MG tablet, Take 1 tablet by mouth 2 times daily as needed for constipation, Disp: 60 tablet, Rfl: 0     acetaminophen (TYLENOL) 325 MG tablet, Take 2 tablets (650 mg) by mouth every 6 hours as needed for mild pain Start after Delivery. (Patient not taking: Reported on 4/8/2021), Disp: 100 tablet, Rfl: 0     cyclobenzaprine (FLEXERIL) 10 MG tablet, Take 1 tablet (10 mg) by mouth 3 times daily as needed for muscle spasms (Patient not taking: Reported on 3/24/2021), Disp: 30 tablet, Rfl: 1     ferrous sulfate (FEROSUL) 325 (65 Fe) MG tablet, Take 1 tablet (325 mg) by mouth daily (with breakfast) (Patient not taking: Reported on 4/8/2021), Disp: 60 tablet, Rfl: 1     omeprazole (PRILOSEC) 20 MG DR capsule, Take 1 capsule (20 mg) by mouth daily (Patient not taking: Reported on 4/8/2021), Disp: 90 capsule, Rfl: 3  No current facility-administered medications for this visit.     Facility-Administered Medications Ordered in Other Visits:      bupivacaine (MARCAINE) 0.125 % injection (diluted from stock concentration by MD or CRNA), , EPIDURAL, PRN, Goelkel  Marii Sigala MD, 10 mL at 03/31/21 0140     fentaNYL (PF) (SUBLIMAZE) injection, , EPIDURAL, PRN, Marii Verma MD, 16 mcg at 03/31/21 0140     lidocaine-EPINEPHrine 1.5 %-1:[id number] injection, , EPIDURAL, PRN, Marii Verma MD, 3 mL at 03/31/21 0135      Respiratory History    no history of pneumonia or bronchitis      SUBJECTIVE    HPI: Jenae Renee is an 30 year old female who presents with cough nonproductive, ear pressure, fatigue, fever, headache, nasal congestion and sore throat.  Symptoms began 2  days ago and have unchanged.  There is no shortness of breath or chest pain. Patient is eating and drinking well.  No nausea, vomiting, or diarrhea.    Patient states other members of her household are sick with the same symptoms.Patient denies any recent travel or exposure to know COVID positive tested individual.  Patient is not a healthcare worker or .    ROS:     Review of Systems   Constitutional: Negative for chills and fever.   HENT: Positive for sore throat. Negative for congestion, ear pain and rhinorrhea.    Eyes: Negative.    Respiratory: Positive for cough. Negative for shortness of breath.    Cardiovascular: Negative.  Negative for chest pain and palpitations.   Gastrointestinal: Negative for diarrhea, nausea and vomiting.   Endocrine: Negative.    Genitourinary: Negative.    Musculoskeletal: Positive for myalgias. Negative for arthralgias, back pain, joint swelling, neck pain and neck stiffness.   Skin: Negative.  Negative for rash and wound.   Allergic/Immunologic: Negative.  Negative for immunocompromised state.   Neurological: Positive for headaches. Negative for dizziness, weakness and light-headedness.         Family History   Family History   Problem Relation Age of Onset     Diabetes Father      Hypertension Father         Problem history  Patient Active Problem List   Diagnosis     Encounter for supervision of normal pregnancy in multigravida - S MD pt      Subclinical hyperthyroidism     Anemia during pregnancy in third trimester     Need for Tdap vaccination     Overweight (BMI 25.0-29.9)     Preventative health care     Tinea cruris     Clinical diagnosis of COVID-19     LGA at 36 weeks 90th %ile and AC >97th %ile     Encounter for triage in pregnant patient     Labor and delivery indication for care or intervention      (normal spontaneous vaginal delivery)        Allergies  Allergies   Allergen Reactions     Nsaids Other (See Comments)     States she had a duodenal ulcer and her MD told her never to take NSAIDS.        Social History  Social History     Socioeconomic History     Marital status:      Spouse name: Dina      Number of children: 1     Years of education: 16     Highest education level: Bachelor's degree (e.g., BA, AB, BS)   Occupational History     Not on file   Social Needs     Financial resource strain: Not on file     Food insecurity     Worry: Not on file     Inability: Not on file     Transportation needs     Medical: Not on file     Non-medical: Not on file   Tobacco Use     Smoking status: Never Smoker     Smokeless tobacco: Never Used   Substance and Sexual Activity     Alcohol use: No     Drug use: No     Sexual activity: Yes     Partners: Male     Birth control/protection: None   Lifestyle     Physical activity     Days per week: Not on file     Minutes per session: Not on file     Stress: Not on file   Relationships     Social connections     Talks on phone: Not on file     Gets together: Not on file     Attends Oriental orthodox service: Not on file     Active member of club or organization: Not on file     Attends meetings of clubs or organizations: Not on file     Relationship status: Not on file     Intimate partner violence     Fear of current or ex partner: Not on file     Emotionally abused: Not on file     Physically abused: Not on file     Forced sexual activity: Not on file   Other Topics Concern     Not on file   Social  History Narrative     Not on file        OBJECTIVE     Vital signs reviewed by Rafael Johnson PA-C  /74 (BP Location: Right arm, Patient Position: Sitting, Cuff Size: Adult Regular)   Pulse 88   Temp 98.9  F (37.2  C) (Oral)   Resp 14   LMP 06/18/2020   SpO2 98%      Physical Exam  Vitals signs and nursing note reviewed.   Constitutional:       General: She is not in acute distress.     Appearance: She is well-developed. She is not ill-appearing, toxic-appearing or diaphoretic.   HENT:      Head: Normocephalic and atraumatic.      Right Ear: Hearing, tympanic membrane, ear canal and external ear normal. Tympanic membrane is not perforated, erythematous, retracted or bulging.      Left Ear: Hearing, tympanic membrane, ear canal and external ear normal. Tympanic membrane is not perforated, erythematous, retracted or bulging.      Nose: Mucosal edema and congestion present. No rhinorrhea.      Right Sinus: No maxillary sinus tenderness or frontal sinus tenderness.      Left Sinus: No maxillary sinus tenderness or frontal sinus tenderness.      Mouth/Throat:      Pharynx: Posterior oropharyngeal erythema present. No pharyngeal swelling, oropharyngeal exudate or uvula swelling.      Tonsils: No tonsillar exudate or tonsillar abscesses. 0 on the right. 0 on the left.   Eyes:      General:         Right eye: No discharge.         Left eye: No discharge.      Pupils: Pupils are equal, round, and reactive to light.   Neck:      Musculoskeletal: Normal range of motion and neck supple.   Cardiovascular:      Rate and Rhythm: Normal rate and regular rhythm.      Heart sounds: Normal heart sounds. No murmur. No friction rub. No gallop.    Pulmonary:      Effort: Pulmonary effort is normal. No respiratory distress.      Breath sounds: Normal breath sounds. No decreased breath sounds, wheezing, rhonchi or rales.   Chest:      Chest wall: No tenderness.   Abdominal:      General: Bowel sounds are normal. There is no  distension.      Palpations: Abdomen is soft. There is no mass.      Tenderness: There is no abdominal tenderness. There is no guarding.   Lymphadenopathy:      Head:      Right side of head: No submental, submandibular, tonsillar, preauricular or posterior auricular adenopathy.      Left side of head: No submental, submandibular, tonsillar, preauricular or posterior auricular adenopathy.      Cervical: No cervical adenopathy.      Right cervical: No superficial or posterior cervical adenopathy.     Left cervical: No superficial or posterior cervical adenopathy.   Skin:     General: Skin is warm and dry.      Findings: No rash.   Neurological:      Mental Status: She is alert and oriented to person, place, and time.      Cranial Nerves: No cranial nerve deficit.      Deep Tendon Reflexes: Reflexes are normal and symmetric.   Psychiatric:         Behavior: Behavior normal. Behavior is cooperative.         Thought Content: Thought content normal.         Judgment: Judgment normal.                      Rafael Johnson PA-C  4/8/2021, 4:50 PM

## 2021-04-09 LAB
LABORATORY COMMENT REPORT: NORMAL
SARS-COV-2 RNA RESP QL NAA+PROBE: NEGATIVE
SARS-COV-2 RNA RESP QL NAA+PROBE: NORMAL
SPECIMEN SOURCE: NORMAL
STREP GROUP A PCR: NOT DETECTED

## 2021-04-24 ENCOUNTER — HEALTH MAINTENANCE LETTER (OUTPATIENT)
Age: 31
End: 2021-04-24

## 2021-04-29 ENCOUNTER — OFFICE VISIT (OUTPATIENT)
Dept: OBGYN | Facility: CLINIC | Age: 31
End: 2021-04-29
Payer: COMMERCIAL

## 2021-04-29 ENCOUNTER — ALLIED HEALTH/NURSE VISIT (OUTPATIENT)
Dept: NURSING | Facility: CLINIC | Age: 31
End: 2021-04-29
Payer: COMMERCIAL

## 2021-04-29 VITALS
SYSTOLIC BLOOD PRESSURE: 103 MMHG | TEMPERATURE: 98 F | WEIGHT: 205 LBS | DIASTOLIC BLOOD PRESSURE: 71 MMHG | HEART RATE: 89 BPM | BODY MASS INDEX: 30.27 KG/M2

## 2021-04-29 DIAGNOSIS — B36.9 FUNGAL SKIN DISEASE: Primary | ICD-10-CM

## 2021-04-29 DIAGNOSIS — L29.2 VULVAR ITCHING: ICD-10-CM

## 2021-04-29 DIAGNOSIS — Z23 ENCOUNTER FOR IMMUNIZATION: Primary | ICD-10-CM

## 2021-04-29 PROCEDURE — 90471 IMMUNIZATION ADMIN: CPT

## 2021-04-29 PROCEDURE — 87529 HSV DNA AMP PROBE: CPT | Performed by: ADVANCED PRACTICE MIDWIFE

## 2021-04-29 PROCEDURE — 90746 HEPB VACCINE 3 DOSE ADULT IM: CPT

## 2021-04-29 PROCEDURE — 90472 IMMUNIZATION ADMIN EACH ADD: CPT

## 2021-04-29 PROCEDURE — G0463 HOSPITAL OUTPT CLINIC VISIT: HCPCS

## 2021-04-29 PROCEDURE — 90716 VAR VACCINE LIVE SUBQ: CPT

## 2021-04-29 PROCEDURE — 99207 PR NO CHARGE NURSE ONLY: CPT

## 2021-04-29 PROCEDURE — 99214 OFFICE O/P EST MOD 30 MIN: CPT | Mod: 24 | Performed by: ADVANCED PRACTICE MIDWIFE

## 2021-04-29 RX ORDER — TERCONAZOLE 0.4 %
1 CREAM WITH APPLICATOR VAGINAL 2 TIMES DAILY
Qty: 45 G | Refills: 0 | Status: SHIPPED | OUTPATIENT
Start: 2021-04-29 | End: 2021-04-29 | Stop reason: ALTCHOICE

## 2021-04-29 RX ORDER — MICONAZOLE NITRATE 20 MG/G
CREAM TOPICAL 2 TIMES DAILY
Qty: 35 G | Refills: 0 | Status: SHIPPED | OUTPATIENT
Start: 2021-04-29 | End: 2021-05-09

## 2021-04-29 ASSESSMENT — PAIN SCALES - GENERAL: PAINLEVEL: NO PAIN (0)

## 2021-04-29 NOTE — NURSING NOTE
Del 3-31-21    A bot-  Noticed 3 days ago a rash developed lower abdomin-  Itchy and bumpy  Started on right side-  Now it is spreading down.  Also had a rash on her labia  For the last week or two   Itchy  Still having post partum discharge unsure if she has other discharge.

## 2021-04-29 NOTE — PROGRESS NOTES
Prior to immunization administration, verified patients identity using patient s name and date of birth. Please see Immunization Activity for additional information.     Screening Questionnaire for Adult Immunization    Are you sick today?   No   Do you have allergies to medications, food, a vaccine component or latex?   No   Have you ever had a serious reaction after receiving a vaccination?   No   Do you have a long-term health problem with heart, lung, kidney, or metabolic disease (e.g., diabetes), asthma, a blood disorder, no spleen, complement component deficiency, a cochlear implant, or a spinal fluid leak?  Are you on long-term aspirin therapy?   No   Do you have cancer, leukemia, HIV/AIDS, or any other immune system problem?   No   Do you have a parent, brother, or sister with an immune system problem?   No   In the past 3 months, have you taken medications that affect  your immune system, such as prednisone, other steroids, or anticancer drugs; drugs for the treatment of rheumatoid arthritis, Crohn s disease, or psoriasis; or have you had radiation treatments?   No   Have you had a seizure, or a brain or other nervous system problem?   No   During the past year, have you received a transfusion of blood or blood    products, or been given immune (gamma) globulin or antiviral drug?   No   For women: Are you pregnant or is there a chance you could become       pregnant during the next month?   No   Have you received any vaccinations in the past 4 weeks?   No     Immunization questionnaire answers were all negative.        Patient instructed to remain in clinic for 15 minutes afterwards, and to report any adverse reaction to me immediately.       Screening performed by Bhavya Rodriguez CMA on 4/29/2021 at 4:10 PM.

## 2021-04-29 NOTE — PROGRESS NOTES
SUBJECTIVE  30 year old  presents for problem visit.  She is 4 week post partum visit s/p  delivery on 3/31/21.  - Pt noticed dry/tiching patch of skin on her mons pubis ~a week or so ago that is consistent with a fungal infection/jock itch that she had treated with oral anti fungal x1( presumbaly diflucan).   She changed to cotton pads only and it started to subside but then she felt like it was spreading.  R labia there are bumps and itching. Never had bumps before. Her partner doesn't have a history of bumps. Has not had sex since delivery.   Doesn't think she's ever had herpes. She just shaved her pubic hair and it started.     Breastfeeding established. Lochia now pink tinged and spotting/irregular.  Coping well with support from family.       - Early April family was sick, COVID negative.   - Pt was a MD patient in labor. Was taken to OR for fetal deceleration but was able to have vaginal delivery. Had 2nd degree laceration repaired.   - Gestational Hypertension,BP stable today.   - Varicella and Hep B vaccine series deferred until clinic per pt . Will consider at 6 week P visit.   - Bith Control - abstinence at present, will consider.          OBJECTIVE  General- no apparent distress  External Genitalia - No hair - shaved.  Excoriation on R labia with cluster of round scabbed over area. No papules or vesicles.   Patchy area c/w fungal infection upper mons pubis.  Dry skin patches on BL buttock.    /71   Pulse 89   Temp 98  F (36.7  C)   Wt 93 kg (205 lb)   LMP 2020   BMI 30.27 kg/m        ASSESSMENT/PLAN  4 weeks postpartum s/p  delivery - problem visit  - Reviewed not classic herpes presentations as non vesicular reported by pt but is cluster of small round scabs. HSV culture attempted per pt request, unsure if enough sample.  Pt declines serum testing - will consider at next visit prn.  - Topical antifungal RX given.  - RTO in 2 weeks for 6 week check and follow up itching  and prn if questions or concerns    - All pt's questions were discussed and answered.  Pt verbalized understanding of and agreement to plan of care.     30 minutes spent on the date of the encounter doing chart review, history and exam, documentation and further activities per the note    JANELLE GomezM

## 2021-04-30 LAB
HSV1 DNA SPEC QL NAA+PROBE: NOT DETECTED
HSV2 DNA SPEC QL NAA+PROBE: NOT DETECTED
LABORATORY COMMENT REPORT: NORMAL
SPECIMEN SOURCE: NORMAL

## 2021-05-07 ENCOUNTER — TELEPHONE (OUTPATIENT)
Dept: OBGYN | Facility: CLINIC | Age: 31
End: 2021-05-07

## 2021-05-07 NOTE — TELEPHONE ENCOUNTER
----- Message from Keila Haq sent at 5/6/2021  1:33 PM CDT -----  Regarding: Symptoms: Vaginal Bleeding  Hello!    Shanala calling to report that she gave birth 5 weeks ago and she is bleeding vaginally. She is very nervous and states this has been happening for about four days. Please give her a call to discuss.    Thank you!  Keila HODGSON    Please DO NOT send this message and/or reply back to sender. Call center representatives DO NOT respond to messages.

## 2021-08-06 ENCOUNTER — VIRTUAL VISIT (OUTPATIENT)
Dept: FAMILY MEDICINE | Facility: CLINIC | Age: 31
End: 2021-08-06
Payer: COMMERCIAL

## 2021-08-06 DIAGNOSIS — H10.33 ACUTE BACTERIAL CONJUNCTIVITIS OF BOTH EYES: Primary | ICD-10-CM

## 2021-08-06 PROCEDURE — 99213 OFFICE O/P EST LOW 20 MIN: CPT | Mod: 95 | Performed by: NURSE PRACTITIONER

## 2021-08-06 RX ORDER — POLYMYXIN B SULFATE AND TRIMETHOPRIM 1; 10000 MG/ML; [USP'U]/ML
1-2 SOLUTION OPHTHALMIC EVERY 6 HOURS
Qty: 10 ML | Refills: 0 | Status: SHIPPED | OUTPATIENT
Start: 2021-08-06 | End: 2021-11-29

## 2021-08-06 ASSESSMENT — ENCOUNTER SYMPTOMS
EYE ITCHING: 1
EYE DISCHARGE: 1
EYE REDNESS: 1

## 2021-08-06 NOTE — PROGRESS NOTES
Jenae is a 30 year old who is being evaluated via a billable video visit.    Send text link to [phone]   How would you like to obtain your AVS? MyChart  If the video visit is dropped, the invitation should be resent by: Text to cell phone: [phone]  Will anyone else be joining your video visit? No    Video Start Time: 11:21 AM    Assessment & Plan     1. Acute bacterial conjunctivitis of both eyes  - avoid eye make-up.  Cool compress to eyes prn itching/irritation.  Use separate cloths to prevent cross contamination. Stressed hand washing.   - trimethoprim-polymyxin b (POLYTRIM) 32260-1.1 UNIT/ML-% ophthalmic solution; Place 1-2 drops into both eyes every 6 hours  Dispense: 10 mL; Refill: 0      Return in about 1 week (around 8/13/2021) for worsening symptoms or failure to improve.    JANELLE Johnson CNP  [facility]    Subjective   Jenae is a 30 year old who presents for the following health issues     HPI     Eye(s) Problem  Onset/Duration: 2 days  Description:   Location: both  Pain: no  Redness: YES  Accompanying Signs & Symptoms:  Discharge/mattering: YES  Swelling: no  Visual changes: no  Fever: no  Nasal Congestion: no  Bothered by bright lights: no  History:  Trauma: no  Foreign body exposure: no  Wearing contacts: no  Precipitating or alleviating factors: None  Therapies tried and outcome: allergy eye drops    Reports yellow crusting/drainage worse in the morning and eyes were more red at that time.  Thought initially it was due to allergies and tried allergy eye drops with increased burning and irritation.         Review of Systems   Eyes: Positive for discharge, redness and itching.            Objective           Vitals:  No vitals were obtained today due to virtual visit.    Physical Exam   GENERAL: Healthy, alert and no distress  EYES: Eyes grossly normal to inspection.  No discharge.  Mild erythematous conjunctiva bilaterally  RESP: No audible wheeze, cough, or  visible cyanosis.  No visible retractions or increased work of breathing.    SKIN: Visible skin clear. No significant rash, abnormal pigmentation or lesions.  NEURO: Cranial nerves grossly intact.  Mentation and speech appropriate for age.  PSYCH: Mentation appears normal, affect normal/bright, judgement and insight intact, normal speech and appearance well-groomed.            Video-Visit Details    Type of service:  Video Visit    Video End Time:11:26 AM    Originating Location (pt. Location): Home    Distant Location (provider location):  [facility]     Platform used for Video Visit: Skin Scan

## 2021-08-09 ENCOUNTER — TELEPHONE (OUTPATIENT)
Dept: OBGYN | Facility: CLINIC | Age: 31
End: 2021-08-09

## 2021-08-09 NOTE — TELEPHONE ENCOUNTER
Spoke with patient's sister to notify that appointment with Dr. Bazzi tomorrow (8/10/21) needs to be rescheduled due to provider being out of office unexpectedly. Sister states that patient has clinic phone number and that message will be given to patient regarding appointment cancellation. Appointment cancelled.

## 2021-09-23 ENCOUNTER — VIRTUAL VISIT (OUTPATIENT)
Dept: FAMILY MEDICINE | Facility: CLINIC | Age: 31
End: 2021-09-23
Payer: COMMERCIAL

## 2021-09-23 DIAGNOSIS — R50.9 FEVER AND CHILLS: Primary | ICD-10-CM

## 2021-09-23 DIAGNOSIS — M79.10 MYALGIA: ICD-10-CM

## 2021-09-23 PROCEDURE — 99213 OFFICE O/P EST LOW 20 MIN: CPT | Mod: 95 | Performed by: STUDENT IN AN ORGANIZED HEALTH CARE EDUCATION/TRAINING PROGRAM

## 2021-09-23 NOTE — PROGRESS NOTES
Jenae is a 31 year old who is being evaluated via a billable telephone visit.      What phone number would you like to be contacted at? [phone]  How would you like to obtain your AVS? MyChart    Assessment & Plan     Fever and chills  Will evaluate for COVID and influenza due to symptoms (fever, body aches, upper resp symptoms). Does work in a hospital setting. She has received Covid vaccine previously. Covid and influenza PCR ordered. Refrain from working, isolate, until tests return and symptoms improve. Continue symptomatic care including rest, ibuprofen/tylenol, encourage fluids.   - Symptomatic COVID-19 Virus (Coronavirus) by PCR; Future  - Influenza A and B     Myalgia  See above  - Symptomatic COVID-19 Virus (Coronavirus) by PCR; Future  - Influenza A and B     Return if symptoms worsen or fail to improve.    Nyasia Pugh,   [facility]   Jenae is a 31 year old who presents for the following health issues     HPI     * discuss COVID and flu tests for work, no symptoms reported   - she would like to get a PCR covid test.   - she had symptoms for the past 4 days but today is a little better.   - went to work today and they told her she needs to get tested before she goes back  - mom also had symptoms recently and they live together, her mom tested negative  - she had body aches, fever, runny nose, sore throats.  - no loss of taste or smell  - very infrequent coughing  - no SOB, no chest tightness    - did feel SOB after walking a flight of stairs today  - she is an IMG and works at [employer]     Review of Systems   Constitutional, HEENT, cardiovascular, pulmonary, gi and gu systems are negative, except as otherwise noted.      Objective           Vitals:  No vitals were obtained today due to virtual visit.    Physical Exam   alert and no distress  PSYCH: Alert and oriented times 3; coherent speech, normal   rate and volume, able to articulate logical thoughts, able    to abstract reason, no tangential thoughts, no hallucinations   or delusions  Her affect is normal  RESP: No cough, no audible wheezing, able to talk in full sentences, does have a horse voice  Remainder of exam unable to be completed due to telephone visits        Phone call duration: 6 minutes

## 2021-09-23 NOTE — PATIENT INSTRUCTIONS
Patient Education     COVID-19 and the Flu: What's the Difference?  Flu season happens every year in the [place] in the fall and winter. COVID-19 has similar symptoms. How do you know if someone has the flu or COVID-19? What are the differences between these 2 illnesses? Can you get both at the same time? See how they compare below.  Can you get the flu and COVID-19 at the same time?  Yes, medical experts say that you can have both infections at the same time.       Flu (influenza) COVID-19   Cause Different types of flu viruses that spread each year A type of coronavirus called SARS-CoV-2   How it spreads It spreads from person to person through coughing, sneezing, talking, or touching infected surfaces and touching your eyes, nose, or mouth It spreads from person to person through coughing, sneezing, talking, or touching infected surfaces and touching your eyes, nose, or mouth. Current research shows that it spreads more easily than the flu, but not as easily as measles.   Prevention Wash your hands often, stay home if you feel ill, limit contact with other people, and avoid people who are sick. Wash your hands often, stay home if you feel ill, limit contact with other people, and avoid people who are sick. Wear a face mask when around other people. Stay 6 feet or more away from others in public places. Follow instructions in your area for avoiding crowds and events.   Vaccine Different types of flu vaccines are available each year. Getting a vaccine can help prevent or lessen symptoms of the flu. Talk with your healthcare provider about the type of vaccine that s best for you and when to get it. If you are sick with any infection, get the flu vaccine as soon as you recover. The FDA has approved vaccines to prevent COVID-19 in people older than 18 (one vaccine has been approved for people as young as 16). The vaccines are not currently available to the entire public, but are being rolled out in phases. The first  phase of vaccine roll-out will go to healthcare staff and residents of long-term care facilities. Other risk groups will be added regularly. Check your local community's roll-out plans. The vaccines are given as a shot (injection) in the arm muscle. Two doses are needed. The second dose is given several weeks after the first.   Symptoms They can be mild to severe, and can include:    Fever    Chills    Body aches    Cough    Sore throat    Stuffy or runny nose    Headache    Tiredness    Vomiting and diarrhea, more often in children Some people have no symptoms. In other people, they can be mild to severe, and can include:    Fever    Chills    Body aches    Cough    Sore throat    Stuffy or runny nose    Headache    Tiredness    Feeling short of breath    New loss of taste or smell    Nausea    Vomiting    Diarrhea   When symptoms start Usually 1 to 4 days after infection Usually 5 days after infection, but can start 2 to 14 days after infection   How long a person is contagious A person can spread the flu at least 1 day before symptoms start. Older kids and adults are most contagious for the first 3 to 4 days of symptoms. They can spread the virus up to 7 days after symptoms start. Babies and people with weak immune systems can be contagious longer Researchers are still learning about this. It s possible for a person to spread the virus about 2 days before symptoms start. They can spread the virus up to 10 days after symptoms start. People with no symptoms (asymptomatic) or who had symptoms that have gone away can still spread the virus for at least 10 days after testing positive for COVID-19 with a viral test.   Testing There are different kinds of rapid flu tests. The tests are done by wiping a swab inside your nose or throat. The results can come back in 10 minutes to several hours. A saliva-based test is being developed.  In some areas, a single test from the CDC for both flu and SARS CoV-2 may be available.  This test is used to help public health experts track infection rates. It won t replace separate flu and COVID-19 tests. There are different kinds of tests for COVID-19. Some check for active infection. Others check for antibodies in the blood that are a sign of a past COVID-19 infection.  In some areas, a single test from the [facility] for both flu and SARS CoV-2 may be available. This test is used to help public health experts track infection rates. It won t replace separate flu and COVID-19 tests.   Treatment The flu may be treated with antiviral medicine. This can help ease symptoms. It can also shorten the amount of time you re sick. These medicines need to be taken as soon as possible when you start to feel sick. Antibiotics are not used because they don t work on the flu virus. But antibiotics may be used to prevent or treat an infection by bacteria that can sometimes happen after having the flu. Other treatment for the flu includes care to ease symptoms. This includes rest, drinking plenty of fluids, and pain and fever medicine as needed. In severe cases, you may need time in the hospital to treat complications from flu. The FDA has approved an antiviral medicine called remdesivir for people in the hospital. It is for people 12 years and older who weigh more than about 88 pounds (40 kgs). Remdesivir is approved only for people who need to be treated in the hospital. In certain cases, it may also be used for people younger than 12 years or who weigh less than about 88 pounds (40 kgs).  Antibiotics are not used because they don t work on the virus that causes COVID-19. But antibiotics may be used to prevent or treat an infection by bacteria that may happen after getting COVID-19.   Possible complications Can include:    Bacterial infections    Ear infection    Inflammation of muscle tissues (myositis or rhabdomyolysis)    Inflammation of the brain (encephalitis)    Inflammation of the heart  (myocarditis)    Multiple-organ failure    Pneumonia    Sepsis    Sinus infection    Worsening of chronic conditions of the lungs, heart, and nervous system    Worsening of diabetes Can include:    Acute respiratory distress syndrome (ARDS)    Bacterial infections    Heart attack    Inflammation of muscle tissues (myositis or rhabdomyolysis)    Inflammation of the brain (encephalitis)    Inflammation of the heart (myocarditis)    Multiple-organ failure    Pneumonia    Respiratory failure    Sepsis    Stroke    Worsening of chronic conditions of the lungs, heart, and nervous system    Worsening of diabetes    Multisystem inflammatory syndrome in children (MIS-C), a rare complication that causes inflammation of blood vessels and organs      Why getting a flu vaccine is important now  A flu vaccine doesn t protect you from COVID-19. But it can reduce your risk of serious illness or death from the flu. The flu vaccine may help keep you out of the hospital. This is extra important while the COVID-19 pandemic is using a lot of medical resources.  Date last modified: 1/19/2021  [employer] last reviewed this educational content on 8/1/2020 2000-2021 [employer]. All rights reserved. This information is not intended as a substitute for professional medical care. Always follow your healthcare professional's instructions.

## 2021-09-24 ENCOUNTER — TELEPHONE (OUTPATIENT)
Dept: FAMILY MEDICINE | Facility: CLINIC | Age: 31
End: 2021-09-24

## 2021-09-24 ENCOUNTER — LAB (OUTPATIENT)
Dept: URGENT CARE | Facility: URGENT CARE | Age: 31
End: 2021-09-24
Payer: COMMERCIAL

## 2021-09-24 DIAGNOSIS — R50.9 FEVER AND CHILLS: ICD-10-CM

## 2021-09-24 DIAGNOSIS — M79.10 MYALGIA: ICD-10-CM

## 2021-09-24 PROCEDURE — 99207 PR NO CHARGE LOS: CPT

## 2021-09-24 PROCEDURE — U0005 INFEC AGEN DETEC AMPLI PROBE: HCPCS

## 2021-09-24 PROCEDURE — U0003 INFECTIOUS AGENT DETECTION BY NUCLEIC ACID (DNA OR RNA); SEVERE ACUTE RESPIRATORY SYNDROME CORONAVIRUS 2 (SARS-COV-2) (CORONAVIRUS DISEASE [COVID-19]), AMPLIFIED PROBE TECHNIQUE, MAKING USE OF HIGH THROUGHPUT TECHNOLOGIES AS DESCRIBED BY CMS-2020-01-R: HCPCS

## 2021-09-24 NOTE — TELEPHONE ENCOUNTER
I was notified from the COVID testing team that no sites are doing influenza tests until mid-October. I had placed an order at our virtual visit yesterday for a covid plus influenza testing. Could you please inform the patient that we are unable to test for influenza?   Thanks!    Nyasia Pugh, DO

## 2021-09-24 NOTE — TELEPHONE ENCOUNTER
RN left  for patient at [phone] requesting call back to clinic.    RN called to advise [place] currently not doing Flu testing until October.    Schuyler Vázquez RN, BSN, PHN  [facility]

## 2021-09-25 LAB — SARS-COV-2 RNA RESP QL NAA+PROBE: NEGATIVE

## 2021-09-30 ENCOUNTER — ALLIED HEALTH/NURSE VISIT (OUTPATIENT)
Dept: FAMILY MEDICINE | Facility: CLINIC | Age: 31
End: 2021-09-30
Payer: COMMERCIAL

## 2021-09-30 DIAGNOSIS — Z23 ENCOUNTER FOR IMMUNIZATION: Primary | ICD-10-CM

## 2021-09-30 PROCEDURE — 90716 VAR VACCINE LIVE SUBQ: CPT

## 2021-09-30 PROCEDURE — 99207 PR NO CHARGE NURSE ONLY: CPT

## 2021-09-30 PROCEDURE — 90746 HEPB VACCINE 3 DOSE ADULT IM: CPT

## 2021-09-30 PROCEDURE — 90471 IMMUNIZATION ADMIN: CPT

## 2021-09-30 PROCEDURE — 90472 IMMUNIZATION ADMIN EACH ADD: CPT

## 2021-09-30 NOTE — PROGRESS NOTES
Prior to immunization administration, verified patients identity using patient s name and date of birth. Please see Immunization Activity for additional information.     Screening Questionnaire for Adult Immunization    Are you sick today?   No   Do you have allergies to medications, food, a vaccine component or latex?   No   Have you ever had a serious reaction after receiving a vaccination?   No   Do you have a long-term health problem with heart, lung, kidney, or metabolic disease (e.g., diabetes), asthma, a blood disorder, no spleen, complement component deficiency, a cochlear implant, or a spinal fluid leak?  Are you on long-term aspirin therapy?   No   Do you have cancer, leukemia, HIV/AIDS, or any other immune system problem?   No   Do you have a parent, brother, or sister with an immune system problem?   No   In the past 3 months, have you taken medications that affect  your immune system, such as prednisone, other steroids, or anticancer drugs; drugs for the treatment of rheumatoid arthritis, Crohn s disease, or psoriasis; or have you had radiation treatments?   No   Have you had a seizure, or a brain or other nervous system problem?   No   During the past year, have you received a transfusion of blood or blood    products, or been given immune (gamma) globulin or antiviral drug?   No   For women: Are you pregnant or is there a chance you could become       pregnant during the next month?   No   Have you received any vaccinations in the past 4 weeks?   No     Immunization questionnaire answers were all negative.        Per orders of Dr. Ordoñez, injection of Hepatitis B and Varicella  given by Marci Neumann MA. Patient instructed to remain in clinic for 15 minutes afterwards, and to report any adverse reaction to me immediately.       Screening performed by Marci Neumann MA on 9/30/2021 at 1:19 PM.

## 2021-10-07 NOTE — PROGRESS NOTES
GYN Problem Visit                                                 CC:  Breast pain    HPI:  Lee Renee is a 31 year old female who presents to clinic today complaining of breast pain.     on 3/31/21.  Tried breast-feeding for about 1.5-month, but did not end up working out and she is now formula feeding.    Bilateral breast pain, worse on the right than the left.  Notes that it is worse just deep to the areola, but does have some pain at lateral breasts bilaterally.  Did have breast pain in the past, but it was more cyclic, associated with menstrual cycles, and was told that she had fibrocystic breasts.  This time pain is not cyclic, more constant.  Feels very different to her.  Has not had breast imaging in the past    Past Medical History:   Diagnosis Date     Ulcer, gastric, acute     Duodenal ulcer.     History reviewed. No pertinent surgical history.  OB History    Para Term  AB Living   4 2 2 0 2 2   SAB TAB Ectopic Multiple Live Births   2 0 0 0 2      # Outcome Date GA Lbr Usman/2nd Weight Sex Delivery Anes PTL Lv   4 Term 21 40w6d 08:20 / 01:29 3.87 kg (8 lb 8.5 oz) M Vag-Spont EPI N KELECHI      Complications: Fetal Intolerance      Name: SUSANNE,MALE-LEE      Apgar1: 9  Apgar5: 9   3 SAB            2 Term 19 39w0d 04:59 / 00:38 2.76 kg (6 lb 1.4 oz) F Vag-Spont EPI N KELECHI      Name: Mitzi       Apgar1: 9  Apgar5: 9   1 SAB  7w0d             Obstetric Comments   IOL due to abnormal FHT cat 2  Baby did well          ROS:  No nipple discharge.  No overlying skin changes  No breast masses.    EXAM:  Blood pressure 110/70, pulse 96, weight 93.9 kg (207 lb), last menstrual period 10/04/2021, SpO2 95 %, not currently breastfeeding.   BMI= Body mass index is 30.57 kg/m .  General - pleasant female in no acute distress.  Breast - no nodularity, skin changes, asymmetry or nipple discharge bilaterally. No axillary or supraclavicular lymphadenopathy.  Musculoskeletal - no gross  deformities.  Neurological - normal strength, sensation, and mental status.      Assessment:    Jenae Renee is a 31 year old  who presents today to discuss breast pain.    Plan:  1. Breast pain  Discussed breast pain is typically benign, especially when cyclic.  While she's had cyclic breast pain in the past, she's worried that this is constant.  Ordered diagnostic breast imaging.  If imaging negative and breast pain continues, she'll let me know via [employer] message and I'll talk with our lactation consultant to see if appointment with her would be reasonable.  - MA Diagnostic Digital Bilateral; Future  - US Breast Bilateral Complete 4 Quadrants; Future        Eliz Banuelos MD

## 2021-10-08 ENCOUNTER — OFFICE VISIT (OUTPATIENT)
Dept: OBGYN | Facility: CLINIC | Age: 31
End: 2021-10-08
Payer: COMMERCIAL

## 2021-10-08 VITALS
OXYGEN SATURATION: 95 % | SYSTOLIC BLOOD PRESSURE: 110 MMHG | BODY MASS INDEX: 30.57 KG/M2 | DIASTOLIC BLOOD PRESSURE: 70 MMHG | HEART RATE: 96 BPM | WEIGHT: 207 LBS

## 2021-10-08 DIAGNOSIS — N64.4 BREAST PAIN: Primary | ICD-10-CM

## 2021-10-08 PROCEDURE — 99203 OFFICE O/P NEW LOW 30 MIN: CPT | Performed by: OBSTETRICS & GYNECOLOGY

## 2021-10-09 ENCOUNTER — HEALTH MAINTENANCE LETTER (OUTPATIENT)
Age: 31
End: 2021-10-09

## 2021-10-14 ENCOUNTER — HOSPITAL ENCOUNTER (EMERGENCY)
Facility: CLINIC | Age: 31
Discharge: HOME | End: 2021-10-14
Attending: EMERGENCY MEDICINE | Admitting: EMERGENCY MEDICINE
Payer: COMMERCIAL

## 2021-10-14 VITALS
HEIGHT: 69 IN | HEART RATE: 60 BPM | OXYGEN SATURATION: 100 % | BODY MASS INDEX: 30.36 KG/M2 | WEIGHT: 205 LBS | SYSTOLIC BLOOD PRESSURE: 106 MMHG | RESPIRATION RATE: 16 BRPM | DIASTOLIC BLOOD PRESSURE: 60 MMHG | TEMPERATURE: 98.5 F

## 2021-10-14 DIAGNOSIS — S61.011A LACERATION OF RIGHT THUMB WITHOUT FOREIGN BODY WITHOUT DAMAGE TO NAIL, INITIAL ENCOUNTER: ICD-10-CM

## 2021-10-14 PROCEDURE — 12001 RPR S/N/AX/GEN/TRNK 2.5CM/<: CPT

## 2021-10-14 PROCEDURE — 99282 EMERGENCY DEPT VISIT SF MDM: CPT | Mod: 25 | Performed by: EMERGENCY MEDICINE

## 2021-10-14 PROCEDURE — 99283 EMERGENCY DEPT VISIT LOW MDM: CPT

## 2021-10-14 PROCEDURE — 12001 RPR S/N/AX/GEN/TRNK 2.5CM/<: CPT | Performed by: EMERGENCY MEDICINE

## 2021-10-14 RX ORDER — LIDOCAINE HYDROCHLORIDE 10 MG/ML
5 INJECTION, SOLUTION INFILTRATION; PERINEURAL ONCE
Status: DISCONTINUED | OUTPATIENT
Start: 2021-10-14 | End: 2021-10-14 | Stop reason: HOSPADM

## 2021-10-14 RX ORDER — LIDOCAINE HYDROCHLORIDE 10 MG/ML
INJECTION, SOLUTION EPIDURAL; INFILTRATION; INTRACAUDAL; PERINEURAL
Status: DISCONTINUED
Start: 2021-10-14 | End: 2021-10-14 | Stop reason: HOSPADM

## 2021-10-14 ASSESSMENT — MIFFLIN-ST. JEOR: SCORE: 1699.25

## 2021-10-14 ASSESSMENT — ENCOUNTER SYMPTOMS
WOUND: 1
NUMBNESS: 1

## 2021-10-14 NOTE — DISCHARGE INSTRUCTIONS
Keep wound clean and dry. Do not submerge in water (bathtub, pool, sink) for the net 48 hours. Do not get laceration wet for the next 24 hours. After that, you may bathe and shower as usual. Apply bacitracin or Vaseline ointment to wound to help with healing. Check wound daily for signs of infection such as increased swelling, redness, drainage, pain, or odor.     Return to the emergency department if you have concerns for wound infection.     Follow up with a primary doctor or urgent care (or in this emergency department) in 10-14 days for suture removal.     Use protective sunscreen on the laceration to minimize scarring. The scar should continue to become more subtle over the course of the next year.

## 2021-10-14 NOTE — ED PROVIDER NOTES
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 Patient was taking dishes out and thinks one of the plates was already broken. It fell down on her hand lacerating the top side of her right thumb near the joint. C/o some numbness at the tip of her thumb. Noted significant bleeding when flexing the thumb. Does not think she fractured or crushed the thumb. She can move it with some pain. Denies foreign body sensation. UTD with TDAP.    Past Medical History  Past Medical History:   Diagnosis Date     Ulcer, gastric, acute     Duodenal ulcer.     History reviewed. No pertinent surgical history.  omeprazole (PRILOSEC) 20 MG DR capsule  Prenatal Vit-Fe Fumarate-FA (PRENATAL VITAMIN PO)  senna-docusate (SENOKOT-S/PERICOLACE) 8.6-50 MG tablet  trimethoprim-polymyxin b (POLYTRIM) 37028-3.1 UNIT/ML-% ophthalmic solution      Allergies   Allergen Reactions     Nsaids Other (See Comments)     States she had a duodenal ulcer and her MD told her never to take NSAIDS.     Family History  Family History   Problem Relation Age of Onset     Diabetes Father      Hypertension Father      Social History   Social History     Tobacco Use     Smoking status: Never Smoker     Smokeless tobacco: Never Used   Substance Use Topics     Alcohol use: No     Drug use: No      Past medical history, past surgical history, medications, allergies, family history, and social history were reviewed with the patient. No additional pertinent items.       Review of Systems   Skin: Positive for wound.   Neurological: Positive for numbness.   All other systems reviewed and are negative.    A complete review of systems was performed with pertinent positives and negatives noted in the HPI, and all other systems negative.    Physical Exam   BP: 110/82  Pulse:  "85  Temp: 98.5  F (36.9  C)  Resp: 16  Height: 175.3 cm (5' 9\")  Weight: 93 kg (205 lb)  SpO2: 99 %  Physical Exam  Vitals and nursing note reviewed.   Constitutional:       General: She is not in acute distress.     Appearance: Normal appearance. She is well-developed. She is not ill-appearing or diaphoretic.   HENT:      Head: Normocephalic and atraumatic.      Nose: Nose normal.      Mouth/Throat:      Mouth: Mucous membranes are moist.   Eyes:      General: No scleral icterus.     Conjunctiva/sclera: Conjunctivae normal.   Cardiovascular:      Rate and Rhythm: Normal rate.   Pulmonary:      Effort: Pulmonary effort is normal. No respiratory distress.      Breath sounds: No stridor.   Abdominal:      General: There is no distension.   Musculoskeletal:         General: No deformity. Normal range of motion.      Right hand: Laceration and tenderness present. No bony tenderness. Normal range of motion. Normal strength. Normal capillary refill.        Hands:       Cervical back: Normal range of motion and neck supple. No rigidity.      Comments: 1 cm laceration along dorsal aspect of right thumb near DIP   Skin:     General: Skin is warm and dry.      Coloration: Skin is not jaundiced or pale.      Findings: No rash.   Neurological:      General: No focal deficit present.      Mental Status: She is alert and oriented to person, place, and time.   Psychiatric:         Mood and Affect: Mood normal.         Behavior: Behavior normal.         Thought Content: Thought content normal.           ED [facility]    -Laceration Repair    Date/Time: 10/14/2021 10:09 AM  Performed by: Jessica Crocker MD  Authorized by: Jessica Crocker MD       ANESTHESIA (see MAR for exact dosages):     Anesthesia method:  Local infiltration    Local anesthetic:  Lidocaine 1% w/o epi  LACERATION DETAILS     Location:  Finger    Finger location:  R thumb    Length (cm):  1    Depth (mm):  " 1    REPAIR TYPE:     Repair type:  Simple      EXPLORATION:     Wound exploration: wound explored through full range of motion and entire depth of wound probed and visualized      Wound extent: no foreign body, no nerve damage, no tendon damage and no underlying fracture      Contaminated: no      TREATMENT:     Area cleansed with:  Saline    Amount of cleaning:  Standard    Irrigation solution:  Sterile saline    Irrigation method:  Syringe    SKIN REPAIR     Repair method:  Sutures    Suture size:  4-0    Suture material:  Nylon    Number of sutures:  4    APPROXIMATION     Approximation:  Close    POST-PROCEDURE DETAILS     Dressing:  Antibiotic ointment and sterile dressing      PROCEDURE   Patient Tolerance:  Patient tolerated the procedure well with no immediate complications                    No results found for any visits on 10/14/21.  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    Ddx: laceration, foreign body, nerve injury    Wound irrigated and inspected. No foreign body. No nerve or tendon injury. No fracture. Closed with nylon suture. TDAP UTD. Follow up in 10-14 days for suture removal with PCP. Return precautions provided.         I have reviewed the nursing notes. I have reviewed the findings, diagnosis, plan and need for follow up with the patient.    New Prescriptions    No medications on file       Final diagnoses:   Laceration of right thumb without foreign body without damage to nail, initial encounter       --  Jessica Crocker  [facility] EMERGENCY DEPARTMENT  10/14/2021     Jessica Crocker MD  10/14/21 [id number]

## 2021-10-14 NOTE — ED TRIAGE NOTES
Pt comes in after cutting right thumb on dish. Numbness near base of thumb, able to move thumb, blood has stopped. Has had tetanus shot in last ten years.

## 2021-10-19 ENCOUNTER — ANCILLARY PROCEDURE (OUTPATIENT)
Dept: MAMMOGRAPHY | Facility: CLINIC | Age: 31
End: 2021-10-19
Attending: OBSTETRICS & GYNECOLOGY
Payer: COMMERCIAL

## 2021-10-19 ENCOUNTER — ANCILLARY PROCEDURE (OUTPATIENT)
Dept: ULTRASOUND IMAGING | Facility: CLINIC | Age: 31
End: 2021-10-19
Attending: OBSTETRICS & GYNECOLOGY
Payer: COMMERCIAL

## 2021-10-19 DIAGNOSIS — N64.4 BREAST PAIN: ICD-10-CM

## 2021-10-19 DIAGNOSIS — R92.8 ABNORMAL MAMMOGRAM: Primary | ICD-10-CM

## 2021-10-19 PROCEDURE — G0279 TOMOSYNTHESIS, MAMMO: HCPCS | Performed by: RADIOLOGY

## 2021-10-19 PROCEDURE — 77066 DX MAMMO INCL CAD BI: CPT | Performed by: RADIOLOGY

## 2021-10-19 PROCEDURE — 76642 ULTRASOUND BREAST LIMITED: CPT | Mod: RT | Performed by: RADIOLOGY

## 2021-11-24 NOTE — PROGRESS NOTES
"  Assessment & Plan     SOB (shortness of breath)  labd and imaging done today  No signs of disease/disorder  Awaiting results  Will contact pt once these are back  - CBC with platelets and differential; Future  - Comprehensive metabolic panel (BMP + Alb, Alk Phos, ALT, AST, Total. Bili, TP); Future  - Vitamin D Deficiency; Future  - Vitamin B12; Future  - Ferritin; Future  - Iron and iron binding capacity; Future  - TSH with free T4 reflex; Future  - XR Chest 2 Views; Future  - EKG 12-lead complete w/read - Clinics  - CBC with platelets and differential  - Comprehensive metabolic panel (BMP + Alb, Alk Phos, ALT, AST, Total. Bili, TP)  - Vitamin D Deficiency  - Vitamin B12  - Ferritin  - Iron and iron binding capacity  - TSH with free T4 reflex      25 minutes spent on the date of the encounter doing chart review, history and exam, documentation and further activities per the note        BMI:   Estimated body mass index is 30.66 kg/m  as calculated from the following:    Height as of this encounter: 1.753 m (5' 9\").    Weight as of this encounter: 94.2 kg (207 lb 9.6 oz).       See Patient Instructions    No follow-ups on file.    Eliz Gordon MD  [facility]   Jenae is a 31 year old who presents for the following health issues     History of Present Illness       She eats 2-3 servings of fruits and vegetables daily.She consumes 1 sweetened beverage(s) daily.She exercises with enough effort to increase her heart rate 9 or less minutes per day.  She exercises with enough effort to increase her heart rate 3 or less days per week. She is missing 1 dose(s) of medications per week.       Fatigue    recently delivered March  Not breastfeeding  Has regular periods  Has period today  use condoms for BC    Has had anemia in the past has not had labs  No recent covic - had covid over a year ago  Breathlessness for the past 2 weeks with going up stairs  No leg swelling    Some " "nightsweats no weight loss that is not intentional - working on losing baby weight      Onset: 1 month(s) ago   How long have you felt fatigued: 1 month(s) ago                                                     Description:  Description of activities and lifestyle: Healthy (Food) bu doesn't exercise   Schedule and responsibilities: Student at [employer]  How much sleep are you gettin hours at a time  Daily exercise: no regular exercise program  Are there episodes of normal energy levels: yes    Accompanying Signs & Symptoms:  Falling asleep during the day: no  Snoring: no  Do you stop breathing while sleeping: no                 Night sweats: no  Chest Pain: no  History of Alcohol/drug abuse:no  History of Depression: no  Any new anxiety/stressors:no  Abdominal pain: no   Change in appetite: YES                 Weight gain/loss: YES weight loss  Dark or bloody stools: no    Therapies tried and outcome: none with no relief          Review of Systems   Constitutional, HEENT, cardiovascular, pulmonary, gi and gu systems are negative, except as otherwise noted.      Objective    /74   Pulse 87   Temp 98.1  F (36.7  C)   Ht 1.753 m (5' 9\")   Wt 94.2 kg (207 lb 9.6 oz)   SpO2 97%   BMI 30.66 kg/m    Body mass index is 30.66 kg/m .  Physical Exam   GENERAL: healthy, alert and no distress  RESP: lungs clear to auscultation - no rales, rhonchi or wheezes  CV: regular rate and rhythm, normal S1 S2, no S3 or S4, no murmur, click or rub, no peripheral edema and peripheral pulses strong    CXR - Reviewed and interpreted by me Normal- no infiltrates, effusions, pneumothoraces, cardiomegaly or masses    EKG: NSR          "

## 2021-11-29 ENCOUNTER — OFFICE VISIT (OUTPATIENT)
Dept: FAMILY MEDICINE | Facility: CLINIC | Age: 31
End: 2021-11-29
Payer: COMMERCIAL

## 2021-11-29 ENCOUNTER — ANCILLARY PROCEDURE (OUTPATIENT)
Dept: GENERAL RADIOLOGY | Facility: CLINIC | Age: 31
End: 2021-11-29
Attending: FAMILY MEDICINE
Payer: COMMERCIAL

## 2021-11-29 VITALS
HEIGHT: 69 IN | OXYGEN SATURATION: 97 % | TEMPERATURE: 98.1 F | SYSTOLIC BLOOD PRESSURE: 113 MMHG | DIASTOLIC BLOOD PRESSURE: 74 MMHG | HEART RATE: 87 BPM | BODY MASS INDEX: 30.75 KG/M2 | WEIGHT: 207.6 LBS

## 2021-11-29 DIAGNOSIS — R06.02 SOB (SHORTNESS OF BREATH): Primary | ICD-10-CM

## 2021-11-29 DIAGNOSIS — R06.02 SOB (SHORTNESS OF BREATH): ICD-10-CM

## 2021-11-29 PROCEDURE — 83550 IRON BINDING TEST: CPT | Performed by: FAMILY MEDICINE

## 2021-11-29 PROCEDURE — 82607 VITAMIN B-12: CPT | Performed by: FAMILY MEDICINE

## 2021-11-29 PROCEDURE — 99214 OFFICE O/P EST MOD 30 MIN: CPT | Performed by: FAMILY MEDICINE

## 2021-11-29 PROCEDURE — 82728 ASSAY OF FERRITIN: CPT | Performed by: FAMILY MEDICINE

## 2021-11-29 PROCEDURE — 93000 ELECTROCARDIOGRAM COMPLETE: CPT | Performed by: FAMILY MEDICINE

## 2021-11-29 PROCEDURE — 36415 COLL VENOUS BLD VENIPUNCTURE: CPT | Performed by: FAMILY MEDICINE

## 2021-11-29 PROCEDURE — 71046 X-RAY EXAM CHEST 2 VIEWS: CPT | Mod: FY | Performed by: RADIOLOGY

## 2021-11-29 PROCEDURE — 80050 GENERAL HEALTH PANEL: CPT | Performed by: FAMILY MEDICINE

## 2021-11-29 PROCEDURE — 82306 VITAMIN D 25 HYDROXY: CPT | Performed by: FAMILY MEDICINE

## 2021-11-29 ASSESSMENT — MIFFLIN-ST. JEOR: SCORE: 1711.05

## 2021-11-30 LAB
BASOPHILS # BLD AUTO: 0 10E3/UL (ref 0–0.2)
BASOPHILS NFR BLD AUTO: 0 %
DEPRECATED CALCIDIOL+CALCIFEROL SERPL-MC: 16 UG/L (ref 20–75)
EOSINOPHIL # BLD AUTO: 0.3 10E3/UL (ref 0–0.7)
EOSINOPHIL NFR BLD AUTO: 6 %
ERYTHROCYTE [DISTWIDTH] IN BLOOD BY AUTOMATED COUNT: 13.7 % (ref 10–15)
HCT VFR BLD AUTO: 37.3 % (ref 35–47)
HGB BLD-MCNC: 11.7 G/DL (ref 11.7–15.7)
LYMPHOCYTES # BLD AUTO: 2.9 10E3/UL (ref 0.8–5.3)
LYMPHOCYTES NFR BLD AUTO: 58 %
MCH RBC QN AUTO: 29.1 PG (ref 26.5–33)
MCHC RBC AUTO-ENTMCNC: 31.4 G/DL (ref 31.5–36.5)
MCV RBC AUTO: 93 FL (ref 78–100)
MONOCYTES # BLD AUTO: 0.4 10E3/UL (ref 0–1.3)
MONOCYTES NFR BLD AUTO: 8 %
NEUTROPHILS # BLD AUTO: 1.4 10E3/UL (ref 1.6–8.3)
NEUTROPHILS NFR BLD AUTO: 28 %
PLATELET # BLD AUTO: 338 10E3/UL (ref 150–450)
RBC # BLD AUTO: 4.02 10E6/UL (ref 3.8–5.2)
VIT B12 SERPL-MCNC: 635 PG/ML (ref 193–986)
WBC # BLD AUTO: 5 10E3/UL (ref 4–11)

## 2021-12-01 LAB
ALBUMIN SERPL-MCNC: 3.7 G/DL (ref 3.4–5)
ALP SERPL-CCNC: 65 U/L (ref 40–150)
ALT SERPL W P-5'-P-CCNC: 21 U/L (ref 0–50)
ANION GAP SERPL CALCULATED.3IONS-SCNC: 7 MMOL/L (ref 3–14)
AST SERPL W P-5'-P-CCNC: 16 U/L (ref 0–45)
BILIRUB SERPL-MCNC: 0.2 MG/DL (ref 0.2–1.3)
BUN SERPL-MCNC: 6 MG/DL (ref 7–30)
CALCIUM SERPL-MCNC: 9.6 MG/DL (ref 8.5–10.1)
CHLORIDE BLD-SCNC: 109 MMOL/L (ref 94–109)
CO2 SERPL-SCNC: 23 MMOL/L (ref 20–32)
CREAT SERPL-MCNC: 0.51 MG/DL (ref 0.52–1.04)
FERRITIN SERPL-MCNC: 6 NG/ML (ref 12–150)
GFR SERPL CREATININE-BSD FRML MDRD: >90 ML/MIN/1.73M2
GLUCOSE BLD-MCNC: 87 MG/DL (ref 70–99)
IRON SATN MFR SERPL: 8 % (ref 15–46)
IRON SERPL-MCNC: 24 UG/DL (ref 35–180)
POTASSIUM BLD-SCNC: 4.3 MMOL/L (ref 3.4–5.3)
PROT SERPL-MCNC: 7.7 G/DL (ref 6.8–8.8)
SODIUM SERPL-SCNC: 139 MMOL/L (ref 133–144)
TIBC SERPL-MCNC: 319 UG/DL (ref 240–430)
TSH SERPL DL<=0.005 MIU/L-ACNC: 1.26 MU/L (ref 0.4–4)

## 2021-12-03 NOTE — RESULT ENCOUNTER NOTE
Hello,    The labs showed that the ferritin was low and this indicates low iron stores.  See if you can increase iron rich foods in your diet (spinach, dried fruits, some red meat)  The vitamin D level was quite low.  I would take 6000 international unit(s) vitamin D3 daily all winter long as a result.    The CBC showed that your white count is slightly low.    We can recheck all in 2 months    Please let me know if the shortness of breath is no better.  If this is true we should see you again in clinic. If you have any new symptoms. Like weight loss, fevers or night sweats then we should see you in clinic much sooner        Eliz Gordon MD

## 2022-04-05 ENCOUNTER — TELEPHONE (OUTPATIENT)
Dept: OBGYN | Facility: CLINIC | Age: 32
End: 2022-04-05
Payer: COMMERCIAL

## 2022-04-05 NOTE — TELEPHONE ENCOUNTER
Jenae,    To have those records sent you have to contact our medical records department at [phone] and they will send them for you.    Thank you

## 2022-04-29 ENCOUNTER — TRANSFERRED RECORDS (OUTPATIENT)
Dept: HEALTH INFORMATION MANAGEMENT | Facility: CLINIC | Age: 32
End: 2022-04-29
Payer: COMMERCIAL

## 2022-05-16 ENCOUNTER — HEALTH MAINTENANCE LETTER (OUTPATIENT)
Age: 32
End: 2022-05-16

## 2022-08-13 ENCOUNTER — OFFICE VISIT (OUTPATIENT)
Dept: FAMILY MEDICINE | Facility: CLINIC | Age: 32
End: 2022-08-13
Payer: COMMERCIAL

## 2022-08-13 VITALS
TEMPERATURE: 98.7 F | OXYGEN SATURATION: 99 % | DIASTOLIC BLOOD PRESSURE: 77 MMHG | HEART RATE: 74 BPM | SYSTOLIC BLOOD PRESSURE: 114 MMHG | RESPIRATION RATE: 16 BRPM

## 2022-08-13 DIAGNOSIS — J02.0 STREP PHARYNGITIS: Primary | ICD-10-CM

## 2022-08-13 LAB — DEPRECATED S PYO AG THROAT QL EIA: POSITIVE

## 2022-08-13 PROCEDURE — 87880 STREP A ASSAY W/OPTIC: CPT | Performed by: FAMILY MEDICINE

## 2022-08-13 PROCEDURE — 99213 OFFICE O/P EST LOW 20 MIN: CPT | Performed by: FAMILY MEDICINE

## 2022-08-13 RX ORDER — AMOXICILLIN 875 MG
875 TABLET ORAL 2 TIMES DAILY
Qty: 20 TABLET | Refills: 0 | Status: SHIPPED | OUTPATIENT
Start: 2022-08-13 | End: 2022-08-23

## 2022-08-13 NOTE — PROGRESS NOTES
"  Assessment & Plan     Strep pharyngitis  Patient with sore throat for the past week that has surprised her that is not resolved and she is not feeling well.  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    Weight as of 11/29/21: 94.2 kg (207 lb 9.6 oz).           No follow-ups on file.    Ganga Pelayo MD  [facility]    Radha Emanuel is a 31 year old, presenting for the following health issues:  Urgent Care and Throat Pain (Per pt states when she swallows she has discomfort and productive cough, right side swollen lymph nodes and roof of her mouth hurts really tender for the past couple of days not getting better. )      Patient is here with a sore throat.  It feels different than what she is used to it is harder to swallow and she has had discomfort up in the top of her throat.  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  She done COVID testing that is negative x2.  She has not had any rashes.  She denies nausea but says she can tell she just does not feel very well             Review of Systems         Objective    /77   Pulse 74   Temp 98.7  F (37.1  C) (Tympanic)   Resp 16   LMP 08/10/2022   SpO2 99%   There is no height or weight on file to calculate BMI.  Physical Exam  Vitals and nursing note reviewed.   Constitutional:       Appearance: Normal appearance.   HENT:      Head: Normocephalic.      Nose: Nose normal.      Mouth/Throat:      Comments: Right posterior tonsils red and slightly swollen left is clear  Skin:     General: Skin is warm. "   Neurological:      Mental Status: She is alert.   Psychiatric:         Mood and Affect: Mood normal.         Behavior: Behavior normal.         Thought Content: Thought content normal.         Judgment: Judgment normal.            Results for orders placed or performed in visit on 08/13/22 (from the past 24 hour(s))   Streptococcus A Rapid Screen w/Reflex to PCR - Clinic Collect    Specimen: Throat; Swab   Result Value Ref Range    Group A Strep antigen Positive (A) Negative                   .  ..

## 2022-08-15 ENCOUNTER — OFFICE VISIT (OUTPATIENT)
Dept: FAMILY MEDICINE | Facility: CLINIC | Age: 32
End: 2022-08-15
Payer: COMMERCIAL

## 2022-08-15 VITALS
HEIGHT: 68 IN | HEART RATE: 89 BPM | TEMPERATURE: 98.8 F | OXYGEN SATURATION: 98 % | WEIGHT: 209 LBS | SYSTOLIC BLOOD PRESSURE: 108 MMHG | DIASTOLIC BLOOD PRESSURE: 76 MMHG | BODY MASS INDEX: 31.67 KG/M2

## 2022-08-15 DIAGNOSIS — Z00.01 ENCOUNTER FOR ROUTINE ADULT PHYSICAL EXAM WITH ABNORMAL FINDINGS: ICD-10-CM

## 2022-08-15 DIAGNOSIS — K62.5 RECTAL BLEEDING: ICD-10-CM

## 2022-08-15 DIAGNOSIS — Z12.4 CERVICAL CANCER SCREENING: ICD-10-CM

## 2022-08-15 DIAGNOSIS — Z83.3 FAMILY HISTORY OF DIABETES MELLITUS: ICD-10-CM

## 2022-08-15 PROBLEM — Z23 NEED FOR TDAP VACCINATION: Status: RESOLVED | Noted: 2021-02-02 | Resolved: 2022-08-15

## 2022-08-15 LAB
CHOLEST SERPL-MCNC: 175 MG/DL
ERYTHROCYTE [DISTWIDTH] IN BLOOD BY AUTOMATED COUNT: 14 % (ref 10–15)
FASTING STATUS PATIENT QL REPORTED: YES
FASTING STATUS PATIENT QL REPORTED: YES
GLUCOSE BLD-MCNC: 87 MG/DL (ref 70–99)
HBA1C MFR BLD: 5.6 % (ref 0–5.6)
HCT VFR BLD AUTO: 32 % (ref 35–47)
HDLC SERPL-MCNC: 57 MG/DL
HGB BLD-MCNC: 10 G/DL (ref 11.7–15.7)
LDLC SERPL CALC-MCNC: 104 MG/DL
MCH RBC QN AUTO: 27.8 PG (ref 26.5–33)
MCHC RBC AUTO-ENTMCNC: 31.3 G/DL (ref 31.5–36.5)
MCV RBC AUTO: 89 FL (ref 78–100)
NONHDLC SERPL-MCNC: 118 MG/DL
PLATELET # BLD AUTO: 380 10E3/UL (ref 150–450)
RBC # BLD AUTO: 3.6 10E6/UL (ref 3.8–5.2)
TRIGL SERPL-MCNC: 71 MG/DL
TSH SERPL DL<=0.005 MIU/L-ACNC: 0.89 MU/L (ref 0.4–4)
WBC # BLD AUTO: 6.1 10E3/UL (ref 4–11)

## 2022-08-15 PROCEDURE — 87624 HPV HI-RISK TYP POOLED RSLT: CPT | Performed by: FAMILY MEDICINE

## 2022-08-15 PROCEDURE — 83036 HEMOGLOBIN GLYCOSYLATED A1C: CPT | Performed by: FAMILY MEDICINE

## 2022-08-15 PROCEDURE — 82947 ASSAY GLUCOSE BLOOD QUANT: CPT | Performed by: FAMILY MEDICINE

## 2022-08-15 PROCEDURE — G0145 SCR C/V CYTO,THINLAYER,RESCR: HCPCS | Performed by: FAMILY MEDICINE

## 2022-08-15 PROCEDURE — 85027 COMPLETE CBC AUTOMATED: CPT | Performed by: FAMILY MEDICINE

## 2022-08-15 PROCEDURE — 99395 PREV VISIT EST AGE 18-39: CPT | Performed by: FAMILY MEDICINE

## 2022-08-15 PROCEDURE — 99213 OFFICE O/P EST LOW 20 MIN: CPT | Mod: 25 | Performed by: FAMILY MEDICINE

## 2022-08-15 PROCEDURE — 36415 COLL VENOUS BLD VENIPUNCTURE: CPT | Performed by: FAMILY MEDICINE

## 2022-08-15 PROCEDURE — 84443 ASSAY THYROID STIM HORMONE: CPT | Performed by: FAMILY MEDICINE

## 2022-08-15 PROCEDURE — 80061 LIPID PANEL: CPT | Performed by: FAMILY MEDICINE

## 2022-08-15 ASSESSMENT — ENCOUNTER SYMPTOMS
ABDOMINAL PAIN: 0
HEMATURIA: 0
CHILLS: 0
HEMATOCHEZIA: 0

## 2022-08-15 ASSESSMENT — PAIN SCALES - GENERAL: PAINLEVEL: NO PAIN (0)

## 2022-08-15 NOTE — PROGRESS NOTES
SUBJECTIVE:   CC: Jenae Renee is an 31 year old woman who presents for preventive health visit.       Patient has been advised of split billing requirements and indicates understanding: Yes  Healthy Habits:     Getting at least 3 servings of Calcium per day:  Yes    Bi-annual eye exam:  Yes    Dental care twice a year:  Yes    Sleep apnea or symptoms of sleep apnea:  None    Diet:  Regular (no restrictions)    Frequency of exercise:  1 day/week    Duration of exercise:  15-30 minutes    Taking medications regularly:  Yes    Medication side effects:  Not applicable    PHQ-2 Total Score: 0    Additional concerns today:  No    Pt has had Problems with Rectal Bleeding off and on  Mild pain  Has not seen hemorrhoids  Has constipation at Times and then Diarrhea-this is not new  wants Lipid and a1c done  Has gained weigh and has Family history Diabetes    Today's PHQ-2 Score:   PHQ-2 ( 1999 Pfizer) 8/15/2022   Q1: Little interest or pleasure in doing things 0   Q2: Feeling down, depressed or hopeless 0   PHQ-2 Score 0   PHQ-2 Total Score (12-17 Years)- Positive if 3 or more points; Administer PHQ-A if positive -   Q1: Little interest or pleasure in doing things Not at all   Q2: Feeling down, depressed or hopeless Not at all   PHQ-2 Score 0       Abuse: Current or Past (Physical, Sexual or Emotional) - No  Do you feel safe in your environment? Yes    Social History     Tobacco Use     Smoking status: Never Smoker     Smokeless tobacco: Never Used   Substance Use Topics     Alcohol use: No         Alcohol Use 8/15/2022   Prescreen: >3 drinks/day or >7 drinks/week? No       Reviewed orders with patient.  Reviewed health maintenance and updated orders accordingly - Yes  Lab work is in process  Labs reviewed in EPIC  BP Readings from Last 3 Encounters:   08/15/22 108/76   08/13/22 114/77   11/29/21 113/74    Wt Readings from Last 3 Encounters:   08/15/22 94.8 kg (209 lb)   11/29/21 94.2 kg (207 lb 9.6 oz)   10/14/21 93 kg  (205 lb)                  Patient Active Problem List   Diagnosis     Encounter for supervision of normal pregnancy in multigravida - [facility] MD pt     Subclinical hyperthyroidism     Anemia during pregnancy in third trimester     Tinea cruris     Clinical diagnosis of COVID-19     LGA at 36 weeks 90th %ile and AC >97th %ile     Encounter for triage in pregnant patient     Labor and delivery indication for care or intervention     No past surgical history on file.    Social History     Tobacco Use     Smoking status: Never Smoker     Smokeless tobacco: Never Used   Substance Use Topics     Alcohol use: No     Family History   Problem Relation Age of Onset     Diabetes Father      Hypertension Father      Cerebrovascular Disease Father      Diabetes Maternal Grandmother      Hypertension Maternal Grandmother      Diabetes Maternal Grandfather      Obesity Paternal Grandfather      Obesity Brother      Obesity Sister          Current Outpatient Medications   Medication Sig Dispense Refill     amoxicillin (AMOXIL) 875 MG tablet Take 1 tablet (875 mg) by mouth 2 times daily for 10 days (Patient not taking: Reported on 8/15/2022) 20 tablet 0     Allergies   Allergen Reactions     Nsaids Other (See Comments)     States she had a duodenal ulcer and her MD told her never to take NSAIDS.       Breast Cancer Screening:    FHS-7:   Breast CA Risk Assessment (FHS-7) 10/19/2021   Did any of your first-degree relatives have breast or ovarian cancer? No   Did any of your relatives have bilateral breast cancer? No   Did any man in your family have breast cancer? No   Did any woman in your family have breast and ovarian cancer? No   Did any woman in your family have breast cancer before age 50 y? No   Do you have 2 or more relatives with breast and/or ovarian cancer? No   Do you have 2 or more relatives with breast and/or bowel cancer? No       pt says she is on q 6 months mammogram-she has no Lumps  Pertinent mammograms are reviewed  "under the imaging tab.    History of abnormal Pap smear: NO - age 30-65 PAP every 5 years with negative HPV co-testing recommended     Reviewed and updated as needed this visit by clinical staff   Tobacco  Allergies  Meds                Reviewed and updated as needed this visit by Provider                   Past Medical History:   Diagnosis Date     Ulcer, gastric, acute     Duodenal ulcer.      No past surgical history on file.    Review of Systems   Constitutional: Negative for chills.   HENT: Negative for congestion.    Cardiovascular: Negative for chest pain.   Gastrointestinal: Negative for abdominal pain and hematochezia.   Genitourinary: Negative for hematuria.     CONSTITUTIONAL: NEGATIVE for fever, chills, change in weight  INTEGUMENTARU/SKIN: NEGATIVE for worrisome rashes, moles or lesions  EYES: NEGATIVE for vision changes or irritation  ENT: NEGATIVE for ear, mouth and throat problems  RESP: NEGATIVE for significant cough or SOB  BREAST: NEGATIVE for masses, tenderness or discharge  CV: NEGATIVE for chest pain, palpitations or peripheral edema  GI: NEGATIVE for nausea, abdominal pain, heartburn, or change in bowel habits   female: as above  MUSCULOSKELETAL: NEGATIVE for significant arthralgias or myalgia  NEURO: NEGATIVE for weakness, dizziness or paresthesias  PSYCHIATRIC: NEGATIVE for changes in mood or affect     OBJECTIVE:   /76   Pulse 89   Temp 98.8  F (37.1  C) (Temporal)   Ht 1.738 m (5' 8.43\")   Wt 94.8 kg (209 lb)   LMP 08/10/2022   SpO2 98%   BMI 31.38 kg/m    Physical Exam  GENERAL: healthy, alert and no distress  EYES: Eyes grossly normal to inspection, PERRL and conjunctivae and sclerae normal  HENT: ear canals and TM's normal, nose and mouth without ulcers or lesions  NECK: no adenopathy, no asymmetry, masses, or scars and thyroid normal to palpation  RESP: lungs clear to auscultation - no rales, rhonchi or wheezes  BREAST: normal without masses, tenderness or nipple " discharge and no palpable axillary masses or adenopathy  CV: regular rate and rhythm, normal S1 S2, no S3 or S4, no murmur, click or rub, no peripheral edema and peripheral pulses strong  ABDOMEN: soft, nontender, no hepatosplenomegaly, no masses and bowel sounds normal   (female): normal female external genitalia, normal urethral meatus, vaginal mucosa pink, moist, well rugated, and normal cervix/adnexa/uterus without masses or discharge  RECTAL: no hemorrhoids  MS: no gross musculoskeletal defects noted, no edema  SKIN: no suspicious lesions or rashes  NEURO: Normal strength and tone, mentation intact and speech normal  PSYCH: mentation appears normal, affect normal/bright    Diagnostic Test Results:  Labs reviewed in Epic  Pending     ASSESSMENT/PLAN:   (Z00.01) Encounter for routine adult physical exam with abnormal findings  Comment:   Plan: Lipid panel reflex to direct LDL Non-fasting,         Glucose, TSH with free T4 reflex            (K62.5) Rectal bleeding  Comment: HGB pending   Pt has had a Ongoing issue with Rectal Bleeding  Plan: Adult GI  Referral - Procedure Only,         CBC with platelets        Discussed can see a specialist  Pt wants a colonoscopy as she occasionally gets abdominal pain also  Advised increase Fiber in diet  5 servings Fruits and vegetables daily    (Z12.4) Cervical cancer screening  Comment: pap done  Plan: Pap Screen with HPV - recommended age 30 - 65         years            (Z83.3) Family history of diabetes mellitus  Comment: pending   Plan: Hemoglobin A1c          Pt has appointment for a IUD with her GYN  Pt will schedule her 6 month follow up mammogram    Patient has been advised of split billing requirements and indicates understanding: Yes    COUNSELING:  Reviewed preventive health counseling, as reflected in patient instructions       Regular exercise       Healthy diet/nutrition       Contraception       Folic Acid    Estimated body mass index is 31.38 kg/m   "as calculated from the following:    Height as of this encounter: 1.738 m (5' 8.43\").    Weight as of this encounter: 94.8 kg (209 lb).    Weight management plan: low radha diet    She reports that she has never smoked. She has never used smokeless tobacco.      Counseling Resources:  ATP IV Guidelines  Pooled Cohorts Equation Calculator  Breast Cancer Risk Calculator  BRCA-Related Cancer Risk Assessment: FHS-7 Tool  FRAX Risk Assessment  ICSI Preventive Guidelines  Dietary Guidelines for Americans, 2010  USDA's MyPlate  ASA Prophylaxis  Lung CA Screening    Bertha Pelayo MD  [facility]  "

## 2022-08-18 LAB
BKR LAB AP GYN ADEQUACY: NORMAL
BKR LAB AP GYN INTERPRETATION: NORMAL
BKR LAB AP HPV REFLEX: NORMAL
BKR LAB AP PREVIOUS ABNORMAL: NORMAL
PATH REPORT.COMMENTS IMP SPEC: NORMAL
PATH REPORT.COMMENTS IMP SPEC: NORMAL
PATH REPORT.RELEVANT HX SPEC: NORMAL

## 2022-08-22 LAB
HUMAN PAPILLOMA VIRUS 16 DNA: NEGATIVE
HUMAN PAPILLOMA VIRUS 18 DNA: NEGATIVE
HUMAN PAPILLOMA VIRUS FINAL DIAGNOSIS: NORMAL
HUMAN PAPILLOMA VIRUS OTHER HR: NEGATIVE

## 2022-08-26 ENCOUNTER — OFFICE VISIT (OUTPATIENT)
Dept: FAMILY MEDICINE | Facility: CLINIC | Age: 32
End: 2022-08-26
Payer: COMMERCIAL

## 2022-08-26 ENCOUNTER — MYC MEDICAL ADVICE (OUTPATIENT)
Dept: FAMILY MEDICINE | Facility: CLINIC | Age: 32
End: 2022-08-26

## 2022-08-26 VITALS
DIASTOLIC BLOOD PRESSURE: 70 MMHG | RESPIRATION RATE: 16 BRPM | HEART RATE: 93 BPM | SYSTOLIC BLOOD PRESSURE: 102 MMHG | WEIGHT: 204 LBS | TEMPERATURE: 98.4 F | OXYGEN SATURATION: 99 % | BODY MASS INDEX: 30.63 KG/M2

## 2022-08-26 DIAGNOSIS — D50.9 IRON DEFICIENCY ANEMIA, UNSPECIFIED IRON DEFICIENCY ANEMIA TYPE: Primary | ICD-10-CM

## 2022-08-26 DIAGNOSIS — J02.9 SORE THROAT: Primary | ICD-10-CM

## 2022-08-26 LAB
DEPRECATED S PYO AG THROAT QL EIA: POSITIVE
GROUP A STREP BY PCR: NOT DETECTED

## 2022-08-26 PROCEDURE — 87880 STREP A ASSAY W/OPTIC: CPT | Performed by: NURSE PRACTITIONER

## 2022-08-26 PROCEDURE — 99213 OFFICE O/P EST LOW 20 MIN: CPT | Performed by: NURSE PRACTITIONER

## 2022-08-26 PROCEDURE — 87651 STREP A DNA AMP PROBE: CPT | Performed by: NURSE PRACTITIONER

## 2022-08-26 ASSESSMENT — ENCOUNTER SYMPTOMS
FEVER: 0
ARTHRALGIAS: 1
COUGH: 0
SORE THROAT: 1
CHILLS: 0

## 2022-08-26 NOTE — PROGRESS NOTES
Assessment & Plan     Sore throat    - Streptococcus A Rapid Screen w/Reflex to PCR - Clinic Collect  - Symptomatic; Unknown COVID-19 Virus (Coronavirus) by PCR Nose  - Group A Streptococcus PCR Throat Swab       Focused exam and history done due to COVID-19 pandemic in a walk-in setting.      Rapid strep done by medical assistant was positive again today.  Patient informed we need to wait for the strep culture to determine if this is a false positive or that she needs additional antibiotic for strep.  Otherwise could certainly be a viral etiology as well.    Called lab to ensure that they did do the strep culture despite having a positive rapid strep.    No red flags.     Isolate pending covid results.      Recheck if shortness of breath or new fevers develop.  Rest.     OTCs recommended: None [   ].  Dextromethorphan  [  ], guaifenesin [  ], pseudoephedrine [   ], Afrin for no greater than 3 days [  ], Tylenol or ibuprofen [x  ],  Cepacol.                No follow-ups on file.    Johanne Peguero, [facility] [place]    Rahda Emanuel is a 31 year old female who presents to clinic today for the following health issues:  Chief Complaint   Patient presents with     Pharyngitis     Patient tested Strep positive 2 weeks ago. Symptoms return x 2 days and joint pain      HPI    Sore throat with body aches 2 days ago.  There is severe pain with swallowing.  Feels similar to when she had strep 2 weeks ago.  No cough or congestion.    Tested positive for strep on August 13.  Treated with amoxicillin.  This was associated with cough as well as well as negative COVID test.  Lifting heavy items at work just before.      Is a resident at the hospital.  Negative home COVID test.    Having joint pain in her hands since this started as well.        Review of Systems   Constitutional: Negative for chills and fever.   HENT: Positive for sore throat. Negative for congestion and ear pain.    Respiratory:  Negative for cough.    Musculoskeletal: Positive for arthralgias.           Objective    /70 (BP Location: Right arm, Patient Position: Sitting, Cuff Size: Adult Large)   Pulse 93   Temp 98.4  F (36.9  C) (Oral)   Resp 16   Wt 92.5 kg (204 lb)   LMP 08/10/2022   SpO2 99%   BMI 30.63 kg/m    Physical Exam  Constitutional:       General: She is not in acute distress.     Appearance: She is well-developed.   HENT:      Mouth/Throat:      Pharynx: Uvula midline. Posterior oropharyngeal erythema present. No uvula swelling.      Tonsils: No tonsillar exudate or tonsillar abscesses.   Eyes:      General:         Right eye: No discharge.         Left eye: No discharge.      Conjunctiva/sclera: Conjunctivae normal.   Pulmonary:      Effort: Pulmonary effort is normal.   Musculoskeletal:         General: Normal range of motion.   Lymphadenopathy:      Cervical: No cervical adenopathy.   Skin:     General: Skin is warm and dry.      Capillary Refill: Capillary refill takes less than 2 seconds.   Neurological:      Mental Status: She is alert and oriented to person, place, and time.   Psychiatric:         Mood and Affect: Mood normal.         Behavior: Behavior normal.         Thought Content: Thought content normal.         Judgment: Judgment normal.            Results for orders placed or performed in visit on 08/26/22 (from the past 24 hour(s))   Streptococcus A Rapid Screen w/Reflex to PCR - Clinic Collect    Specimen: Throat; Swab   Result Value Ref Range    Group A Strep antigen Positive (A) Negative

## 2022-08-26 NOTE — PATIENT INSTRUCTIONS
We will wait for your strep culture to come back before we retreat you for strep.  Watch your MyChart for this information.    Rapid strep not accurate if you have been treated in the last 21 days -could be falsely positive.    Rest at home until you know your COVID result and so you are feeling better.    Tylenol 1000 mg 3 times daily, sore throat lozenges such as Cepacol.

## 2022-08-29 NOTE — TELEPHONE ENCOUNTER
Routing to provider to advise. Pt has no medications listed on med list.    Rachell Escobedo RN  [facility]

## 2022-09-11 ENCOUNTER — HEALTH MAINTENANCE LETTER (OUTPATIENT)
Age: 32
End: 2022-09-11

## 2022-10-14 ENCOUNTER — OFFICE VISIT (OUTPATIENT)
Dept: FAMILY MEDICINE | Facility: CLINIC | Age: 32
End: 2022-10-14
Payer: COMMERCIAL

## 2022-10-14 VITALS
RESPIRATION RATE: 16 BRPM | HEIGHT: 68 IN | WEIGHT: 204 LBS | OXYGEN SATURATION: 99 % | BODY MASS INDEX: 30.92 KG/M2 | HEART RATE: 86 BPM | TEMPERATURE: 98 F | DIASTOLIC BLOOD PRESSURE: 64 MMHG | SYSTOLIC BLOOD PRESSURE: 106 MMHG

## 2022-10-14 DIAGNOSIS — N92.0 MENORRHAGIA WITH REGULAR CYCLE: Primary | ICD-10-CM

## 2022-10-14 DIAGNOSIS — D50.9 IRON DEFICIENCY ANEMIA, UNSPECIFIED IRON DEFICIENCY ANEMIA TYPE: ICD-10-CM

## 2022-10-14 PROBLEM — Z34.80 ENCOUNTER FOR SUPERVISION OF NORMAL PREGNANCY IN MULTIGRAVIDA: Status: RESOLVED | Noted: 2020-08-27 | Resolved: 2022-10-14

## 2022-10-14 PROBLEM — U07.1 CLINICAL DIAGNOSIS OF COVID-19: Status: RESOLVED | Noted: 2021-02-22 | Resolved: 2022-10-14

## 2022-10-14 PROBLEM — O99.013 ANEMIA DURING PREGNANCY IN THIRD TRIMESTER: Status: RESOLVED | Noted: 2020-11-19 | Resolved: 2022-10-14

## 2022-10-14 PROBLEM — Z36.89 ENCOUNTER FOR TRIAGE IN PREGNANT PATIENT: Status: RESOLVED | Noted: 2021-03-17 | Resolved: 2022-10-14

## 2022-10-14 PROBLEM — O36.63X0 EXCESSIVE FETAL GROWTH AFFECTING MANAGEMENT OF PREGNANCY IN THIRD TRIMESTER: Status: RESOLVED | Noted: 2021-03-03 | Resolved: 2022-10-14

## 2022-10-14 LAB
BASOPHILS # BLD AUTO: 0 10E3/UL (ref 0–0.2)
BASOPHILS NFR BLD AUTO: 1 %
EOSINOPHIL # BLD AUTO: 0.2 10E3/UL (ref 0–0.7)
EOSINOPHIL NFR BLD AUTO: 4 %
ERYTHROCYTE [DISTWIDTH] IN BLOOD BY AUTOMATED COUNT: 14.9 % (ref 10–15)
FERRITIN SERPL-MCNC: 11 NG/ML (ref 6–175)
HCT VFR BLD AUTO: 37.2 % (ref 35–47)
HGB BLD-MCNC: 11.9 G/DL (ref 11.7–15.7)
IMM GRANULOCYTES # BLD: 0 10E3/UL
IMM GRANULOCYTES NFR BLD: 0 %
IRON BINDING CAPACITY (ROCHE): 335 UG/DL (ref 240–430)
IRON SATN MFR SERPL: 17 % (ref 15–46)
IRON SERPL-MCNC: 57 UG/DL (ref 37–145)
LYMPHOCYTES # BLD AUTO: 2.6 10E3/UL (ref 0.8–5.3)
LYMPHOCYTES NFR BLD AUTO: 52 %
MCH RBC QN AUTO: 27.9 PG (ref 26.5–33)
MCHC RBC AUTO-ENTMCNC: 32 G/DL (ref 31.5–36.5)
MCV RBC AUTO: 87 FL (ref 78–100)
MONOCYTES # BLD AUTO: 0.4 10E3/UL (ref 0–1.3)
MONOCYTES NFR BLD AUTO: 9 %
NEUTROPHILS # BLD AUTO: 1.7 10E3/UL (ref 1.6–8.3)
NEUTROPHILS NFR BLD AUTO: 34 %
NRBC # BLD AUTO: 0 10E3/UL
NRBC BLD AUTO-RTO: 0 /100
PLATELET # BLD AUTO: 350 10E3/UL (ref 150–450)
RBC # BLD AUTO: 4.27 10E6/UL (ref 3.8–5.2)
RETICS # AUTO: 0.04 10E6/UL (ref 0.03–0.1)
RETICS/RBC NFR AUTO: 0.9 % (ref 0.5–2)
WBC # BLD AUTO: 5 10E3/UL (ref 4–11)

## 2022-10-14 PROCEDURE — 85060 BLOOD SMEAR INTERPRETATION: CPT | Performed by: PATHOLOGY

## 2022-10-14 PROCEDURE — 83550 IRON BINDING TEST: CPT | Performed by: NURSE PRACTITIONER

## 2022-10-14 PROCEDURE — 99214 OFFICE O/P EST MOD 30 MIN: CPT | Performed by: NURSE PRACTITIONER

## 2022-10-14 PROCEDURE — 83540 ASSAY OF IRON: CPT | Performed by: NURSE PRACTITIONER

## 2022-10-14 PROCEDURE — 85045 AUTOMATED RETICULOCYTE COUNT: CPT | Performed by: NURSE PRACTITIONER

## 2022-10-14 PROCEDURE — 82728 ASSAY OF FERRITIN: CPT | Performed by: NURSE PRACTITIONER

## 2022-10-14 PROCEDURE — 36415 COLL VENOUS BLD VENIPUNCTURE: CPT | Performed by: NURSE PRACTITIONER

## 2022-10-14 PROCEDURE — 85025 COMPLETE CBC W/AUTO DIFF WBC: CPT | Performed by: NURSE PRACTITIONER

## 2022-10-14 NOTE — PROGRESS NOTES
Assessment & Plan     Menorrhagia with regular cycle  Recommend US to rule out underlying pathology.  Discussed IUD - will mychart if wanting referral.    - Iron and iron binding capacity  - Ferritin  - Lab Blood Morphology Pathologist Review  - US Pelvic Complete with Transvaginal    Iron deficiency anemia, unspecified iron deficiency anemia type  Recheck CBC and Iron studies.  May need Iron infusions as she can not tolerate oral iron  Await labs - pending.    - Iron and iron binding capacity  - Ferritin  - Lab Blood Morphology Pathologist Review  - US Pelvic Complete with Transvaginal            See Patient Instructions    Return in about 4 weeks (around 11/11/2022) for Recheck symptoms.    Paty Degroot NP  [facility] DARRYN Emanuel is a 32 year old, presenting for the following health issues:  Fatigue (/)      HPI     Concern - Fatigue   Onset: On-going for about 2 weeks  Description: Fatigue and headaches, thinks it's related to her iron deficiency anemia-would like iron checked today  Intensity: mild  Progression of Symptoms:  worsening  Accompanying Signs & Symptoms: None  Previous history of similar problem: Yes-has iron deficiency anemia  Precipitating factors:        Worsened by: None  Alleviating factors:        Improved by: None  Therapies tried and outcome: Taking iron tablets on and off-upsets stomach    Reports increase in fatigue/lightheadedness over the last 2-3 weeks.    History of heavy periods for the last periods.  Changing pad/tampon every 1 hour.  This has not been normal - worse over the last several months.  No increase in pain with periods.  Lasting 7 days.  LMP 9/25.    History of CATHY - taking Iron supplements - not tolerating due to GI upset. Taking only 2 x per week on average.    FH - negative anemia, or other.  History of thyroid disorder.    TSH   Date Value Ref Range Status   08/15/2022 0.89 0.40 - 4.00 mU/L Final   08/27/2020 0.19 (L) 0.40 -  "4.00 mU/L Final     Hemoglobin   Date Value Ref Range Status   08/15/2022 10.0 (L) 11.7 - 15.7 g/dL Final   04/01/2021 10.3 (L) 11.7 - 15.7 g/dL Final   ]       Review of Systems   Constitutional, HEENT, cardiovascular, pulmonary, GI, , musculoskeletal, neuro, skin, endocrine and psych systems are negative, except as otherwise noted.      Objective    /64   Pulse 86   Temp 98  F (36.7  C) (Tympanic)   Resp 16   Ht 1.738 m (5' 8.43\")   Wt 92.5 kg (204 lb)   SpO2 99%   BMI 30.63 kg/m    Body mass index is 30.63 kg/m .  Physical Exam   GENERAL: healthy, alert and no distress  RESP: lungs clear to auscultation - no rales, rhonchi or wheezes  CV: regular rate and rhythm, normal S1 S2, no S3 or S4, no murmur, click or rub, no peripheral edema and peripheral pulses strong  PSYCH: mentation appears normal, affect normal/bright                 "

## 2022-10-17 LAB
PATH REPORT.COMMENTS IMP SPEC: NORMAL
PATH REPORT.FINAL DX SPEC: NORMAL
PATH REPORT.MICROSCOPIC SPEC OTHER STN: NORMAL
PATH REPORT.MICROSCOPIC SPEC OTHER STN: NORMAL

## 2022-12-02 ENCOUNTER — VIRTUAL VISIT (OUTPATIENT)
Dept: FAMILY MEDICINE | Facility: CLINIC | Age: 32
End: 2022-12-02
Payer: COMMERCIAL

## 2022-12-02 DIAGNOSIS — B35.6 TINEA CRURIS: ICD-10-CM

## 2022-12-02 DIAGNOSIS — R21 RASH: Primary | ICD-10-CM

## 2022-12-02 PROCEDURE — 99213 OFFICE O/P EST LOW 20 MIN: CPT | Performed by: INTERNAL MEDICINE

## 2022-12-02 RX ORDER — BENZOCAINE/MENTHOL 6 MG-10 MG
LOZENGE MUCOUS MEMBRANE 2 TIMES DAILY
Qty: 30 G | Refills: 0 | Status: SHIPPED | OUTPATIENT
Start: 2022-12-02

## 2022-12-02 RX ORDER — PRENATAL VIT 91/IRON/FOLIC/DHA 28-975-200
COMBINATION PACKAGE (EA) ORAL 2 TIMES DAILY
Qty: 42 G | Refills: 0 | Status: SHIPPED | OUTPATIENT
Start: 2022-12-02 | End: 2023-11-20

## 2022-12-02 RX ORDER — FLUCONAZOLE 150 MG/1
150 TABLET ORAL ONCE
Qty: 1 TABLET | Refills: 0 | Status: SHIPPED | OUTPATIENT
Start: 2022-12-02 | End: 2022-12-02

## 2022-12-02 NOTE — PROGRESS NOTES
Jenae is a 32 year old who is being evaluated via a billable telephone visit.      What phone number would you like to be contacted at? [phone]  How would you like to obtain your AVS? MyChart    Assessment & Plan   Problem List Items Addressed This Visit        Musculoskeletal and Integumentary    Tinea cruris    Relevant Medications    terbinafine (LAMISIL) 1 % external cream    hydrocortisone (CORTAID) 1 % external cream   Other Visit Diagnoses     Rash    -  Primary    Relevant Medications    terbinafine (LAMISIL) 1 % external cream    hydrocortisone (CORTAID) 1 % external cream         Patient requesting treatment for her rash in her thigh area, describes more as itchy red patches suspected for fungal dermatosis.Reviewed; not classic herpes presentations as non vesicular reported by pt (scrapings were sent from lesion on 4/2021 were neg for HSV 1&2)  She is requesting oral antifungal therapy advised I would not recommend oral and continue topical treatment, she has no known diabetic or on immunosuppressive therapy or state.  She has tried over-the-counter Lotrimin she will try terbinafine topical with 1% hydrocortisone mixed together for the next 10 days.  She requested 1 dose of Diflucan advised usually this will be inadequate treatment, will order 1 dose per her request although likely will not help, she had a chemistry lipid panel back a year ago.  Advised if any worsening symptoms will refer to dermatology . Continue with adequate and dry hygiene.  Patient refused to send us pictures of the rash as this involved area in her inner thigh area.  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  Last seen on 4/2021 by Gyn for same med problem and Topical antifungal Rx was given       See Patient Instructions    No follow-ups on file.    Miek Varela MD  [facility]    Subjective   Jenae is a 32 year old, presenting for the following health issues:  Derm Problem  (Rash)      History of Present Illness       Reason for visit:  Skin rash groin    She eats 2-3 servings of fruits and vegetables daily.She consumes 1 sweetened beverage(s) daily.She exercises with enough effort to increase her heart rate 10 to 19 minutes per day.  She exercises with enough effort to increase her heart rate 4 days per week.   She is taking medications regularly.     FP RESIDNECY, tried 2 weeks of of antifungal  Inner thigh  Scaly hyperpigmented, circular, little of satelite, using antifungal, both inner thighs,   No cellulitis  No is itchy, and sweaty  Successful treatment with oral antifungal,     Not immunocompromised          Review of Systems   Constitutional, HEENT, cardiovascular, pulmonary, gi and gu systems are negative, except as otherwise noted.      Objective           Vitals:  No vitals were obtained today due to virtual visit.    Physical Exam   healthy, alert and no distress  PSYCH: Alert and oriented times 3; coherent speech, normal   rate and volume, able to articulate logical thoughts, able   to abstract reason, no tangential thoughts, no hallucinations   or delusions  Her affect is normal  RESP: No cough, no audible wheezing, able to talk in full sentences  Remainder of exam unable to be completed due to telephone visits    Office Visit on 10/14/2022   Component Date Value Ref Range Status     Iron 10/14/2022 57  37 - 145 ug/dL Final     Iron Sat Index 10/14/2022 17  15 - 46 % Final     Iron Binding Capacity 10/14/2022 335  240 - 430 ug/dL Final     Ferritin 10/14/2022 11  6 - 175 ng/mL Final     Final Diagnosis 10/14/2022    Final                    Value:This result contains rich text formatting which cannot be displayed here.     Peripheral Smear 10/14/2022    Final                    Value:This result contains rich text formatting which cannot be displayed here.     Peripheral Hematologic Data 10/14/2022    Final                    Value:This result contains rich text  formatting which cannot be displayed here.     Performing Labs 10/14/2022    Final                    Value:This result contains rich text formatting which cannot be displayed here.     WBC Count 10/14/2022 5.0  4.0 - 11.0 10e3/uL Final     RBC Count 10/14/2022 4.27  3.80 - 5.20 10e6/uL Final     Hemoglobin 10/14/2022 11.9  11.7 - 15.7 g/dL Final     Hematocrit 10/14/2022 37.2  35.0 - 47.0 % Final     MCV 10/14/2022 87  78 - 100 fL Final     MCH 10/14/2022 27.9  26.5 - 33.0 pg Final     MCHC 10/14/2022 32.0  31.5 - 36.5 g/dL Final     RDW 10/14/2022 14.9  10.0 - 15.0 % Final     Platelet Count 10/14/2022 350  150 - 450 10e3/uL Final     % Neutrophils 10/14/2022 34  % Final     % Lymphocytes 10/14/2022 52  % Final     % Monocytes 10/14/2022 9  % Final     % Eosinophils 10/14/2022 4  % Final     % Basophils 10/14/2022 1  % Final     % Immature Granulocytes 10/14/2022 0  % Final     NRBCs per 100 WBC 10/14/2022 0  <1 /100 Final     Absolute Neutrophils 10/14/2022 1.7  1.6 - 8.3 10e3/uL Final     Absolute Lymphocytes 10/14/2022 2.6  0.8 - 5.3 10e3/uL Final     Absolute Monocytes 10/14/2022 0.4  0.0 - 1.3 10e3/uL Final     Absolute Eosinophils 10/14/2022 0.2  0.0 - 0.7 10e3/uL Final     Absolute Basophils 10/14/2022 0.0  0.0 - 0.2 10e3/uL Final     Absolute Immature Granulocytes 10/14/2022 0.0  <=0.4 10e3/uL Final     Absolute NRBCs 10/14/2022 0.0  10e3/uL Final     % Reticulocyte 10/14/2022 0.9  0.5 - 2.0 % Final     Absolute Reticulocyte 10/14/2022 0.038  0.025 - 0.095 10e6/uL Final               Phone call duration: 16 minutes

## 2022-12-02 NOTE — PROGRESS NOTES
"Jenae is a 32 year old who is being evaluated via a billable video visit.      How would you like to obtain your AVS? {AVS Preference:716042}  If the video visit is dropped, the invitation should be resent by: {video visit invitation (Optional) :037787}  Will anyone else be joining your video visit? {:942467}  {If patient encounters technical issues they should call [phone] :677113}        {PROVIDER CHARTING PREFERENCE:629558}    Subjective   Jenae is a 32 year old{ACCOMPANIED BY STATEMENT (Optional):111916}, presenting for the following health issues:  Derm Problem (Rash)      History of Present Illness       Reason for visit:  Skin rash groin    She eats 2-3 servings of fruits and vegetables daily.She consumes 1 sweetened beverage(s) daily.She exercises with enough effort to increase her heart rate 10 to 19 minutes per day.  She exercises with enough effort to increase her heart rate 4 days per week.   She is taking medications regularly.       {SUPERLIST (Optional):750206}  {additonal problems for provider to add (Optional):130189}    Review of Systems   {ROS COMP (Optional):729981}      Objective           Vitals:  No vitals were obtained today due to virtual visit.    Physical Exam   {video visit exam brief selected:500673::\"GENERAL: Healthy, alert and no distress\",\"EYES: Eyes grossly normal to inspection.  No discharge or erythema, or obvious scleral/conjunctival abnormalities.\",\"RESP: No audible wheeze, cough, or visible cyanosis.  No visible retractions or increased work of breathing.  \",\"SKIN: Visible skin clear. No significant rash, abnormal pigmentation or lesions.\",\"NEURO: Cranial nerves grossly intact.  Mentation and speech appropriate for age.\",\"PSYCH: Mentation appears normal, affect normal/bright, judgement and insight intact, normal speech and appearance well-groomed.\"}    {Diagnostic Test Results (Optional):185086}    {AMBULATORY ATTESTATION (Optional):248788}        Video-Visit " "Details    Video Start Time: {video visit start/end time for provider to select:889462}    Type of service:  Video Visit    Video End Time:{video visit start/end time for provider to select:637924}    Originating Location (pt. Location): {video visit patient location:215956::\"Home\"}    {PROVIDER LOCATION On-site should be selected for visits conducted from your clinic location or adjoining [facility] hospital, academic office, or other nearby [facility] building. Off-site should be selected for all other provider locations, including home:366956}    Distant Location (provider location):  {virtual location provider:807793}    Platform used for Video Visit: {Virtual Visit Platforms:320173::\"[employer]\"}    "

## 2022-12-22 RX ORDER — FLUCONAZOLE 150 MG/1
TABLET ORAL
COMMUNITY
Start: 2022-12-02 | End: 2023-11-20

## 2022-12-26 ENCOUNTER — OFFICE VISIT (OUTPATIENT)
Dept: MIDWIFE SERVICES | Facility: CLINIC | Age: 32
End: 2022-12-26
Payer: COMMERCIAL

## 2022-12-26 VITALS
HEART RATE: 78 BPM | SYSTOLIC BLOOD PRESSURE: 107 MMHG | WEIGHT: 207.9 LBS | BODY MASS INDEX: 31.22 KG/M2 | DIASTOLIC BLOOD PRESSURE: 78 MMHG | OXYGEN SATURATION: 100 %

## 2022-12-26 DIAGNOSIS — Z30.430 ENCOUNTER FOR IUD INSERTION: Primary | ICD-10-CM

## 2022-12-26 LAB — HCG UR QL: NEGATIVE

## 2022-12-26 PROCEDURE — 58300 INSERT INTRAUTERINE DEVICE: CPT | Performed by: ADVANCED PRACTICE MIDWIFE

## 2022-12-26 PROCEDURE — 87591 N.GONORRHOEAE DNA AMP PROB: CPT | Performed by: ADVANCED PRACTICE MIDWIFE

## 2022-12-26 PROCEDURE — 87491 CHLMYD TRACH DNA AMP PROBE: CPT | Performed by: ADVANCED PRACTICE MIDWIFE

## 2022-12-26 PROCEDURE — 81025 URINE PREGNANCY TEST: CPT | Performed by: ADVANCED PRACTICE MIDWIFE

## 2022-12-26 PROCEDURE — 99212 OFFICE O/P EST SF 10 MIN: CPT | Mod: 25 | Performed by: ADVANCED PRACTICE MIDWIFE

## 2022-12-26 NOTE — PROGRESS NOTES
IUD Insertion:  CONSULT:    Is a pregnancy test required: Yes.  Was it positive or negative?  Negative  Was a consent obtained?  Yes    Subjective: Jenae Renee is a 32 year old  presents for IUD and desires Aicha type IUD. She has had an IUD in the past. Tried Aicha in postpartum period, only kept it for 2 months as she felt that she was getting depressed. Now wondering if it was just her life circumstances, and would like to try again. She is a first year resident, so desires very effective and low maintenance birth control.    Patient has been given the opportunity to ask questions about all forms of birth control, including all options appropriate for Jenae Renee. Discussed that no method of birth control, except abstinence is 100% effective against pregnancy or sexually transmitted infection.     Jenae Renee understands she may have the IUD removed at any time. IUD should be removed by a health care provider.    The entire insertion procedure was reviewed with the patient, including care after placement.    Patient's last menstrual period was 2022 (exact date).  No allergy to betadine or shellfish. Patient desires STD screening  HCG Qual Urine   Date Value Ref Range Status   2020 Positive neg Final     hCG Urine Qualitative   Date Value Ref Range Status   2022 Negative Negative Final     Comment:     This test is for screening purposes.  Results should be interpreted along with the clinical picture.  Confirmation testing is available if warranted by ordering [id number], HCG Quantitative Pregnancy.         /78   Pulse 78   Wt 94.3 kg (207 lb 14.4 oz)   LMP 2022 (Exact Date)   SpO2 100%   BMI 31.22 kg/m      Pelvic Exam:   EG/BUS: normal genital architecture without lesions, erythema or abnormal secretions.   Vagina: moist, pink, rugae with physiologic discharge and secretions  Cervix: multiparous no lesions and pink, moist, closed, without lesion or CMT  Uterus:  antevertedposition, mobile, no pain  Adnexa: within normal limits and no masses, nodularity, tenderness    PROCEDURE NOTE: -- IUD Insertion    Reason for Insertion: contraception    Under sterile technique, cervix was visualized with speculum and prepped with Betadine solution swab x 3. Tenaculum was placed for stability. The uterus was gently straightened and sounded to 8.5 cm. IUD prepared for placement, and IUD inserted according to 's instructions without difficulty or significant resitance, and deployed at the fundus. The strings were visualized and trimmed to 2.0 cm from the external os. Tenaculum was removed and hemostasis noted. Speculum removed.  Patient tolerated procedure well.    Lot # [id number]  Exp: 01/2024    EBL: minimal    Complications: none    ASSESSMENT:     ICD-10-CM    1. Encounter for IUD insertion  Z30.430 HCG Qual, Urine ([id number])     levonorgestrel (NIKKIE) 13.5 MG IUD     levonorgestrel (NIKKIE) 13.5 MG IUD 13.5 mg     INSERTION INTRAUTERINE DEVICE     NEISSERIA GONORRHOEA PCR     CHLAMYDIA TRACHOMATIS PCR           PLAN:    Given 's handouts, including when to have IUD removed, list of danger s/sx, side effects and follow up recommended. Encouraged condom use for prevention of STD. Back up contraception advised for 7 days if progestin method. Advised to call for any fever, for prolonged or severe pain or bleeding, abnormal vaginal discharge, or unable to palpate strings. She was advised to use pain medications (ibuprofen) as needed for mild to moderate pain. Advised to follow-up in clinic in 4-6 weeks for IUD string check if unable to find strings or as directed by provider.     Stone Davis CNM

## 2022-12-27 LAB
C TRACH DNA SPEC QL NAA+PROBE: NEGATIVE
N GONORRHOEA DNA SPEC QL NAA+PROBE: NEGATIVE

## 2023-01-13 ENCOUNTER — TELEPHONE (OUTPATIENT)
Dept: MIDWIFE SERVICES | Facility: CLINIC | Age: 33
End: 2023-01-13

## 2023-01-13 ENCOUNTER — TELEPHONE (OUTPATIENT)
Dept: OBGYN | Facility: CLINIC | Age: 33
End: 2023-01-13

## 2023-01-13 DIAGNOSIS — N64.59 BREAST SYMPTOM: Primary | ICD-10-CM

## 2023-01-13 NOTE — TELEPHONE ENCOUNTER
Pt is calling asking if she can get an order for a Mammogram w/ US. Please mychart pt when order is places.

## 2023-01-13 NOTE — TELEPHONE ENCOUNTER
4/29/22 Mammogram note suggested repeat US in 6 months.    Not sure the appropriate order for a MA with US.    Coco Alston RN

## 2023-01-13 NOTE — TELEPHONE ENCOUNTER
Patient called with questions about mammogram.  Ordered by provider on 7th floor.  Patient transferred to 7th floor.

## 2023-01-13 NOTE — TELEPHONE ENCOUNTER
Order has been placed. She can call any [facility] to schedule.  Stone Davis CNM

## 2023-01-13 NOTE — PROGRESS NOTES
Order placed from right breast mammogram and U/S based on recommendation from outside radiology clinic. See result in Epic chart from 04/2022.  Stone Davis CNM

## 2023-01-18 ENCOUNTER — TELEPHONE (OUTPATIENT)
Dept: OBGYN | Facility: CLINIC | Age: 33
End: 2023-01-18
Payer: COMMERCIAL

## 2023-01-18 NOTE — TELEPHONE ENCOUNTER
Reason for call:  Order   Order or referral being requested: Mammogram  Reason for request: Scheduling - needs order.   Date needed: as soon as possible  Has the patient been seen by the PCP for this problem? YES    Additional comments: Pt would like order to be able to schedule next mammogram. Pt gets imaging every 6 months.     Phone number to reach patient:  Home number on file [phone] (home)    Best Time:  N/A    Can we leave a detailed message on this number?  YES    Travel screening: Not Applicable

## 2023-01-18 NOTE — TELEPHONE ENCOUNTER
Last mammogram 4/29/22 - recommended next MA in 6 months.    Referral place 1/13/23 for next MA    LVM that the order is in already.    Coco Alston RN

## 2023-01-18 NOTE — TELEPHONE ENCOUNTER
Reason for call:  Form   Our goal is to have forms completed within 72 hours, however some forms may require a visit or additional information.     Who is the form from? [facility]   Where did the form come from? form was faxed in  What clinic location was the form placed at? [facility]  Where was the form placed? Fax inbox Box/Folder  What number is listed as a contact on the form? [phone]    Phone call message - patient request for a letter, form or note:     Date needed: at your convenience  Please fax to [phone]  Has the patient signed a consent form for release of information? Not Applicable    Additional comments: [facility] requesting follow up regarding special view mammogram that was recommended for pt. Patient was called for further information regarding form and stated she was told that was not needed and to continue with every 6 month mammogram. Patient tried scheduling 6 month mammogram through [facility], but was unable due to needing doctor order.     Type of letter, form or note: Imaging follow up    Phone number to reach patient:  Home number on file [phone] (home)    Best Time:  N/A    Can we leave a detailed message on this number?  Not Applicable    Travel screening: Not Applicable

## 2023-05-15 ENCOUNTER — ANCILLARY PROCEDURE (OUTPATIENT)
Dept: MAMMOGRAPHY | Facility: CLINIC | Age: 33
End: 2023-05-15
Attending: ADVANCED PRACTICE MIDWIFE
Payer: COMMERCIAL

## 2023-05-15 DIAGNOSIS — N64.59 BREAST SYMPTOM: ICD-10-CM

## 2023-05-15 PROCEDURE — 76642 ULTRASOUND BREAST LIMITED: CPT | Mod: RT

## 2023-05-15 PROCEDURE — 77066 DX MAMMO INCL CAD BI: CPT

## 2023-07-16 ENCOUNTER — APPOINTMENT (OUTPATIENT)
Dept: ULTRASOUND IMAGING | Facility: CLINIC | Age: 33
End: 2023-07-16
Attending: EMERGENCY MEDICINE
Payer: COMMERCIAL

## 2023-07-16 ENCOUNTER — APPOINTMENT (OUTPATIENT)
Dept: GENERAL RADIOLOGY | Facility: CLINIC | Age: 33
End: 2023-07-16
Attending: EMERGENCY MEDICINE
Payer: COMMERCIAL

## 2023-07-16 ENCOUNTER — HOSPITAL ENCOUNTER (EMERGENCY)
Facility: CLINIC | Age: 33
Discharge: HOME | End: 2023-07-17
Attending: EMERGENCY MEDICINE | Admitting: EMERGENCY MEDICINE
Payer: COMMERCIAL

## 2023-07-16 DIAGNOSIS — R10.13 EPIGASTRIC PAIN: ICD-10-CM

## 2023-07-16 LAB
ALBUMIN SERPL BCG-MCNC: 4.4 G/DL (ref 3.5–5.2)
ALBUMIN UR-MCNC: NEGATIVE MG/DL
ALP SERPL-CCNC: 65 U/L (ref 35–104)
ALT SERPL W P-5'-P-CCNC: 13 U/L (ref 0–50)
ANION GAP SERPL CALCULATED.3IONS-SCNC: 10 MMOL/L (ref 7–15)
APPEARANCE UR: CLEAR
AST SERPL W P-5'-P-CCNC: 18 U/L (ref 0–45)
BASOPHILS # BLD AUTO: 0 10E3/UL (ref 0–0.2)
BASOPHILS NFR BLD AUTO: 1 %
BILIRUB SERPL-MCNC: 0.2 MG/DL
BILIRUB UR QL STRIP: NEGATIVE
BUN SERPL-MCNC: 7.4 MG/DL (ref 6–20)
CALCIUM SERPL-MCNC: 9.6 MG/DL (ref 8.6–10)
CHLORIDE SERPL-SCNC: 103 MMOL/L (ref 98–107)
COLOR UR AUTO: NORMAL
CREAT SERPL-MCNC: 0.58 MG/DL (ref 0.51–0.95)
D DIMER PPP FEU-MCNC: <0.27 UG/ML FEU (ref 0–0.5)
DEPRECATED HCO3 PLAS-SCNC: 24 MMOL/L (ref 22–29)
EOSINOPHIL # BLD AUTO: 0.2 10E3/UL (ref 0–0.7)
EOSINOPHIL NFR BLD AUTO: 3 %
ERYTHROCYTE [DISTWIDTH] IN BLOOD BY AUTOMATED COUNT: 13.1 % (ref 10–15)
GFR SERPL CREATININE-BSD FRML MDRD: >90 ML/MIN/1.73M2
GLUCOSE SERPL-MCNC: 84 MG/DL (ref 70–99)
GLUCOSE UR STRIP-MCNC: NEGATIVE MG/DL
HCG UR QL: NEGATIVE
HCT VFR BLD AUTO: 38.3 % (ref 35–47)
HGB BLD-MCNC: 12.5 G/DL (ref 11.7–15.7)
HGB UR QL STRIP: NEGATIVE
HOLD SPECIMEN: NORMAL
HOLD SPECIMEN: NORMAL
IMM GRANULOCYTES # BLD: 0 10E3/UL
IMM GRANULOCYTES NFR BLD: 0 %
KETONES UR STRIP-MCNC: NEGATIVE MG/DL
LEUKOCYTE ESTERASE UR QL STRIP: NEGATIVE
LIPASE SERPL-CCNC: 27 U/L (ref 13–60)
LYMPHOCYTES # BLD AUTO: 4 10E3/UL (ref 0.8–5.3)
LYMPHOCYTES NFR BLD AUTO: 55 %
MCH RBC QN AUTO: 30 PG (ref 26.5–33)
MCHC RBC AUTO-ENTMCNC: 32.6 G/DL (ref 31.5–36.5)
MCV RBC AUTO: 92 FL (ref 78–100)
MONOCYTES # BLD AUTO: 0.5 10E3/UL (ref 0–1.3)
MONOCYTES NFR BLD AUTO: 7 %
NEUTROPHILS # BLD AUTO: 2.5 10E3/UL (ref 1.6–8.3)
NEUTROPHILS NFR BLD AUTO: 34 %
NITRATE UR QL: NEGATIVE
NRBC # BLD AUTO: 0 10E3/UL
NRBC BLD AUTO-RTO: 0 /100
PH UR STRIP: 7 [PH] (ref 5–7)
PLAT MORPH BLD: ABNORMAL
PLATELET # BLD AUTO: NORMAL 10*3/UL
POTASSIUM SERPL-SCNC: 3.6 MMOL/L (ref 3.4–5.3)
PROT SERPL-MCNC: 7.5 G/DL (ref 6.4–8.3)
RBC # BLD AUTO: 4.17 10E6/UL (ref 3.8–5.2)
RBC MORPH BLD: ABNORMAL
RBC URINE: <1 /HPF
SODIUM SERPL-SCNC: 137 MMOL/L (ref 136–145)
SP GR UR STRIP: 1.01 (ref 1–1.03)
SQUAMOUS EPITHELIAL: 1 /HPF
TROPONIN T SERPL HS-MCNC: <6 NG/L
UROBILINOGEN UR STRIP-MCNC: NORMAL MG/DL
WBC # BLD AUTO: 7.2 10E3/UL (ref 4–11)
WBC URINE: 1 /HPF

## 2023-07-16 PROCEDURE — 81001 URINALYSIS AUTO W/SCOPE: CPT | Performed by: EMERGENCY MEDICINE

## 2023-07-16 PROCEDURE — 93005 ELECTROCARDIOGRAM TRACING: CPT | Performed by: EMERGENCY MEDICINE

## 2023-07-16 PROCEDURE — 85048 AUTOMATED LEUKOCYTE COUNT: CPT | Performed by: EMERGENCY MEDICINE

## 2023-07-16 PROCEDURE — 258N000003 HC RX IP 258 OP 636: Performed by: EMERGENCY MEDICINE

## 2023-07-16 PROCEDURE — 81025 URINE PREGNANCY TEST: CPT | Performed by: EMERGENCY MEDICINE

## 2023-07-16 PROCEDURE — 99284 EMERGENCY DEPT VISIT MOD MDM: CPT | Mod: 25 | Performed by: EMERGENCY MEDICINE

## 2023-07-16 PROCEDURE — 83690 ASSAY OF LIPASE: CPT | Performed by: EMERGENCY MEDICINE

## 2023-07-16 PROCEDURE — 71046 X-RAY EXAM CHEST 2 VIEWS: CPT

## 2023-07-16 PROCEDURE — 93010 ELECTROCARDIOGRAM REPORT: CPT | Performed by: EMERGENCY MEDICINE

## 2023-07-16 PROCEDURE — 250N000011 HC RX IP 250 OP 636: Mod: JZ | Performed by: EMERGENCY MEDICINE

## 2023-07-16 PROCEDURE — 84484 ASSAY OF TROPONIN QUANT: CPT | Performed by: EMERGENCY MEDICINE

## 2023-07-16 PROCEDURE — 76705 ECHO EXAM OF ABDOMEN: CPT

## 2023-07-16 PROCEDURE — 99285 EMERGENCY DEPT VISIT HI MDM: CPT | Mod: 25 | Performed by: EMERGENCY MEDICINE

## 2023-07-16 PROCEDURE — 85014 HEMATOCRIT: CPT | Performed by: EMERGENCY MEDICINE

## 2023-07-16 PROCEDURE — 85379 FIBRIN DEGRADATION QUANT: CPT | Performed by: EMERGENCY MEDICINE

## 2023-07-16 PROCEDURE — 36415 COLL VENOUS BLD VENIPUNCTURE: CPT | Performed by: EMERGENCY MEDICINE

## 2023-07-16 PROCEDURE — 80053 COMPREHEN METABOLIC PANEL: CPT | Performed by: EMERGENCY MEDICINE

## 2023-07-16 PROCEDURE — 96365 THER/PROPH/DIAG IV INF INIT: CPT | Performed by: EMERGENCY MEDICINE

## 2023-07-16 PROCEDURE — 250N000013 HC RX MED GY IP 250 OP 250 PS 637: Performed by: EMERGENCY MEDICINE

## 2023-07-16 RX ORDER — MAGNESIUM HYDROXIDE/ALUMINUM HYDROXICE/SIMETHICONE 120; 1200; 1200 MG/30ML; MG/30ML; MG/30ML
15 SUSPENSION ORAL ONCE
Status: COMPLETED | OUTPATIENT
Start: 2023-07-16 | End: 2023-07-16

## 2023-07-16 RX ORDER — ACETAMINOPHEN 500 MG
1000 TABLET ORAL ONCE
Status: COMPLETED | OUTPATIENT
Start: 2023-07-16 | End: 2023-07-16

## 2023-07-16 RX ADMIN — SODIUM CHLORIDE 1000 ML: 9 INJECTION, SOLUTION INTRAVENOUS at 21:42

## 2023-07-16 RX ADMIN — FAMOTIDINE 20 MG: 20 INJECTION, SOLUTION INTRAVENOUS at 23:40

## 2023-07-16 RX ADMIN — ALUMINUM HYDROXIDE, MAGNESIUM HYDROXIDE, AND SIMETHICONE 15 ML: 200; 200; 20 SUSPENSION ORAL at 23:40

## 2023-07-16 RX ADMIN — ACETAMINOPHEN 1000 MG: 500 TABLET ORAL at 23:40

## 2023-07-16 ASSESSMENT — ACTIVITIES OF DAILY LIVING (ADL)
ADLS_ACUITY_SCORE: 35
ADLS_ACUITY_SCORE: 33

## 2023-07-17 ENCOUNTER — PATIENT OUTREACH (OUTPATIENT)
Dept: CARE COORDINATION | Facility: CLINIC | Age: 33
End: 2023-07-17
Payer: COMMERCIAL

## 2023-07-17 VITALS
OXYGEN SATURATION: 98 % | RESPIRATION RATE: 16 BRPM | HEART RATE: 90 BPM | SYSTOLIC BLOOD PRESSURE: 95 MMHG | DIASTOLIC BLOOD PRESSURE: 55 MMHG | TEMPERATURE: 99.4 F | WEIGHT: 205 LBS | BODY MASS INDEX: 30.78 KG/M2

## 2023-07-17 LAB
ATRIAL RATE - MUSE: 76 BPM
DIASTOLIC BLOOD PRESSURE - MUSE: NORMAL MMHG
HOLD SPECIMEN: NORMAL
INTERPRETATION ECG - MUSE: NORMAL
P AXIS - MUSE: 71 DEGREES
PR INTERVAL - MUSE: 170 MS
QRS DURATION - MUSE: 84 MS
QT - MUSE: 390 MS
QTC - MUSE: 438 MS
R AXIS - MUSE: 50 DEGREES
SYSTOLIC BLOOD PRESSURE - MUSE: NORMAL MMHG
T AXIS - MUSE: 49 DEGREES
VENTRICULAR RATE- MUSE: 76 BPM

## 2023-07-17 NOTE — ED TRIAGE NOTES
Triage Assessment     Row Name 07/16/23 2013       Triage Assessment (Adult)    Airway WDL WDL       Respiratory WDL    Respiratory WDL WDL       Skin Circulation/Temperature WDL    Skin Circulation/Temperature WDL WDL       Cardiac WDL    Cardiac WDL WDL       Peripheral/Neurovascular WDL    Peripheral Neurovascular WDL WDL       Cognitive/Neuro/Behavioral WDL    Cognitive/Neuro/Behavioral WDL WDL

## 2023-07-17 NOTE — ED PROVIDER NOTES
[facility] EMERGENCY DEPARTMENT ([facility])    7/16/23      ED PROVIDER NOTE  ED 03      History     Chief Complaint   Patient presents with     Abdominal Pain     Right upper quadrant pain that started this afternoon after she ate, history of gastric ulcer, family history of gallbladder issues, nausea without vomiting     The history is provided by the patient and medical records. No  was used.     Jenae Renee is a 32 year old female with a past medical history significant for gastric ulcer and previous H. pylori infection who presents to the emergency department for evaluation of right upper quadrant pain.  Patient reports that on the eighth or ninth of July she ate an avocado sandwich and then had severe abdominal pain, chills, and 3 episodes of vomiting.  She then fell asleep and felt better the next day.  She thought it was because she ate avocado because she had a similar episode of this back in March after eating avocados.  For the past week she has had some pain in her sternum that would sometimes travel to her back.  She says it was less pain and more of the discomfort that she was feeling in her chest.  She began to experience RUQ pain today after eating lunch which she rated at a 6 out of 10.  Here at the ED, she rates her pain as a 2 out of 10 but after physical exam where pressure was exerted on her RUQ, she rates her pain as a 4 out of 10.  She says the pain increases in severity when taking a deep breath.  Patient patient denies any shortness of breath, fever, or rash.  She denies diarrhea but says that she normally follows a bowel movement every other day, but recently has been having 2-3 normal BMs each day.  She denies history of blood clots, pain in extremities, or recent long travel.  Patient uses a hormonal IUD.  Patient has no family history of cardiac issues.    Past Medical History  Past Medical History:   Diagnosis Date     Ulcer, gastric, acute     Duodenal  ulcer.     No past surgical history on file.  docusate sodium (COLACE) 50 MG capsule  ferrous sulfate (SLO-FE) 142 (45 Fe) MG CR tablet  fluconazole (DIFLUCAN) 150 MG tablet  hydrocortisone (CORTAID) 1 % external cream  levonorgestrel (NIKKIE) 13.5 MG IUD  terbinafine (LAMISIL) 1 % external cream      Allergies   Allergen Reactions     Nsaids Other (See Comments)     States she had a duodenal ulcer and her MD told her never to take NSAIDS.     Family History  Family History   Problem Relation Age of Onset     Diabetes Father      Hypertension Father      Cerebrovascular Disease Father      Diabetes Maternal Grandmother      Hypertension Maternal Grandmother      Diabetes Maternal Grandfather      Obesity Paternal Grandfather      Obesity Brother      Obesity Sister      Social History   Social History     Tobacco Use     Smoking status: Never     Smokeless tobacco: Never   Vaping Use     Vaping Use: Never used   Substance Use Topics     Alcohol use: No     Drug use: No      Past medical history, past surgical history, medications, allergies, family history, and social history were reviewed with the patient. No additional pertinent items.      A complete review of systems was performed with pertinent positives and negatives noted in the HPI, and all other systems negative.    Physical Exam   BP: 124/82  Pulse: 78  Temp: 99.4  F (37.4  C)  Resp: 16  Weight: 93 kg (205 lb)  SpO2: 98 %  Physical Exam  Vitals and nursing note reviewed.   Constitutional:       General: She is not in acute distress.     Appearance: Normal appearance. She is well-developed.   HENT:      Head: Normocephalic and atraumatic.   Eyes:      General: No scleral icterus.     Conjunctiva/sclera: Conjunctivae normal.   Pulmonary:      Effort: Pulmonary effort is normal. No respiratory distress.   Abdominal:      General: Abdomen is flat.      Tenderness: There is abdominal tenderness in the right upper quadrant. There is no guarding or rebound.  Negative signs include Langford's sign and McBurney's sign.   Musculoskeletal:      Cervical back: Normal range of motion and neck supple.   Skin:     General: Skin is warm and dry.      Findings: No rash.   Neurological:      General: No focal deficit present.      Mental Status: She is alert and oriented to person, place, and time.           9:12 PM  The patient was seen and examined by Jennifer Kam in Room 3.     ED Course, Procedures, & Data      Procedures       ED Course Selections:        EKG Interpretation:      Interpreted by Jennifer Kam MD  Time reviewed: per Epic  Symptoms at time of EKG: RUQ pain   Rhythm: normal sinus   Rate: normal  Axis: normal  Ectopy: none  Conduction: normal  ST Segments/ T Waves: No ST-T wave changes  Q Waves: none  Comparison to prior: No old EKG available    Clinical Impression: normal EKG                     No results found for any visits on 07/16/23.  Medications - No data to display  Labs Ordered and Resulted from Time of ED Arrival to Time of ED Departure - No data to display  No orders to display          Critical care was not performed.     Medical Decision Making  The patient's presentation was of moderate complexity (an acute illness with systemic symptoms).    The patient's evaluation involved:  ordering and/or review of 3+ test(s) in this encounter (see separate area of note for details)    The patient's management necessitated moderate risk (prescription drug management including medications given in the ED).      Assessment & Plan      32 year old female with a past medical history significant for gastric ulcer and previous H. pylori infection who presents to the emergency department for evaluation of right upper quadrant pain. Vital signs stable and afebrile including normal pulse ox at 98% on room air.  IV established, labs ransacked reviewed document epic remarkable for normal CBC and electrolytes, normal lipase, negative hCG, negative troponin negative  D-dimer.  EKG was obtained which revealed normal sinus rhythm without acute ischemic changes.  X-ray of the chest was obtained and interpreted independently by me which revealed no acute process.  Right upper quadrant ultrasound also obtained this was also read as normal.  Patient was also given Pepcid 20 mg IV, Tylenol 1 g p.o., Maalox 15 cc p.o.  She was given a liter normal saline fluid bolus.  Upon repeat assessment patient's symptoms are improved.  She was discharged home with a prescription for omeprazole plan follow-up with primary care provider within 2 to 3 weeks for further evaluation care.     I have reviewed the nursing notes. I have reviewed the findings, diagnosis, plan and need for follow up with the patient.    New Prescriptions    No medications on file       Final diagnoses:   Epigastric pain       IJordan, am serving as a trained medical scribe to document services personally performed by Jennifer Kam MD based on the provider's statements to me on July 16, 2023.  This document has been checked and approved by the attending provider.    I, Jennifer Kam MD, was physically present and have reviewed and verified the accuracy of this note documented by Jordan Booth medical scribe.      Jennifer Kam MD  [facility] EMERGENCY DEPARTMENT  July 16, 2023     Jennifer Kam MD  07/17/23 0138

## 2023-07-31 ENCOUNTER — PATIENT OUTREACH (OUTPATIENT)
Dept: CARE COORDINATION | Facility: CLINIC | Age: 33
End: 2023-07-31
Payer: COMMERCIAL

## 2023-09-29 ENCOUNTER — OFFICE VISIT (OUTPATIENT)
Dept: FAMILY MEDICINE | Facility: CLINIC | Age: 33
End: 2023-09-29
Payer: COMMERCIAL

## 2023-09-29 ENCOUNTER — TELEPHONE (OUTPATIENT)
Dept: FAMILY MEDICINE | Facility: CLINIC | Age: 33
End: 2023-09-29

## 2023-09-29 VITALS
WEIGHT: 200 LBS | TEMPERATURE: 97.9 F | BODY MASS INDEX: 29.62 KG/M2 | DIASTOLIC BLOOD PRESSURE: 70 MMHG | OXYGEN SATURATION: 99 % | RESPIRATION RATE: 20 BRPM | SYSTOLIC BLOOD PRESSURE: 108 MMHG | HEIGHT: 69 IN | HEART RATE: 98 BPM

## 2023-09-29 DIAGNOSIS — Z86.2 HISTORY OF ANEMIA: ICD-10-CM

## 2023-09-29 DIAGNOSIS — R00.2 PALPITATIONS: Primary | ICD-10-CM

## 2023-09-29 DIAGNOSIS — E05.90 SUBCLINICAL HYPERTHYROIDISM: ICD-10-CM

## 2023-09-29 LAB
ALBUMIN SERPL BCG-MCNC: 4.3 G/DL (ref 3.5–5.2)
ALP SERPL-CCNC: 56 U/L (ref 35–104)
ALT SERPL W P-5'-P-CCNC: 14 U/L (ref 0–50)
ANION GAP SERPL CALCULATED.3IONS-SCNC: 10 MMOL/L (ref 7–15)
AST SERPL W P-5'-P-CCNC: 26 U/L (ref 0–45)
BILIRUB SERPL-MCNC: 0.3 MG/DL
BUN SERPL-MCNC: 7.5 MG/DL (ref 6–20)
CALCIUM SERPL-MCNC: 9.7 MG/DL (ref 8.6–10)
CHLORIDE SERPL-SCNC: 104 MMOL/L (ref 98–107)
CREAT SERPL-MCNC: 0.48 MG/DL (ref 0.51–0.95)
DEPRECATED HCO3 PLAS-SCNC: 23 MMOL/L (ref 22–29)
EGFRCR SERPLBLD CKD-EPI 2021: >90 ML/MIN/1.73M2
ERYTHROCYTE [DISTWIDTH] IN BLOOD BY AUTOMATED COUNT: 12.8 % (ref 10–15)
GLUCOSE SERPL-MCNC: 89 MG/DL (ref 70–99)
HCT VFR BLD AUTO: 38.1 % (ref 35–47)
HGB BLD-MCNC: 12.4 G/DL (ref 11.7–15.7)
MCH RBC QN AUTO: 29.9 PG (ref 26.5–33)
MCHC RBC AUTO-ENTMCNC: 32.5 G/DL (ref 31.5–36.5)
MCV RBC AUTO: 92 FL (ref 78–100)
PLATELET # BLD AUTO: 241 10E3/UL (ref 150–450)
POTASSIUM SERPL-SCNC: 4.2 MMOL/L (ref 3.4–5.3)
PROT SERPL-MCNC: 7.4 G/DL (ref 6.4–8.3)
RBC # BLD AUTO: 4.15 10E6/UL (ref 3.8–5.2)
SODIUM SERPL-SCNC: 137 MMOL/L (ref 135–145)
TSH SERPL DL<=0.005 MIU/L-ACNC: 1.32 UIU/ML (ref 0.3–4.2)
WBC # BLD AUTO: 6 10E3/UL (ref 4–11)

## 2023-09-29 PROCEDURE — 36415 COLL VENOUS BLD VENIPUNCTURE: CPT | Performed by: FAMILY MEDICINE

## 2023-09-29 PROCEDURE — 93000 ELECTROCARDIOGRAM COMPLETE: CPT | Performed by: FAMILY MEDICINE

## 2023-09-29 PROCEDURE — 84443 ASSAY THYROID STIM HORMONE: CPT | Performed by: FAMILY MEDICINE

## 2023-09-29 PROCEDURE — 99214 OFFICE O/P EST MOD 30 MIN: CPT | Mod: 25 | Performed by: FAMILY MEDICINE

## 2023-09-29 PROCEDURE — 80053 COMPREHEN METABOLIC PANEL: CPT | Performed by: FAMILY MEDICINE

## 2023-09-29 PROCEDURE — 85027 COMPLETE CBC AUTOMATED: CPT | Performed by: FAMILY MEDICINE

## 2023-09-29 RX ORDER — ERGOCALCIFEROL (VITAMIN D2) 10 MCG
TABLET ORAL
COMMUNITY

## 2023-09-29 ASSESSMENT — PAIN SCALES - GENERAL: PAINLEVEL: NO PAIN (0)

## 2023-09-29 ASSESSMENT — ENCOUNTER SYMPTOMS: PALPITATIONS: 1

## 2023-09-29 NOTE — TELEPHONE ENCOUNTER
Pt was transferred from central scheduling   She has been having heart palpitations consistently and was told to be seen by a provider or go into UC.   Pt is a medical resident.   [facility] did not have any apt. Today.   Added to schedule today,   Thanks,   Rick Almonte RN  [facility]

## 2023-09-29 NOTE — PROGRESS NOTES
"  Assessment & Plan     Palpitations  More frequent the last 2 days, lasted about a minute, felt slightly light headed  - EKG 12-lead complete w/read -  was normal   - CBC with platelets; Future  - Comprehensive metabolic panel (BMP + Alb, Alk Phos, ALT, AST, Total. Bili, TP); Future  - TSH with free T4 reflex; Future  - CBC with platelets  - Comprehensive metabolic panel (BMP + Alb, Alk Phos, ALT, AST, Total. Bili, TP)  - TSH with free T4 reflex  If continues get Holter/ Zio patch  Subclinical hyperthyroidism  Recheck TSH    History of anemia  Had resolved ( IUD) recheck             BMI:   Estimated body mass index is 29.53 kg/m  as calculated from the following:    Height as of this encounter: 1.753 m (5' 9\").    Weight as of this encounter: 90.7 kg (200 lb).       Regular exercise  See Patient Instructions    Alvaro Ordoñez MD  [facility] is a 33 year old, presenting for the following health issues:  Irregular Heart Beat (Feels like heart is occasionally skipping a beat)      9/29/2023     3:24 PM   Additional Questions   Roomed by Rocio BUTT       History of Present Illness       Reason for visit:  Palpitations  Symptom onset:  3-4 weeks ago  Symptom intensity:  Mild  Symptom progression:  Worsening  Had these symptoms before:  No  What makes it worse:  None  What makes it better:  No    She eats 4 or more servings of fruits and vegetables daily.She consumes 0 sweetened beverage(s) daily.She exercises with enough effort to increase her heart rate 10 to 19 minutes per day.  She exercises with enough effort to increase her heart rate 3 or less days per week.   She is taking medications regularly.                 Review of Systems   Cardiovascular:  Positive for palpitations.      Constitutional, HEENT, cardiovascular, pulmonary, gi and gu systems are negative, except as otherwise noted.      Objective    /70 (BP Location: Left arm, Patient Position: " "Sitting, Cuff Size: Adult Large)   Pulse 98   Temp 97.9  F (36.6  C) (Temporal)   Resp 20   Ht 1.753 m (5' 9\")   Wt 90.7 kg (200 lb)   LMP  (LMP Unknown)   SpO2 99%   BMI 29.53 kg/m    Body mass index is 29.53 kg/m .  Physical Exam   GENERAL: healthy, alert and no distress  NECK: no adenopathy, no asymmetry, masses, or scars and thyroid normal to palpation  RESP: lungs clear to auscultation - no rales, rhonchi or wheezes  CV: regular rate and rhythm, normal S1 S2, no S3 or S4, no murmur, click or rub, no peripheral edema and peripheral pulses strong  ABDOMEN: soft, nontender, no hepatosplenomegaly, no masses and bowel sounds normal  MS: no gross musculoskeletal defects noted, no edema  SKIN: no suspicious lesions or rashes  NEURO: Normal strength and tone, mentation intact and speech normal  LYMPH: no cervical, supraclavicular, axillary, or inguinal adenopathy    EKG - Reviewed and interpreted by me appears normal, NSR, normal axis, normal intervals, no acute ST/T changes c/w ischemia, no LVH by voltage criteria, unchanged from previous tracings                  "

## 2023-10-07 ENCOUNTER — HEALTH MAINTENANCE LETTER (OUTPATIENT)
Age: 33
End: 2023-10-07

## 2023-10-19 ENCOUNTER — MYC MEDICAL ADVICE (OUTPATIENT)
Dept: FAMILY MEDICINE | Facility: CLINIC | Age: 33
End: 2023-10-19
Payer: COMMERCIAL

## 2023-10-19 DIAGNOSIS — R00.2 PALPITATIONS: Primary | ICD-10-CM

## 2023-10-19 NOTE — TELEPHONE ENCOUNTER
Per pt shyla would like to have zio patch/holter as still having palpitations.    Pended order, please advise.    Thanks!  Porter Holguin RN   [facility]

## 2023-10-23 NOTE — TELEPHONE ENCOUNTER
Pt sent [employer] message to schedule to have helter monitor placed.   Thanks,   Rick Almonte RN  [facility]

## 2023-11-15 ENCOUNTER — TELEPHONE (OUTPATIENT)
Dept: FAMILY MEDICINE | Facility: CLINIC | Age: 33
End: 2023-11-15
Payer: COMMERCIAL

## 2023-11-15 ENCOUNTER — E-VISIT (OUTPATIENT)
Dept: FAMILY MEDICINE | Facility: CLINIC | Age: 33
End: 2023-11-15
Payer: COMMERCIAL

## 2023-11-15 DIAGNOSIS — R50.9 FEVER, UNSPECIFIED FEVER CAUSE: ICD-10-CM

## 2023-11-15 DIAGNOSIS — B34.9 VIRAL SYNDROME: ICD-10-CM

## 2023-11-15 DIAGNOSIS — R50.9 FEVER, UNSPECIFIED FEVER CAUSE: Primary | ICD-10-CM

## 2023-11-15 PROCEDURE — 99421 OL DIG E/M SVC 5-10 MIN: CPT | Performed by: FAMILY MEDICINE

## 2023-11-15 RX ORDER — CETIRIZINE HYDROCHLORIDE 10 MG/1
10 TABLET ORAL DAILY
Qty: 30 TABLET | Refills: 3 | Status: SHIPPED | OUTPATIENT
Start: 2023-11-15 | End: 2023-11-16

## 2023-11-15 RX ORDER — BENZONATATE 100 MG/1
100 CAPSULE ORAL 3 TIMES DAILY PRN
Qty: 30 CAPSULE | Refills: 0 | Status: SHIPPED | OUTPATIENT
Start: 2023-11-15

## 2023-11-15 NOTE — PATIENT INSTRUCTIONS
Viral Respiratory Infection: Care Instructions  Overview     A viral respiratory infection is an infection of the nose, sinuses, or throat caused by a virus. Colds and the flu are common types of viral respiratory infections.  The symptoms of a viral respiratory infection often start quickly. They include a fever, sore throat, and runny nose. You may also just not feel well. Or you may not want to eat much.  Most viral infections can be treated with home care. This may include drinking lots of fluids and taking over-the-counter pain medicine. You will probably feel better in 4 to 10 days.  Antibiotics are not used to treat a viral infection. Antibiotics don't kill viruses, so they won't help cure a viral illness.  In some cases, a doctor may prescribe antiviral medicine to help your body fight a serious viral infection.  Follow-up care is a key part of your treatment and safety. Be sure to make and go to all appointments, and call your doctor if you are having problems. It's also a good idea to know your test results and keep a list of the medicines you take.  How can you care for yourself at home?  To prevent dehydration, drink plenty of fluids. Choose water and other clear liquids until you feel better. If you have kidney, heart, or liver disease and have to limit fluids, talk with your doctor before you increase the amount of fluids you drink.  Ask your doctor if you can take an over-the-counter pain medicine, such as acetaminophen (Tylenol), ibuprofen (Advil, Motrin), or naproxen (Aleve). Be safe with medicines. Read and follow all instructions on the label. No one younger than 20 should take aspirin. It has been linked to Reye syndrome, a serious illness.  Be careful when taking over-the-counter cold or flu medicines and Tylenol at the same time. Many of these medicines have acetaminophen, which is Tylenol. Read the labels to make sure that you are not taking more than the recommended dose. Too much  "acetaminophen (Tylenol) can be harmful.  Get plenty of rest.  Use saline (saltwater) nasal washes to help keep your nasal passages open and wash out mucus and allergens. You can buy saline nose sprays at a grocery store or drugstore. Follow the instructions on the package. Or you can make your own at home. Add 1 teaspoon of non-iodized salt and 1 teaspoon of baking soda to 2 cups of distilled or boiled and cooled water. Fill a squeeze bottle or neti pot with the nasal wash. Then put the tip into your nostril, and lean over the sink. With your mouth open, gently squirt the liquid. Repeat on the other side.  Use a vaporizer or humidifier to add moisture to your bedroom. Follow the instructions for cleaning the machine.  Do not smoke or allow others to smoke around you. If you need help quitting, talk to your doctor about stop-smoking programs and medicines. These can increase your chances of quitting for good.  When should you call for help?   Call 911 anytime you think you may need emergency care. For example, call if:    You have severe trouble breathing.   Call your doctor now or seek immediate medical care if:    You have a new or higher fever.     Your fever lasts more than 48 hours.     You have trouble breathing.     You have a fever with a stiff neck or a severe headache.     You are sensitive to light.     You feel very sleepy or confused.   Watch closely for changes in your health, and be sure to contact your doctor if:    You do not get better as expected.   Where can you learn more?  Go to https://www.[employer].net/patiented  Enter Q795 in the search box to learn more about \"Viral Respiratory Infection: Care Instructions.\"  Current as of: June 13, 2023               Content Version: 13.8    1397-6441 [employer].   Care instructions adapted under license by your healthcare professional. If you have questions about a medical condition or this instruction, always ask your healthcare " professional. [employer] disclaims any warranty or liability for your use of this information.

## 2023-11-15 NOTE — TELEPHONE ENCOUNTER
Patient calling  Experiencing congestion, loss of taste and smell that began yesterday  At home covid test was negative  Requesting PCR test  Advised to submit an evisit for PCP to place covid test order  Will call back with further questions or concerns   Ruby HERNANDEZ RN

## 2023-11-16 RX ORDER — CETIRIZINE HYDROCHLORIDE 10 MG/1
10 TABLET ORAL DAILY
Qty: 90 TABLET | Refills: 0 | Status: SHIPPED | OUTPATIENT
Start: 2023-11-16

## 2023-11-19 ASSESSMENT — ENCOUNTER SYMPTOMS
SHORTNESS OF BREATH: 0
SORE THROAT: 0
EYE PAIN: 0
FEVER: 0
COUGH: 0
HEADACHES: 0
ARTHRALGIAS: 0
HEMATOCHEZIA: 1
PALPITATIONS: 0
PARESTHESIAS: 0
DIARRHEA: 0
HEMATURIA: 0
NERVOUS/ANXIOUS: 0
BREAST MASS: 0
DYSURIA: 0
JOINT SWELLING: 0
FREQUENCY: 0
CHILLS: 0
NAUSEA: 0
MYALGIAS: 0
HEARTBURN: 0
CONSTIPATION: 0
ABDOMINAL PAIN: 0

## 2023-11-20 ENCOUNTER — OFFICE VISIT (OUTPATIENT)
Dept: FAMILY MEDICINE | Facility: CLINIC | Age: 33
End: 2023-11-20
Payer: COMMERCIAL

## 2023-11-20 VITALS
TEMPERATURE: 97.1 F | RESPIRATION RATE: 20 BRPM | OXYGEN SATURATION: 98 % | DIASTOLIC BLOOD PRESSURE: 78 MMHG | SYSTOLIC BLOOD PRESSURE: 108 MMHG | HEART RATE: 83 BPM | WEIGHT: 196 LBS | BODY MASS INDEX: 29.03 KG/M2 | HEIGHT: 69 IN

## 2023-11-20 DIAGNOSIS — Z87.19 HISTORY OF HEMORRHOIDS: ICD-10-CM

## 2023-11-20 DIAGNOSIS — Z00.00 ROUTINE GENERAL MEDICAL EXAMINATION AT A HEALTH CARE FACILITY: Primary | ICD-10-CM

## 2023-11-20 DIAGNOSIS — K62.5 BRBPR (BRIGHT RED BLOOD PER RECTUM): ICD-10-CM

## 2023-11-20 LAB
ALBUMIN SERPL BCG-MCNC: 4.1 G/DL (ref 3.5–5.2)
ALP SERPL-CCNC: 62 U/L (ref 40–150)
ALT SERPL W P-5'-P-CCNC: 20 U/L (ref 0–50)
ANION GAP SERPL CALCULATED.3IONS-SCNC: 10 MMOL/L (ref 7–15)
AST SERPL W P-5'-P-CCNC: 21 U/L (ref 0–45)
BILIRUB SERPL-MCNC: 0.3 MG/DL
BUN SERPL-MCNC: 4.6 MG/DL (ref 6–20)
CALCIUM SERPL-MCNC: 9.5 MG/DL (ref 8.6–10)
CHLORIDE SERPL-SCNC: 107 MMOL/L (ref 98–107)
CHOLEST SERPL-MCNC: 145 MG/DL
CREAT SERPL-MCNC: 0.56 MG/DL (ref 0.51–0.95)
DEPRECATED HCO3 PLAS-SCNC: 26 MMOL/L (ref 22–29)
EGFRCR SERPLBLD CKD-EPI 2021: >90 ML/MIN/1.73M2
GLUCOSE SERPL-MCNC: 89 MG/DL (ref 70–99)
HBA1C MFR BLD: 5.2 % (ref 0–5.6)
HDLC SERPL-MCNC: 41 MG/DL
LDLC SERPL CALC-MCNC: 93 MG/DL
NONHDLC SERPL-MCNC: 104 MG/DL
POTASSIUM SERPL-SCNC: 3.7 MMOL/L (ref 3.4–5.3)
PROT SERPL-MCNC: 7.3 G/DL (ref 6.4–8.3)
SODIUM SERPL-SCNC: 143 MMOL/L (ref 135–145)
TRIGL SERPL-MCNC: 54 MG/DL

## 2023-11-20 PROCEDURE — 80061 LIPID PANEL: CPT | Performed by: FAMILY MEDICINE

## 2023-11-20 PROCEDURE — 99395 PREV VISIT EST AGE 18-39: CPT | Performed by: FAMILY MEDICINE

## 2023-11-20 PROCEDURE — 83036 HEMOGLOBIN GLYCOSYLATED A1C: CPT | Performed by: FAMILY MEDICINE

## 2023-11-20 PROCEDURE — 80053 COMPREHEN METABOLIC PANEL: CPT | Performed by: FAMILY MEDICINE

## 2023-11-20 PROCEDURE — 36415 COLL VENOUS BLD VENIPUNCTURE: CPT | Performed by: FAMILY MEDICINE

## 2023-11-20 ASSESSMENT — ENCOUNTER SYMPTOMS
HEADACHES: 0
DIARRHEA: 0
SHORTNESS OF BREATH: 0
HEMATURIA: 0
CONSTIPATION: 0
HEMATOCHEZIA: 1
FREQUENCY: 0
PARESTHESIAS: 0
EYE PAIN: 0
FEVER: 0
CHILLS: 0
COUGH: 0
PALPITATIONS: 0
ABDOMINAL PAIN: 0
MYALGIAS: 0
BREAST MASS: 0
ARTHRALGIAS: 0
DYSURIA: 0
HEARTBURN: 0
SORE THROAT: 0
NERVOUS/ANXIOUS: 0
NAUSEA: 0
JOINT SWELLING: 0

## 2023-11-20 ASSESSMENT — PAIN SCALES - GENERAL: PAINLEVEL: NO PAIN (0)

## 2023-11-20 NOTE — PROGRESS NOTES
SUBJECTIVE:   Jenae is a 33 year old, presenting for the following:  Physical        11/20/2023     7:08 AM   Additional Questions   Roomed by rhea cash   Accompanied by red         11/20/2023     7:08 AM   Patient Reported Additional Medications   Patient reports taking the following new medications n/a       Healthy Habits:     Getting at least 3 servings of Calcium per day:  Yes    Bi-annual eye exam:  Yes    Dental care twice a year:  NO    Sleep apnea or symptoms of sleep apnea:  None    Diet:  Regular (no restrictions)    Frequency of exercise:  None    Taking medications regularly:  Yes    Medication side effects:  Not applicable    Additional concerns today:  Yes        (Z00.00) Routine general medical examination at a health care facility  (primary encounter diagnosis)  Comment: Interested in labs for diabetes and lipids - is not nursing  Had a really hard time tolerating covid vaccines - declines but will mask up    (K62.5) BRBPR (bright red blood per rectum)  Comment:  Has had bloody stools intermittently  Thought to be hemorrhoids  Has noted more narrow stools  Feels bloated at times  Small amount of blood noted at times  Has blood in stools once -twice per month  Has had this for 2-3 years  Thinks she had hemorrhoids - has had 2 vaginal births  Has not worsened     Has IUD - working well for her      Today's PHQ-2 Score:       11/19/2023     9:16 PM   PHQ-2 ( 1999 [employer])   Q1: Little interest or pleasure in doing things 0   Q2: Feeling down, depressed or hopeless 0   PHQ-2 Score 0   Q1: Little interest or pleasure in doing things Not at all   Q2: Feeling down, depressed or hopeless Not at all   PHQ-2 Score 0                   Have you ever done Advance Care Planning? (For example, a Health Directive, POLST, or a discussion with a medical provider or your loved ones about your wishes): No, advance care planning information given to patient to review.  Advanced care planning was discussed at today's  visit.    Social History     Tobacco Use    Smoking status: Never    Smokeless tobacco: Never   Substance Use Topics    Alcohol use: No             11/19/2023     9:15 PM   Alcohol Use   Prescreen: >3 drinks/day or >7 drinks/week? No     Reviewed orders with patient.  Reviewed health maintenance and updated orders accordingly - Yes      Breast Cancer Screening:    FHS-7:       10/19/2021     3:12 PM   Breast CA Risk Assessment (FHS-7)   Did any of your first-degree relatives have breast or ovarian cancer? No   Did any of your relatives have bilateral breast cancer? No   Did any man in your family have breast cancer? No   Did any woman in your family have breast and ovarian cancer? No   Did any woman in your family have breast cancer before age 50 y? No   Do you have 2 or more relatives with breast and/or ovarian cancer? No   Do you have 2 or more relatives with breast and/or bowel cancer? No         Pertinent mammograms are reviewed under the imaging tab.    History of abnormal Pap smear: NO - age 30-65 PAP every 5 years with negative HPV co-testing recommended      Latest Ref Rng & Units 8/15/2022     2:51 PM   PAP / HPV   PAP  Negative for Intraepithelial Lesion or Malignancy (NILM)    HPV 16 DNA Negative Negative    HPV 18 DNA Negative Negative    Other HR HPV Negative Negative      Reviewed and updated as needed this visit by clinical staff                  Reviewed and updated as needed this visit by Provider                 Past Medical History:   Diagnosis Date    Ulcer, gastric, acute     Duodenal ulcer.      History reviewed. No pertinent surgical history.    Review of Systems   Constitutional:  Negative for chills and fever.   HENT:  Positive for ear pain. Negative for congestion, hearing loss and sore throat.    Eyes:  Negative for pain.   Respiratory:  Negative for cough and shortness of breath.    Cardiovascular:  Negative for chest pain, palpitations and peripheral edema.   Gastrointestinal:  Positive  "for hematochezia. Negative for abdominal pain, constipation, diarrhea, heartburn and nausea.   Breasts:  Negative for tenderness, breast mass and discharge.   Genitourinary:  Negative for dysuria, frequency, genital sores, hematuria, pelvic pain, urgency, vaginal bleeding and vaginal discharge.   Musculoskeletal:  Negative for arthralgias, joint swelling and myalgias.   Skin:  Negative for rash.   Neurological:  Negative for headaches and paresthesias.   Psychiatric/Behavioral:  Negative for mood changes. The patient is not nervous/anxious.           OBJECTIVE:   /78 (BP Location: Left arm, Patient Position: Sitting, Cuff Size: Adult Regular)   Pulse 83   Temp 97.1  F (36.2  C) (Temporal)   Resp 20   Ht 1.753 m (5' 9\")   Wt 88.9 kg (196 lb)   LMP  (LMP Unknown)   SpO2 98%   BMI 28.94 kg/m    Physical Exam  GENERAL: healthy, alert and no distress  EYES: Eyes grossly normal to inspection, PERRL and conjunctivae and sclerae normal  HENT: ear canals and TM's normal, nose and mouth without ulcers or lesions  NECK: no adenopathy, no asymmetry, masses, or scars and thyroid normal to palpation  RESP: lungs clear to auscultation - no rales, rhonchi or wheezes  BREAST: normal without masses, tenderness or nipple discharge and no palpable axillary masses or adenopathy  CV: regular rate and rhythm, normal S1 S2, no S3 or S4, no murmur, click or rub, no peripheral edema and peripheral pulses strong  ABDOMEN: soft, nontender, no hepatosplenomegaly, no masses and bowel sounds normal  MS: no gross musculoskeletal defects noted, no edema  SKIN: no suspicious lesions or rashes  NEURO: Normal strength and tone, mentation intact and speech normal  PSYCH: mentation appears normal, affect normal/bright  Declined rectal exam    Diagnostic Test Results:  Labs reviewed in Epic    ASSESSMENT/PLAN:   (Z00.00) Routine general medical examination at a health care facility  (primary encounter diagnosis)  Comment:   Plan: " "Comprehensive metabolic panel (BMP + Alb, Alk         Phos, ALT, AST, Total. Bili, TP), Hemoglobin         A1c, Lipid panel reflex to direct LDL Fasting            (K62.5) BRBPR (bright red blood per rectum)  Comment: advised to try fiber supplements at night to see if this helps stools to regulate if not or if bleeding worsens should see CR    Plan: Adult Colorectal Surgery  Referral            (Z87.19) History of hemorrhoids  Comment:   Plan: Adult Colorectal Surgery  Referral                    COUNSELING:  Reviewed preventive health counseling, as reflected in patient instructions      BMI:   Estimated body mass index is 29.53 kg/m  as calculated from the following:    Height as of 9/29/23: 1.753 m (5' 9\").    Weight as of 9/29/23: 90.7 kg (200 lb).         She reports that she has never smoked. She has never used smokeless tobacco.          Eliz Gordon MD  [facility]  "

## 2023-11-22 NOTE — RESULT ENCOUNTER NOTE
Hello,    The labs show electrolytes look very good.  Your BUN was low and this could be reflecting that you are not eating enough or perhaps hydrating a lot.  If you feel well then I am not concerned.  The rest of the labs show normal liver function and kidney function.    The lipid panel was excellent see if you can boost that  HDL cholesterol with aerobic exercise even small spurts can help throughout the day    The A1c showed no diabetes you have actually improved significantly which is phenomenal    Eliz Gordon MD

## 2024-02-10 PROCEDURE — 87635 SARS-COV-2 COVID-19 AMP PRB: CPT | Performed by: INTERNAL MEDICINE

## 2024-02-12 ENCOUNTER — DOCUMENTATION ONLY (OUTPATIENT)
Facility: CLINIC | Age: 34
End: 2024-02-12

## 2024-02-12 ENCOUNTER — LAB REQUISITION (OUTPATIENT)
Dept: LAB | Facility: CLINIC | Age: 34
End: 2024-02-12

## 2024-02-12 ENCOUNTER — DOCUMENTATION ONLY (OUTPATIENT)
Dept: FAMILY MEDICINE | Facility: CLINIC | Age: 34
End: 2024-02-12

## 2024-02-12 DIAGNOSIS — R68.89 FLU-LIKE SYMPTOMS: Primary | ICD-10-CM

## 2024-02-12 LAB — SARS-COV-2 RNA RESP QL NAA+PROBE: NEGATIVE

## 2024-02-12 NOTE — PROGRESS NOTES
This patient has a future lab only appointment tomorrow 02/13/2024 and needs orders for Flu like symptoms. Please send orders. Thanks [facility]

## 2024-02-12 NOTE — PROGRESS NOTES
This patent has a future lab only appointment today 02/12/2024 and needs orders. Please send orders. Thanks carlos

## 2024-03-19 NOTE — TELEPHONE ENCOUNTER
Letter printed and mailed to patient.    Thank you,  Britta ANDERSON  [facility]  Team Yani Coordinator     Previous results printed and given to Lavern Heller to review

## 2024-11-12 NOTE — PROGRESS NOTES
History of Present Illness - Jenae Renee is a very pleasant 34 year old female here to see me for the first time for tonsil problems. 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    She actually has several issues to discuss    The first is that for several years she has had issues with her ears and her throat feeling itchy.  It can be bad enough that it has waken her up.  She has tried Allegra, and other OTC antihistamines.  That makes it better, but not completely resolves it.  She has also tried Flonase as well.  She initially thought it might be seasonal, but it is there all year round.  There was no change int he home environment before this started.    The next issue is her tonsils.  She tells me that about two years ago she started having issues with her tonsils.  She started having recurrent issues with her tonsils, and started to cough up tonsil stones.  She has tried salt water gargles and water picks, as well as cleaning them out, but they keep forming.    Past Medical History -   Patient Active Problem List   Diagnosis    Subclinical hyperthyroidism    Tinea cruris    Iron deficiency anemia, unspecified iron deficiency anemia type    Menorrhagia with regular cycle    Encounter for IUD insertion       Current Medications -   Current Outpatient Medications:     benzonatate (TESSALON) 100 MG capsule, Take 1 capsule (100 mg) by mouth 3 times daily as needed for cough, Disp: 30 capsule, Rfl: 0    cetirizine (ZYRTEC) 10 MG tablet, TAKE 1 TABLET(10 MG) BY MOUTH DAILY, Disp: 90 tablet, Rfl: 0    hydrocortisone (CORTAID) 1 % external cream, Apply topically 2 times daily, Disp: 30 g, Rfl: 0    levonorgestrel (NIKKIE) 13.5 MG IUD, 1 each (13.5 mg) by Intrauterine route once, Disp: , Rfl:     Vitamin D, Cholecalciferol, 10 MCG (400 UNIT) TABS, , Disp: , Rfl:     Allergies -   Allergies   Allergen Reactions    Nsaids Other (See Comments)     States she had a duodenal ulcer and her MD told her never to  take NSAIDS.       Social History -   Social History     Socioeconomic History    Marital status:      Spouse name: Dina     Number of children: 1    Years of education: 16    Highest education level: Bachelor's degree (e.g., BA, AB, BS)   Tobacco Use    Smoking status: Never    Smokeless tobacco: Never   Vaping Use    Vaping status: Never Used   Substance and Sexual Activity    Alcohol use: No    Drug use: No    Sexual activity: Yes     Partners: Male     Birth control/protection: I.U.D.     Social Drivers of Health     Financial Resource Strain: Low Risk  (11/19/2023)    Financial Resource Strain     Within the past 12 months, have you or your family members you live with been unable to get utilities (heat, electricity) when it was really needed?: No   Food Insecurity: Low Risk  (11/19/2023)    Food Insecurity     Within the past 12 months, did you worry that your food would run out before you got money to buy more?: No     Within the past 12 months, did the food you bought just not last and you didn t have money to get more?: No   Transportation Needs: Low Risk  (11/19/2023)    Transportation Needs     Within the past 12 months, has lack of transportation kept you from medical appointments, getting your medicines, non-medical meetings or appointments, work, or from getting things that you need?: No   Interpersonal Safety: Low Risk  (11/20/2023)    Interpersonal Safety     Do you feel physically and emotionally safe where you currently live?: Yes     Within the past 12 months, have you been hit, slapped, kicked or otherwise physically hurt by someone?: No     Within the past 12 months, have you been humiliated or emotionally abused in other ways by your partner or ex-partner?: No   Housing Stability: Low Risk  (11/19/2023)    Housing Stability     Do you have housing? : Yes     Are you worried about losing your housing?: No       Family History -   Family History   Problem Relation Age of Onset     "Diabetes Father     Hypertension Father     Cerebrovascular Disease Father     Other Cancer Father         hepatocellular CA hep C    Obesity Sister     Obesity Brother     Diabetes Maternal Grandmother     Hypertension Maternal Grandmother     Diabetes Maternal Grandfather     Obesity Paternal Grandfather        Review of Systems - As per HPI and PMHx, otherwise 10+ system review of the head and neck, and general constitution is negative.    Physical Exam  /78 (BP Location: Right arm, Patient Position: Sitting, Cuff Size: Adult Large)   Pulse 69   Ht 1.75 m (5' 8.9\")   Wt 96.3 kg (212 lb 3.2 oz)   SpO2 99%   BMI 31.43 kg/m        General - The patient is well nourished and well developed, and appears to have good nutritional status.  Alert and oriented to person and place, answers questions and cooperates with examination appropriately.   Head and Face - Normocephalic and atraumatic, with no gross asymmetry noted of the contour of the facial features.  The facial nerve is intact, with strong symmetric movements.  Voice and Breathing - The patient was breathing comfortably without the use of accessory muscles. There was no wheezing, stridor, or stertor.  The patients voice was clear and strong, and had appropriate pitch and quality.  Ears - The tympanic membranes are normal in appearance, bony landmarks are intact.  No retraction, perforation, or masses.  No fluid or purulence was seen in the external canal or the middle ear. No evidence of infection of the middle ear or external canal, cerumen was normal in appearance.  Eyes - Extraocular movements intact, and the pupils were reactive to light.  Sclera were not icteric or injected, conjunctiva were pink and moist.  Mouth - Examination of the oral cavity showed pink, healthy oral mucosa. No lesions or ulcerations noted.  The tongue was mobile and midline, and the dentition were in good condition.    Throat - The walls of the oropharynx were smooth, pink, " moist, symmetric, and had no lesions or ulcerations.  The tonsillar pillars and soft palate were symmetric.  The uvula was midline on elevation.    Neck - Normal midline excursion of the laryngotracheal complex during swallowing.  Full range of motion on passive movement.  Palpation of the occipital, submental, submandibular, internal jugular chain, and supraclavicular nodes did not demonstrate any abnormal lymph nodes or masses.  The carotid pulse was palpable bilaterally.  Palpation of the thyroid was soft and smooth, with no nodules or goiter appreciated.  The trachea was mobile and midline.  Nose - External contour is symmetric, no gross deflection or scars.  Nasal mucosa is pink and moist with no abnormal mucus.  The septum was midline and non-obstructive, turbinates of normal size and position.  No polyps, masses, or purulence noted on examination.      A/P - Jenae Renee is a 34 year old female  (J31.0) Chronic rhinitis  (primary encounter diagnosis)  (J35.01) Chronic tonsillitis    Based on the physical exam and history, my recommendation is for tonsillectomy (with or without adenoidectomy).  The remainder of the visit was spent discussing the risks and benefits of tonsillectomy.  These included:  The risks of general anesthesia, bleeding, infection, possible need for emergency surgery to control bleeding, possible alteration of speech and swallowing, and even the possibility of continued throat problems following surgery.  They understood and will call if she wants it done.    For the next issue, I am confident this is atopy and will treat with budesonide in [facility].    If that does not work, other things to explore would be trying Singulair, or combined steroid nasal sprays, allergy testing or food allergy testing, Also, we could try newer anthistmaines like Xyzal.

## 2024-11-21 ENCOUNTER — OFFICE VISIT (OUTPATIENT)
Dept: OTOLARYNGOLOGY | Facility: CLINIC | Age: 34
End: 2024-11-21
Payer: COMMERCIAL

## 2024-11-21 VITALS
HEART RATE: 69 BPM | HEIGHT: 69 IN | OXYGEN SATURATION: 99 % | WEIGHT: 212.2 LBS | BODY MASS INDEX: 31.43 KG/M2 | SYSTOLIC BLOOD PRESSURE: 115 MMHG | DIASTOLIC BLOOD PRESSURE: 78 MMHG

## 2024-11-21 DIAGNOSIS — J35.01 CHRONIC TONSILLITIS: ICD-10-CM

## 2024-11-21 DIAGNOSIS — J31.0 CHRONIC RHINITIS: Primary | ICD-10-CM

## 2024-11-21 RX ORDER — FEXOFENADINE HCL 180 MG/1
180 TABLET ORAL DAILY
COMMUNITY

## 2024-11-21 RX ORDER — BUDESONIDE 1 MG/2ML
INHALANT ORAL
Qty: 60 ML | Refills: 12 | Status: SHIPPED | OUTPATIENT
Start: 2024-11-21

## 2024-11-21 NOTE — LETTER
11/21/2024      Jenae Renee  [address]  [place] [address]      Dear Colleague,    Thank you for referring your patient, Jenae Renee, to the [facility]. Please see a copy of my visit note below.    History of Present Illness - Jenae Renee is a very pleasant 34 year old female here to see me for the first time for tonsil problems. 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  It can be bad enough that it has waken her up.  She has tried Allegra, and other OTC antihistamines.  That makes it better, but not completely resolves it.  She has also tried Flonase as well.  She initially thought it might be seasonal, but it is there all year round.  There was no change int he home environment before this started.    The next issue is her tonsils.  She tells me that about two years ago she started having issues with her tonsils.  She started having recurrent issues with her tonsils, and started to cough up tonsil stones.  She has tried salt water gargles and water picks, as well as cleaning them out, but they keep forming.    Past Medical History -   Patient Active Problem List   Diagnosis     Subclinical hyperthyroidism     Tinea cruris     Iron deficiency anemia, unspecified iron deficiency anemia type     Menorrhagia with regular cycle     Encounter for IUD insertion       Current Medications -   Current Outpatient Medications:      benzonatate (TESSALON) 100 MG capsule, Take 1 capsule (100 mg) by mouth 3 times daily as needed for cough, Disp: 30 capsule, Rfl: 0     cetirizine (ZYRTEC) 10 MG tablet, TAKE 1 TABLET(10 MG) BY MOUTH DAILY, Disp: 90 tablet, Rfl: 0     hydrocortisone (CORTAID) 1 % external cream, Apply topically 2 times daily, Disp: 30 g, Rfl: 0     levonorgestrel (NIKKIE) 13.5 MG IUD, 1 each (13.5 mg) by  Intrauterine route once, Disp: , Rfl:      Vitamin D, Cholecalciferol, 10 MCG (400 UNIT) TABS, , Disp: , Rfl:     Allergies -   Allergies   Allergen Reactions     Nsaids Other (See Comments)     States she had a duodenal ulcer and her MD told her never to take NSAIDS.       Social History -   Social History     Socioeconomic History     Marital status:      Spouse name: Dina      Number of children: 1     Years of education: 16     Highest education level: Bachelor's degree (e.g., BA, AB, BS)   Tobacco Use     Smoking status: Never     Smokeless tobacco: Never   Vaping Use     Vaping status: Never Used   Substance and Sexual Activity     Alcohol use: No     Drug use: No     Sexual activity: Yes     Partners: Male     Birth control/protection: I.U.D.     Social Drivers of Health     Financial Resource Strain: Low Risk  (11/19/2023)    Financial Resource Strain      Within the past 12 months, have you or your family members you live with been unable to get utilities (heat, electricity) when it was really needed?: No   Food Insecurity: Low Risk  (11/19/2023)    Food Insecurity      Within the past 12 months, did you worry that your food would run out before you got money to buy more?: No      Within the past 12 months, did the food you bought just not last and you didn t have money to get more?: No   Transportation Needs: Low Risk  (11/19/2023)    Transportation Needs      Within the past 12 months, has lack of transportation kept you from medical appointments, getting your medicines, non-medical meetings or appointments, work, or from getting things that you need?: No   Interpersonal Safety: Low Risk  (11/20/2023)    Interpersonal Safety      Do you feel physically and emotionally safe where you currently live?: Yes      Within the past 12 months, have you been hit, slapped, kicked or otherwise physically hurt by someone?: No      Within the past 12 months, have you been humiliated or emotionally abused in  "other ways by your partner or ex-partner?: No   Housing Stability: Low Risk  (11/19/2023)    Housing Stability      Do you have housing? : Yes      Are you worried about losing your housing?: No       Family History -   Family History   Problem Relation Age of Onset     Diabetes Father      Hypertension Father      Cerebrovascular Disease Father      Other Cancer Father         hepatocellular CA hep C     Obesity Sister      Obesity Brother      Diabetes Maternal Grandmother      Hypertension Maternal Grandmother      Diabetes Maternal Grandfather      Obesity Paternal Grandfather        Review of Systems - As per HPI and PMHx, otherwise 10+ system review of the head and neck, and general constitution is negative.    Physical Exam  /78 (BP Location: Right arm, Patient Position: Sitting, Cuff Size: Adult Large)   Pulse 69   Ht 1.75 m (5' 8.9\")   Wt 96.3 kg (212 lb 3.2 oz)   SpO2 99%   BMI 31.43 kg/m        General - The patient is well nourished and well developed, and appears to have good nutritional status.  Alert and oriented to person and place, answers questions and cooperates with examination appropriately.   Head and Face - Normocephalic and atraumatic, with no gross asymmetry noted of the contour of the facial features.  The facial nerve is intact, with strong symmetric movements.  Voice and Breathing - The patient was breathing comfortably without the use of accessory muscles. There was no wheezing, stridor, or stertor.  The patients voice was clear and strong, and had appropriate pitch and quality.  Ears - The tympanic membranes are normal in appearance, bony landmarks are intact.  No retraction, perforation, or masses.  No fluid or purulence was seen in the external canal or the middle ear. No evidence of infection of the middle ear or external canal, cerumen was normal in appearance.  Eyes - Extraocular movements intact, and the pupils were reactive to light.  Sclera were not icteric or " injected, conjunctiva were pink and moist.  Mouth - Examination of the oral cavity showed pink, healthy oral mucosa. No lesions or ulcerations noted.  The tongue was mobile and midline, and the dentition were in good condition.    Throat - The walls of the oropharynx were smooth, pink, moist, symmetric, and had no lesions or ulcerations.  The tonsillar pillars and soft palate were symmetric.  The uvula was midline on elevation.    Neck - Normal midline excursion of the laryngotracheal complex during swallowing.  Full range of motion on passive movement.  Palpation of the occipital, submental, submandibular, internal jugular chain, and supraclavicular nodes did not demonstrate any abnormal lymph nodes or masses.  The carotid pulse was palpable bilaterally.  Palpation of the thyroid was soft and smooth, with no nodules or goiter appreciated.  The trachea was mobile and midline.  Nose - External contour is symmetric, no gross deflection or scars.  Nasal mucosa is pink and moist with no abnormal mucus.  The septum was midline and non-obstructive, turbinates of normal size and position.  No polyps, masses, or purulence noted on examination.      A/P - Jenae Renee is a 34 year old female  (J31.0) Chronic rhinitis  (primary encounter diagnosis)  (J35.01) Chronic tonsillitis    Based on the physical exam and history, my recommendation is for tonsillectomy (with or without adenoidectomy).  The remainder of the visit was spent discussing the risks and benefits of tonsillectomy.  These included:  The risks of general anesthesia, bleeding, infection, possible need for emergency surgery to control bleeding, possible alteration of speech and swallowing, and even the possibility of continued throat problems following surgery.  They understood and will call if she wants it done.    For the next issue, I am confident this is atopy and will treat with budesonide in [facility].    If that does not work, other things to explore would be  trying Singulair, or combined steroid nasal sprays, allergy testing or food allergy testing, Also, we could try newer anthistmaines like Xyzal.      Again, thank you for allowing me to participate in the care of your patient.        Sincerely,        Ganga Pina MD

## 2024-12-11 ENCOUNTER — OFFICE VISIT (OUTPATIENT)
Dept: FAMILY MEDICINE | Facility: CLINIC | Age: 34
End: 2024-12-11
Payer: COMMERCIAL

## 2024-12-11 VITALS
HEART RATE: 74 BPM | OXYGEN SATURATION: 98 % | RESPIRATION RATE: 19 BRPM | DIASTOLIC BLOOD PRESSURE: 70 MMHG | SYSTOLIC BLOOD PRESSURE: 108 MMHG | WEIGHT: 209 LBS | HEIGHT: 69 IN | BODY MASS INDEX: 30.96 KG/M2 | TEMPERATURE: 97.4 F

## 2024-12-11 DIAGNOSIS — L65.9 HAIR LOSS: ICD-10-CM

## 2024-12-11 DIAGNOSIS — R63.5 WEIGHT GAIN: ICD-10-CM

## 2024-12-11 DIAGNOSIS — B35.6 TINEA INGUINALIS: ICD-10-CM

## 2024-12-11 DIAGNOSIS — Z00.00 ROUTINE GENERAL MEDICAL EXAMINATION AT A HEALTH CARE FACILITY: Primary | ICD-10-CM

## 2024-12-11 DIAGNOSIS — Z83.3 FAMILY HISTORY OF DIABETES MELLITUS: ICD-10-CM

## 2024-12-11 LAB
EST. AVERAGE GLUCOSE BLD GHB EST-MCNC: 108 MG/DL
HBA1C MFR BLD: 5.4 % (ref 0–5.6)

## 2024-12-11 PROCEDURE — 82627 DEHYDROEPIANDROSTERONE: CPT | Performed by: FAMILY MEDICINE

## 2024-12-11 PROCEDURE — 83036 HEMOGLOBIN GLYCOSYLATED A1C: CPT | Performed by: FAMILY MEDICINE

## 2024-12-11 PROCEDURE — 36415 COLL VENOUS BLD VENIPUNCTURE: CPT | Performed by: FAMILY MEDICINE

## 2024-12-11 PROCEDURE — 84270 ASSAY OF SEX HORMONE GLOBUL: CPT | Performed by: FAMILY MEDICINE

## 2024-12-11 RX ORDER — FLUCONAZOLE 200 MG/1
200 TABLET ORAL DAILY
Qty: 3 TABLET | Refills: 2 | Status: SHIPPED | OUTPATIENT
Start: 2024-12-11 | End: 2024-12-14

## 2024-12-11 SDOH — HEALTH STABILITY: PHYSICAL HEALTH: ON AVERAGE, HOW MANY DAYS PER WEEK DO YOU ENGAGE IN MODERATE TO STRENUOUS EXERCISE (LIKE A BRISK WALK)?: 2 DAYS

## 2024-12-11 SDOH — HEALTH STABILITY: PHYSICAL HEALTH: ON AVERAGE, HOW MANY MINUTES DO YOU ENGAGE IN EXERCISE AT THIS LEVEL?: 20 MIN

## 2024-12-11 ASSESSMENT — PAIN SCALES - GENERAL: PAINLEVEL_OUTOF10: NO PAIN (0)

## 2024-12-11 ASSESSMENT — SOCIAL DETERMINANTS OF HEALTH (SDOH): HOW OFTEN DO YOU GET TOGETHER WITH FRIENDS OR RELATIVES?: ONCE A WEEK

## 2024-12-11 NOTE — PROGRESS NOTES
Preventive Care Visit  [facility]  Eliz Gordon MD, Family Medicine  Dec 11, 2024  {Provider  Link to [facility] :507455}    {PROVIDER CHARTING PREFERENCE:008590}    Subjective   Jenae is a 34 year old, presenting for the following:  Physical        12/11/2024     3:57 PM   Additional Questions   Roomed by ALIS SALCIDO   Accompanied by DASHA GORDON         12/11/2024     3:57 PM   Patient Reported Additional Medications   Patient reports taking the following new medications N/A          HPI  ***  {MA/LPN/RN Pre-Provider Visit Orders- hCG/UA/Strep (Optional):058035}  {SUPERLIST (Optional):078933}  {additonal problems for provider to add (Optional):008755}  Health Care Directive  Patient does not have a Health Care Directive: Discussed advance care planning with patient; information given to patient to review.      12/11/2024   General Health   How would you rate your overall physical health? Good   Feel stress (tense, anxious, or unable to sleep) Not at all            12/11/2024   Nutrition   Three or more servings of calcium each day? Yes   Diet: Regular (no restrictions)   How many servings of fruit and vegetables per day? (!) 2-3   How many sweetened beverages each day? 0-1            12/11/2024   Exercise   Days per week of moderate/strenous exercise 2 days   Average minutes spent exercising at this level 20 min      (!) EXERCISE CONCERN      12/11/2024   Social Factors   Frequency of gathering with friends or relatives Once a week   Worry food won't last until get money to buy more No   Food not last or not have enough money for food? No   Do you have housing? (Housing is defined as stable permanent housing and does not include staying ouside in a car, in a tent, in an abandoned building, in an overnight shelter, or couch-surfing.) Yes   Are you worried about losing your housing? No   Lack of transportation? No   Unable to get utilities (heat,electricity)? No            12/11/2024   Dental    Dentist two times every year? (!) NO               Today's PHQ-2 Score:       12/11/2024     3:55 PM   PHQ-2 ( 1999 [employer])   Q1: Little interest or pleasure in doing things 0    Q2: Feeling down, depressed or hopeless 0    PHQ-2 Score 0    Q1: Little interest or pleasure in doing things Not at all   Q2: Feeling down, depressed or hopeless Not at all   PHQ-2 Score 0       Patient-reported           12/11/2024   Substance Use   Alcohol more than 3/day or more than 7/wk Not Applicable   Do you use any other substances recreationally? No        Social History     Tobacco Use    Smoking status: Never    Smokeless tobacco: Never   Vaping Use    Vaping status: Never Used   Substance Use Topics    Alcohol use: No    Drug use: No     {Provider  If there are gaps in the social history shown above, please follow the link to update and then refresh the note Link to Social and Substance History :111548}      10/19/2021   LAST FHS-7 RESULTS   1st degree relative breast or ovarian cancer No   Any relative bilateral breast cancer No   Any male have breast cancer No   Any ONE woman have BOTH breast AND ovarian cancer No   Any woman with breast cancer before 50yrs No   2 or more relatives with breast AND/OR ovarian cancer No   2 or more relatives with breast AND/OR bowel cancer No        {If any of the questions to the FHS7 are answered yes, consider referral for genetic counseling.    Additional indications for genetic referral include personal history of breast or ovarian cancer, genetic mutation in 1st degree relative which increases risk of breast cancer including BRCA1, BRCA2, YAIMA, PALB 2, TP53, CHEK2, PTEN, CDH1, STK11 (per ACS) and/or 1st degree relative with history of pancreatic or high-risk prostate cancer (per NCCN):781697}   {Mammogram Decision Support (Optional):543011}        12/11/2024   STI Screening   New sexual partner(s) since last STI/HIV test? No        History of abnormal Pap smear: { :847050}        Latest  "Ref Rng & Units 8/15/2022     2:51 PM   PAP / HPV   PAP  Negative for Intraepithelial Lesion or Malignancy (NILM)    HPV 16 DNA Negative Negative    HPV 18 DNA Negative Negative    Other HR HPV Negative Negative            12/11/2024   Contraception/Family Planning   Questions about contraception or family planning (!) YES ***        {Provider  REQUIRED FOR AWV Use the storyboard to review patient history, after sections have been marked as reviewed, refresh note to capture documentation:130571}   Reviewed and updated as needed this visit by Provider                    {HISTORY OPTIONS (Optional):637346}    {ROS Picklists (Optional):288924}     Objective    Exam  There were no vitals taken for this visit.   Estimated body mass index is 31.43 kg/m  as calculated from the following:    Height as of 11/21/24: 1.75 m (5' 8.9\").    Weight as of 11/21/24: 96.3 kg (212 lb 3.2 oz).    Physical Exam  {Exam Choices (Optional):208941}        Signed Electronically by: Eliz Gordon MD  {Email feedback regarding this note to [email or web address]   :996980}  "

## 2024-12-11 NOTE — PATIENT INSTRUCTIONS
Patient Education   Preventive Care Advice   This is general advice given by our system to help you stay healthy. However, your care team may have specific advice just for you. Please talk to your care team about your preventive care needs.  Nutrition  Eat 5 or more servings of fruits and vegetables each day.  Try wheat bread, brown rice and whole grain pasta (instead of white bread, rice, and pasta).  Get enough calcium and vitamin D. Check the label on foods and aim for 100% of the RDA (recommended daily allowance).  Lifestyle  Exercise at least 150 minutes each week  (30 minutes a day, 5 days a week).  Do muscle strengthening activities 2 days a week. These help control your weight and prevent disease.  No smoking.  Wear sunscreen to prevent skin cancer.  Have a dental exam and cleaning every 6 months.  Yearly exams  See your health care team every year to talk about:  Any changes in your health.  Any medicines your care team has prescribed.  Preventive care, family planning, and ways to prevent chronic diseases.  Shots (vaccines)   HPV shots (up to age 26), if you've never had them before.  Hepatitis B shots (up to age 59), if you've never had them before.  COVID-19 shot: Get this shot when it's due.  Flu shot: Get a flu shot every year.  Tetanus shot: Get a tetanus shot every 10 years.  Pneumococcal, hepatitis A, and RSV shots: Ask your care team if you need these based on your risk.  Shingles shot (for age 50 and up)  General health tests  Diabetes screening:  Starting at age 35, Get screened for diabetes at least every 3 years.  If you are younger than age 35, ask your care team if you should be screened for diabetes.  Cholesterol test: At age 39, start having a cholesterol test every 5 years, or more often if advised.  Bone density scan (DEXA): At age 50, ask your care team if you should have this scan for osteoporosis (brittle bones).  Hepatitis C: Get tested at least once in your life.  STIs (sexually  transmitted infections)  Before age 24: Ask your care team if you should be screened for STIs.  After age 24: Get screened for STIs if you're at risk. You are at risk for STIs (including HIV) if:  You are sexually active with more than one person.  You don't use condoms every time.  You or a partner was diagnosed with a sexually transmitted infection.  If you are at risk for HIV, ask about PrEP medicine to prevent HIV.  Get tested for HIV at least once in your life, whether you are at risk for HIV or not.  Cancer screening tests  Cervical cancer screening: If you have a cervix, begin getting regular cervical cancer screening tests starting at age 21.  Breast cancer scan (mammogram): If you've ever had breasts, begin having regular mammograms starting at age 40. This is a scan to check for breast cancer.  Colon cancer screening: It is important to start screening for colon cancer at age 45.  Have a colonoscopy test every 10 years (or more often if you're at risk) Or, ask your provider about stool tests like a FIT test every year or Cologuard test every 3 years.  To learn more about your testing options, visit:   .  For help making a decision, visit:   https://bit.ly/kg31918.  Prostate cancer screening test: If you have a prostate, ask your care team if a prostate cancer screening test (PSA) at age 55 is right for you.  Lung cancer screening: If you are a current or former smoker ages 50 to 80, ask your care team if ongoing lung cancer screenings are right for you.  For informational purposes only. Not to replace the advice of your health care provider. Copyright   2023 [employer]. All rights reserved. Clinically reviewed by the [facility] Transitions Program. [employer] 670434 - REV 01/24.

## 2024-12-12 LAB
ALBUMIN SERPL BCG-MCNC: 4.4 G/DL (ref 3.5–5.2)
ALP SERPL-CCNC: 62 U/L (ref 40–150)
ALT SERPL W P-5'-P-CCNC: 24 U/L (ref 0–50)
ANION GAP SERPL CALCULATED.3IONS-SCNC: 10 MMOL/L (ref 7–15)
AST SERPL W P-5'-P-CCNC: 22 U/L (ref 0–45)
BILIRUB SERPL-MCNC: 0.2 MG/DL
BUN SERPL-MCNC: 9.8 MG/DL (ref 6–20)
CALCIUM SERPL-MCNC: 10.3 MG/DL (ref 8.8–10.4)
CHLORIDE SERPL-SCNC: 102 MMOL/L (ref 98–107)
CHOLEST SERPL-MCNC: 189 MG/DL
CREAT SERPL-MCNC: 0.6 MG/DL (ref 0.51–0.95)
DHEA-S SERPL-MCNC: 116 UG/DL (ref 35–430)
EGFRCR SERPLBLD CKD-EPI 2021: >90 ML/MIN/1.73M2
FASTING STATUS PATIENT QL REPORTED: NO
FASTING STATUS PATIENT QL REPORTED: NO
FERRITIN SERPL-MCNC: 43 NG/ML (ref 6–175)
FSH SERPL IRP2-ACNC: 4.6 MIU/ML
GLUCOSE SERPL-MCNC: 83 MG/DL (ref 70–99)
HCO3 SERPL-SCNC: 25 MMOL/L (ref 22–29)
HDLC SERPL-MCNC: 65 MG/DL
IRON BINDING CAPACITY (ROCHE): 284 UG/DL (ref 240–430)
IRON SATN MFR SERPL: 20 % (ref 15–46)
IRON SERPL-MCNC: 58 UG/DL (ref 37–145)
LDLC SERPL CALC-MCNC: 112 MG/DL
LH SERPL-ACNC: 2.8 MIU/ML
NONHDLC SERPL-MCNC: 124 MG/DL
POTASSIUM SERPL-SCNC: 4.1 MMOL/L (ref 3.4–5.3)
PROT SERPL-MCNC: 7.8 G/DL (ref 6.4–8.3)
SHBG SERPL-SCNC: 65 NMOL/L (ref 30–135)
SODIUM SERPL-SCNC: 137 MMOL/L (ref 135–145)
TRIGL SERPL-MCNC: 60 MG/DL
TSH SERPL DL<=0.005 MIU/L-ACNC: 1.2 UIU/ML (ref 0.3–4.2)

## 2024-12-15 LAB
TESTOST FREE SERPL-MCNC: 0.16 NG/DL
TESTOST SERPL-MCNC: 14 NG/DL (ref 8–60)

## 2025-03-31 ENCOUNTER — OFFICE VISIT (OUTPATIENT)
Dept: FAMILY MEDICINE | Facility: CLINIC | Age: 35
End: 2025-03-31
Payer: COMMERCIAL

## 2025-03-31 VITALS
HEART RATE: 73 BPM | SYSTOLIC BLOOD PRESSURE: 110 MMHG | HEIGHT: 69 IN | RESPIRATION RATE: 16 BRPM | DIASTOLIC BLOOD PRESSURE: 78 MMHG | BODY MASS INDEX: 31.4 KG/M2 | WEIGHT: 212 LBS | OXYGEN SATURATION: 100 % | TEMPERATURE: 97.2 F

## 2025-03-31 DIAGNOSIS — N64.4 MASTODYNIA: ICD-10-CM

## 2025-03-31 DIAGNOSIS — R10.13 ABDOMINAL PAIN, EPIGASTRIC: Primary | ICD-10-CM

## 2025-03-31 DIAGNOSIS — R10.13 ABDOMINAL PAIN, EPIGASTRIC: ICD-10-CM

## 2025-03-31 LAB
ALBUMIN UR-MCNC: NEGATIVE MG/DL
APPEARANCE UR: CLEAR
BILIRUB UR QL STRIP: NEGATIVE
COLOR UR AUTO: YELLOW
ERYTHROCYTE [DISTWIDTH] IN BLOOD BY AUTOMATED COUNT: 11.8 % (ref 10–15)
GLUCOSE UR STRIP-MCNC: NEGATIVE MG/DL
HCT VFR BLD AUTO: 39.7 % (ref 35–47)
HGB BLD-MCNC: 12.6 G/DL (ref 11.7–15.7)
HGB UR QL STRIP: NEGATIVE
KETONES UR STRIP-MCNC: NEGATIVE MG/DL
LEUKOCYTE ESTERASE UR QL STRIP: NEGATIVE
MCH RBC QN AUTO: 30 PG (ref 26.5–33)
MCHC RBC AUTO-ENTMCNC: 31.7 G/DL (ref 31.5–36.5)
MCV RBC AUTO: 95 FL (ref 78–100)
NITRATE UR QL: NEGATIVE
PH UR STRIP: 7 [PH] (ref 5–7)
PLATELET # BLD AUTO: 289 10E3/UL (ref 150–450)
RBC # BLD AUTO: 4.2 10E6/UL (ref 3.8–5.2)
SP GR UR STRIP: 1.01 (ref 1–1.03)
UROBILINOGEN UR STRIP-ACNC: 0.2 E.U./DL
WBC # BLD AUTO: 5.2 10E3/UL (ref 4–11)

## 2025-03-31 PROCEDURE — 99000 SPECIMEN HANDLING OFFICE-LAB: CPT | Performed by: FAMILY MEDICINE

## 2025-03-31 PROCEDURE — 3074F SYST BP LT 130 MM HG: CPT | Performed by: FAMILY MEDICINE

## 2025-03-31 PROCEDURE — 81003 URINALYSIS AUTO W/O SCOPE: CPT | Performed by: FAMILY MEDICINE

## 2025-03-31 PROCEDURE — 1126F AMNT PAIN NOTED NONE PRSNT: CPT | Performed by: FAMILY MEDICINE

## 2025-03-31 PROCEDURE — 83690 ASSAY OF LIPASE: CPT | Performed by: FAMILY MEDICINE

## 2025-03-31 PROCEDURE — 85027 COMPLETE CBC AUTOMATED: CPT | Performed by: FAMILY MEDICINE

## 2025-03-31 PROCEDURE — 82150 ASSAY OF AMYLASE: CPT | Performed by: FAMILY MEDICINE

## 2025-03-31 PROCEDURE — 80076 HEPATIC FUNCTION PANEL: CPT | Performed by: FAMILY MEDICINE

## 2025-03-31 PROCEDURE — 83013 H PYLORI (C-13) BREATH: CPT | Mod: 90 | Performed by: FAMILY MEDICINE

## 2025-03-31 PROCEDURE — 36415 COLL VENOUS BLD VENIPUNCTURE: CPT | Performed by: FAMILY MEDICINE

## 2025-03-31 PROCEDURE — 99214 OFFICE O/P EST MOD 30 MIN: CPT | Performed by: FAMILY MEDICINE

## 2025-03-31 PROCEDURE — 3078F DIAST BP <80 MM HG: CPT | Performed by: FAMILY MEDICINE

## 2025-03-31 RX ORDER — OMEPRAZOLE 20 MG/1
20 CAPSULE, DELAYED RELEASE ORAL DAILY
Qty: 90 CAPSULE | Refills: 0 | Status: SHIPPED | OUTPATIENT
Start: 2025-03-31

## 2025-03-31 RX ORDER — OMEPRAZOLE 20 MG/1
20 CAPSULE, DELAYED RELEASE ORAL DAILY
Qty: 30 CAPSULE | Refills: 0 | Status: SHIPPED | OUTPATIENT
Start: 2025-03-31 | End: 2025-03-31

## 2025-03-31 ASSESSMENT — PAIN SCALES - GENERAL: PAINLEVEL_OUTOF10: NO PAIN (0)

## 2025-03-31 NOTE — PROGRESS NOTES
Assessment & Plan     Abdominal pain, epigastric  Advised avoid gastric Irritants  Was fasting due to Ramadan  Will check H Pylori as she has a history of This  Start PPI after H Pylori test  Avoid gastric Irritants as discussed   - Helicobacter pylori Breath Test; Future  - CBC with platelets; Future  - Lipase; Future  - Amylase; Future  - UA Macroscopic with reflex to Microscopic and Culture; Future  - Hepatic function panel; Future  - omeprazole (PRILOSEC) 20 MG DR capsule; Take 1 capsule (20 mg) by mouth daily.    Mastodynia  Advised to schedule  - MA Diagnostic Right w/ Chepe; Future    Follow up PCP 1 month-sooner if worse       Subjective   Jenae is a 34 year old, presenting for the following health issues:  Breast Mass (Pain off and on has had mammogram and ultrasound in 2023. Notice lump about 3-4 weeks ago) and Gastrophageal Reflux (Similar pain about 10 years ago diagnosed with  H pylori)        3/31/2025    12:39 PM   Additional Questions   Roomed by Sarah     History of Present Illness       Reason for visit:  Epigastric pain and breast lump  Symptom onset:  1-2 weeks ago  Symptoms include:  Epigastric pain  Symptom intensity:  Severe  Symptom progression:  Staying the same  Had these symptoms before:  Yes  Has tried/received treatment for these symptoms:  Yes  Previous treatment was successful:  No  What makes it worse:  Hunger  What makes it better:  No   She is taking medications regularly.          GERD/Heartburn  Onset/Duration: pain epigastric area-1 month but more Frequent now   Description: as above  Intensity: moderate  Progression of Symptoms: waxing and waning  Accompanying Signs & Symptoms:  Does it feel like food gets stuck or trouble swallowing: No  Nausea: YES- when she gets pain  Vomiting (bloody?): No  Abdominal Pain: YES- epigastric  Black-Tarry stools: No  Bloody stools: No  History:  Previous similar episodes: YES-   Previous ulcers: history of Bleeding ulcers  Precipitating  "factors:   Caffeine use: YES- 1 c /day  Alcohol use: No  NSAID/Aspirin use: No  Tobacco use: No  Worse with was fasting .  Alleviating factors: pepcid used to help but nontender now  Therapies tried and outcome:             Lifestyle changes: None            Medications: Pepcid (famotidine)      2 weeks go -tiny Lump Right Breast  Gets pain off and off   Used to have cyclical Breast pain  Lmp 5 days ago her period started   Has had mammogram and Ultrasound  a ew years ago  No Family History Breast ca    Rest of the ROS is Negative except see above and Problem list [stable]  Last mammogram reviewed       Objective    /78   Pulse 73   Temp 97.2  F (36.2  C) (Temporal)   Resp 16   Ht 1.75 m (5' 8.9\")   Wt 96.2 kg (212 lb)   SpO2 100%   BMI 31.40 kg/m    Body mass index is 31.4 kg/m .  Physical Exam   GENERAL: alert and no distress  NECK: no adenopathy, no asymmetry, masses, or scars  RESP: lungs clear to auscultation - no rales, rhonchi or wheezes  BREAST:   Right normal without masses, or nipple discharge and no palpable axillary masses or adenopathy, mild tenderness when papapating throughout Breast-she is just finishing her period  No mass  CV: regular rate and rhythm, normal S1 S2, no S3 or S4, no murmur, click or rub, no peripheral edema  ABDOMEN: soft, nontender, no hepatosplenomegaly, no masses and bowel sounds normal  MS: no gross musculoskeletal defects noted, no edema    Pending         Signed Electronically by: Bertha Pelayo MD    "

## 2025-04-01 ENCOUNTER — ANCILLARY ORDERS (OUTPATIENT)
Dept: FAMILY MEDICINE | Facility: CLINIC | Age: 35
End: 2025-04-01
Payer: COMMERCIAL

## 2025-04-01 DIAGNOSIS — N64.4 MASTODYNIA: Primary | ICD-10-CM

## 2025-04-01 LAB
ALBUMIN SERPL BCG-MCNC: 4.1 G/DL (ref 3.5–5.2)
ALP SERPL-CCNC: 60 U/L (ref 40–150)
ALT SERPL W P-5'-P-CCNC: 21 U/L (ref 0–50)
AMYLASE SERPL-CCNC: 51 U/L (ref 28–100)
AST SERPL W P-5'-P-CCNC: 27 U/L (ref 0–45)
BILIRUB DIRECT SERPL-MCNC: <0.08 MG/DL (ref 0–0.3)
BILIRUB SERPL-MCNC: 0.3 MG/DL
LIPASE SERPL-CCNC: 32 U/L (ref 13–60)
PROT SERPL-MCNC: 7.2 G/DL (ref 6.4–8.3)
UREA BREATH TEST QL: NEGATIVE

## 2025-04-14 ENCOUNTER — ANCILLARY PROCEDURE (OUTPATIENT)
Dept: MAMMOGRAPHY | Facility: CLINIC | Age: 35
End: 2025-04-14
Attending: FAMILY MEDICINE
Payer: COMMERCIAL

## 2025-04-14 DIAGNOSIS — N64.4 MASTODYNIA: ICD-10-CM

## 2025-04-14 PROCEDURE — 76642 ULTRASOUND BREAST LIMITED: CPT | Mod: RT

## 2025-04-14 PROCEDURE — 77062 BREAST TOMOSYNTHESIS BI: CPT

## 2025-06-10 ENCOUNTER — OFFICE VISIT (OUTPATIENT)
Dept: FAMILY MEDICINE | Facility: CLINIC | Age: 35
End: 2025-06-10
Payer: COMMERCIAL

## 2025-06-10 ENCOUNTER — ORDERS ONLY (AUTO-RELEASED) (OUTPATIENT)
Dept: FAMILY MEDICINE | Facility: CLINIC | Age: 35
End: 2025-06-10

## 2025-06-10 VITALS
RESPIRATION RATE: 22 BRPM | DIASTOLIC BLOOD PRESSURE: 62 MMHG | WEIGHT: 208.8 LBS | BODY MASS INDEX: 30.93 KG/M2 | HEIGHT: 69 IN | TEMPERATURE: 98 F | OXYGEN SATURATION: 99 % | HEART RATE: 79 BPM | SYSTOLIC BLOOD PRESSURE: 118 MMHG

## 2025-06-10 DIAGNOSIS — R00.2 HEART PALPITATIONS: Primary | ICD-10-CM

## 2025-06-10 DIAGNOSIS — R00.2 HEART PALPITATIONS: ICD-10-CM

## 2025-06-10 PROCEDURE — 3074F SYST BP LT 130 MM HG: CPT | Performed by: FAMILY MEDICINE

## 2025-06-10 PROCEDURE — 3078F DIAST BP <80 MM HG: CPT | Performed by: FAMILY MEDICINE

## 2025-06-10 PROCEDURE — 99214 OFFICE O/P EST MOD 30 MIN: CPT | Performed by: FAMILY MEDICINE

## 2025-06-10 PROCEDURE — 1126F AMNT PAIN NOTED NONE PRSNT: CPT | Performed by: FAMILY MEDICINE

## 2025-06-10 ASSESSMENT — ENCOUNTER SYMPTOMS: FATIGUE: 1

## 2025-06-10 ASSESSMENT — PAIN SCALES - GENERAL: PAINLEVEL_OUTOF10: NO PAIN (0)

## 2025-06-10 NOTE — PROGRESS NOTES
"  Assessment & Plan     Heart palpitations    - ZIO PATCH MAIL OUT; Future    Patient reports she has been having increased palpitations, she feel her heart is racing, could last for a few hours, sometimes she feels fatigued and tired with it.    Previous, workup was negative, she had normal TSH, CBC, CMP,.  In addition, she had a normal EKG.    Patient reports no weight loss, she does not drink a lot of coffee or  energy drinks.    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    Discussed with patient could be likely isolated PVCs, advised with supportive care, reduce stress, increase fluid intake.    She will be fitted for a Zio patch for 2 weeks.        BMI  Estimated body mass index is 30.92 kg/m  as calculated from the following:    Height as of this encounter: 1.75 m (5' 8.9\").    Weight as of this encounter: 94.7 kg (208 lb 12.8 oz).   Weight management plan: Discussed healthy diet and exercise guidelines      Follow-up       Radha Emanuel is a 34 year old, presenting for the following health issues:    Patient reports no change in her weight, no lower extremity edema.  She has no short of breath, no cough and no chest pain.        6/10/2025     2:18 PM   Additional Questions   Roomed by Terri HERNANDEZ     Fatigue  Associated symptoms include fatigue.   History of Present Illness       Reason for visit:  Fatigue& palpitations  Symptom onset:  3-4 weeks ago  Symptom intensity:  Moderate  Symptom progression:  Staying the same  Had these symptoms before:  Yes  Has tried/received treatment for these symptoms:  No   She is taking medications regularly.   No pain,   does get lightheaded. Did feel like passing out           Review of Systems  Constitutional, HEENT, cardiovascular, pulmonary, GI, , musculoskeletal, neuro, skin, endocrine and psych systems are negative, except as otherwise noted.      Objective    /62 (BP Location: Right arm, Patient Position: Sitting, Cuff Size: Adult Large)   Pulse " "79   Temp 98  F (36.7  C) (Oral)   Resp 22   Ht 1.75 m (5' 8.9\")   Wt 94.7 kg (208 lb 12.8 oz)   LMP 06/06/2025   SpO2 99%   BMI 30.92 kg/m    Body mass index is 30.92 kg/m .  Physical Exam   GENERAL: alert and no distress  RESP: lungs clear to auscultation - no rales, rhonchi or wheezes  CV: regular rate and rhythm, normal S1 S2, no S3 or S4, no murmur, click or rub, no peripheral edema  MS: no gross musculoskeletal defects noted, no edema  NEURO: Normal strength and tone, mentation intact and speech normal      No orders of the defined types were placed in this encounter.       Signed Electronically by: Jose Raul San MD    "

## 2025-06-30 ENCOUNTER — OFFICE VISIT (OUTPATIENT)
Dept: MIDWIFE SERVICES | Facility: CLINIC | Age: 35
End: 2025-06-30
Payer: COMMERCIAL

## 2025-06-30 VITALS
WEIGHT: 210 LBS | BODY MASS INDEX: 31.1 KG/M2 | DIASTOLIC BLOOD PRESSURE: 76 MMHG | HEART RATE: 84 BPM | SYSTOLIC BLOOD PRESSURE: 111 MMHG

## 2025-06-30 DIAGNOSIS — Z30.432 ENCOUNTER FOR IUD REMOVAL: Primary | ICD-10-CM

## 2025-06-30 DIAGNOSIS — Z86.39 HISTORY OF VITAMIN D DEFICIENCY: ICD-10-CM

## 2025-06-30 DIAGNOSIS — Z30.432 ENCOUNTER FOR REMOVAL OF INTRAUTERINE CONTRACEPTIVE DEVICE: ICD-10-CM

## 2025-06-30 LAB — VIT D+METAB SERPL-MCNC: 35 NG/ML (ref 20–50)

## 2025-06-30 PROCEDURE — 36415 COLL VENOUS BLD VENIPUNCTURE: CPT | Performed by: ADVANCED PRACTICE MIDWIFE

## 2025-06-30 PROCEDURE — 3074F SYST BP LT 130 MM HG: CPT | Performed by: ADVANCED PRACTICE MIDWIFE

## 2025-06-30 PROCEDURE — 82306 VITAMIN D 25 HYDROXY: CPT | Performed by: ADVANCED PRACTICE MIDWIFE

## 2025-06-30 PROCEDURE — 58301 REMOVE INTRAUTERINE DEVICE: CPT | Performed by: ADVANCED PRACTICE MIDWIFE

## 2025-06-30 PROCEDURE — 3078F DIAST BP <80 MM HG: CPT | Performed by: ADVANCED PRACTICE MIDWIFE

## 2025-06-30 NOTE — PROGRESS NOTES
IUD Removal:  SUBJECTIVE:  Was a consent obtained?  Yes    Jenae Renee is a 34 year old  who presents today for removal of Aicha IUD, placed in 2022. She has two kids at home, ages 4 and 6, and is planning another pregnancy. Would like to give her body a few months for her cycle to regulate and then will start trying to conceive.    Requests vitamin D level today as she has a history of vitamin D deficiency.    PROCEDURE:  A speculum exam was performed and the cervix was visualized. The IUD string was visualized. Using ring forceps, the string  was grasped and the IUD removed intact.    POST PROCEDURE:  The patient tolerated the procedure well. Patient was discharged in stable condition.    Call if bleeding, pain or fever occur.   Vitamin D ordered, follow up with result.    JANELLE Schmitz CNM  2025

## 2025-06-30 NOTE — ADDENDUM NOTE
Addended by: WEEKLY, LAURE JUAREZ on: 6/30/2025 03:12 PM     Modules accepted: Orders, Level of Service

## 2025-07-02 ENCOUNTER — RESULTS FOLLOW-UP (OUTPATIENT)
Dept: OBGYN | Facility: CLINIC | Age: 35
End: 2025-07-02

## 2025-07-14 ENCOUNTER — MYC MEDICAL ADVICE (OUTPATIENT)
Dept: FAMILY MEDICINE | Facility: CLINIC | Age: 35
End: 2025-07-14
Payer: COMMERCIAL

## 2025-07-14 NOTE — TELEPHONE ENCOUNTER
Received message from [employer] in regards to Ziopatch Device # [id number]    Definitions  Overdue Pending Return Monitors  These monitors are over 30 days past the day they were registered and we do not have the monitor to upload. We want to make you aware that if these monitors do not make it to us by the 45th day after they were registered, we will consider them lost and the system will mayra them as such. Please reach out to your patients to encourage them to return their Zio patch as soon as possible. If the monitor comes in at any time after it is marked lost, we will upload the data and provide you the report.    Left message on answering machine for patient to call back to see if patient ever wore the patch and if she returned it or if she is still planning to wear it . Also sent [employer] message too.    Teri Martinez MA

## 2025-07-21 NOTE — TELEPHONE ENCOUNTER
Called and spoke with patient - she forgot all about it but is no longer having symptoms and currently in between insurances right not- she does NOT plan to pursue the ziopatch at this time. Advised that patient mail the device back if she is not planning to wear it, so that way [employer] can track it and not consider it lost. Patient agreed to plan. Will also email [employer] to let them know    FYI to Dr. San.      Teri Martinez MA